# Patient Record
Sex: FEMALE | Race: WHITE | NOT HISPANIC OR LATINO | Employment: UNEMPLOYED | URBAN - NONMETROPOLITAN AREA
[De-identification: names, ages, dates, MRNs, and addresses within clinical notes are randomized per-mention and may not be internally consistent; named-entity substitution may affect disease eponyms.]

---

## 2017-01-06 ENCOUNTER — ANTICOAGULATION VISIT (OUTPATIENT)
Dept: CARDIOLOGY | Facility: CLINIC | Age: 37
End: 2017-01-06

## 2017-01-06 DIAGNOSIS — Z95.2 HX OF MITRAL VALVE REPLACEMENT WITH MECHANICAL VALVE: ICD-10-CM

## 2017-01-06 LAB — INR PPP: 3.4

## 2017-01-16 NOTE — PROGRESS NOTES
I have reviewed the notes, assessments, and/or procedures performed by nurse, I concur with her/his documentation of Frances Motta.

## 2017-01-20 ENCOUNTER — ANTICOAGULATION VISIT (OUTPATIENT)
Dept: CARDIOLOGY | Facility: CLINIC | Age: 37
End: 2017-01-20

## 2017-01-20 DIAGNOSIS — Z95.2 MECHANICAL HEART VALVE PRESENT: Primary | ICD-10-CM

## 2017-01-20 DIAGNOSIS — Z95.2 HX OF MITRAL VALVE REPLACEMENT WITH MECHANICAL VALVE: ICD-10-CM

## 2017-01-20 LAB — INR PPP: 2.6

## 2017-02-03 ENCOUNTER — ANTICOAGULATION VISIT (OUTPATIENT)
Dept: CARDIOLOGY | Facility: CLINIC | Age: 37
End: 2017-02-03

## 2017-02-03 ENCOUNTER — LAB (OUTPATIENT)
Dept: LAB | Facility: HOSPITAL | Age: 37
End: 2017-02-03

## 2017-02-03 DIAGNOSIS — Z95.2 HISTORY OF PROSTHETIC HEART VALVE: Primary | ICD-10-CM

## 2017-02-03 DIAGNOSIS — Z95.2 HX OF MITRAL VALVE REPLACEMENT WITH MECHANICAL VALVE: Primary | ICD-10-CM

## 2017-02-03 DIAGNOSIS — Z95.2 HX OF MITRAL VALVE REPLACEMENT WITH MECHANICAL VALVE: ICD-10-CM

## 2017-02-03 LAB
INR PPP: 2
INR PPP: 2 (ref 0.8–1.2)

## 2017-02-03 PROCEDURE — 85610 PROTHROMBIN TIME: CPT | Performed by: INTERNAL MEDICINE

## 2017-02-17 ENCOUNTER — RESULTS ENCOUNTER (OUTPATIENT)
Dept: CARDIOLOGY | Facility: CLINIC | Age: 37
End: 2017-02-17

## 2017-02-17 DIAGNOSIS — Z95.2 MECHANICAL HEART VALVE PRESENT: ICD-10-CM

## 2017-02-20 ENCOUNTER — APPOINTMENT (OUTPATIENT)
Dept: LAB | Facility: HOSPITAL | Age: 37
End: 2017-02-20

## 2017-02-20 ENCOUNTER — OFFICE VISIT (OUTPATIENT)
Dept: FAMILY MEDICINE CLINIC | Facility: CLINIC | Age: 37
End: 2017-02-20

## 2017-02-20 VITALS
WEIGHT: 217.7 LBS | DIASTOLIC BLOOD PRESSURE: 80 MMHG | SYSTOLIC BLOOD PRESSURE: 118 MMHG | OXYGEN SATURATION: 99 % | HEIGHT: 66 IN | BODY MASS INDEX: 34.99 KG/M2 | HEART RATE: 60 BPM | TEMPERATURE: 98.1 F

## 2017-02-20 DIAGNOSIS — E11.9 DIABETES MELLITUS WITHOUT COMPLICATION (HCC): Primary | ICD-10-CM

## 2017-02-20 DIAGNOSIS — E03.9 HYPOTHYROIDISM, UNSPECIFIED TYPE: ICD-10-CM

## 2017-02-20 LAB
ANION GAP SERPL CALCULATED.3IONS-SCNC: 10 MMOL/L (ref 5–15)
BUN BLD-MCNC: 20 MG/DL (ref 7–21)
BUN/CREAT SERPL: 24.4 (ref 7–25)
CALCIUM SPEC-SCNC: 9.3 MG/DL (ref 8.4–10.2)
CHLORIDE SERPL-SCNC: 101 MMOL/L (ref 95–110)
CO2 SERPL-SCNC: 28 MMOL/L (ref 22–31)
CREAT BLD-MCNC: 0.82 MG/DL (ref 0.5–1)
GFR SERPL CREATININE-BSD FRML MDRD: 78 ML/MIN/1.73 (ref 64–149)
GLUCOSE BLD-MCNC: 277 MG/DL (ref 60–100)
HBA1C MFR BLD: 8.25 % (ref 4–5.6)
INR PPP: 2.29 (ref 0.8–1.2)
POTASSIUM BLD-SCNC: 4.4 MMOL/L (ref 3.5–5.1)
PROTHROMBIN TIME: 24.6 SECONDS (ref 11.1–15.3)
SODIUM BLD-SCNC: 139 MMOL/L (ref 137–145)
TSH SERPL DL<=0.05 MIU/L-ACNC: 2.09 MIU/ML (ref 0.46–4.68)

## 2017-02-20 PROCEDURE — 36415 COLL VENOUS BLD VENIPUNCTURE: CPT

## 2017-02-20 PROCEDURE — 80048 BASIC METABOLIC PNL TOTAL CA: CPT | Performed by: FAMILY MEDICINE

## 2017-02-20 PROCEDURE — 84443 ASSAY THYROID STIM HORMONE: CPT | Performed by: FAMILY MEDICINE

## 2017-02-20 PROCEDURE — 99213 OFFICE O/P EST LOW 20 MIN: CPT | Performed by: FAMILY MEDICINE

## 2017-02-20 PROCEDURE — 82043 UR ALBUMIN QUANTITATIVE: CPT | Performed by: FAMILY MEDICINE

## 2017-02-20 PROCEDURE — 83036 HEMOGLOBIN GLYCOSYLATED A1C: CPT | Performed by: FAMILY MEDICINE

## 2017-02-20 PROCEDURE — 85610 PROTHROMBIN TIME: CPT | Performed by: INTERNAL MEDICINE

## 2017-02-20 RX ORDER — FUROSEMIDE 40 MG/1
40 TABLET ORAL DAILY
Qty: 30 TABLET | Refills: 5 | Status: SHIPPED | OUTPATIENT
Start: 2017-02-20 | End: 2017-05-19 | Stop reason: SDUPTHER

## 2017-02-20 NOTE — PROGRESS NOTES
Subjective   Chief Complaint   Patient presents with   • Follow-up   • Diabetes       Frances Motta is a 36 y.o. female who presents for Follow-up and Diabetes   Diabetes   She presents for her follow-up diabetic visit. She has type 2 diabetes mellitus. There are no hypoglycemic associated symptoms. Pertinent negatives for hypoglycemia include no headaches. Pertinent negatives for diabetes include no chest pain, no fatigue, no foot paresthesias, no foot ulcerations, no polydipsia, no polyphagia, no polyuria and no visual change. Current diabetic treatment includes oral agent (dual therapy). She is compliant with treatment all of the time. (FBS ) Eye exam is current.   no episodes of hypoglycemia. Did have an episode where she felt sweaty, but not sure what her sugar was. Does not happen often.    The following portions of the patient's history were reviewed and updated as appropriate:problem list, current medications, allergies, past family history, past medical history, past social history and past surgical history    Past Medical History   Diagnosis Date   • Congestive heart failure    • Diabetes mellitus    • Enlarged heart        Social History   Substance Use Topics   • Smoking status: Former Smoker   • Smokeless tobacco: Not on file   • Alcohol use No       Medications:    Current Outpatient Prescriptions:   •  atorvastatin (LIPITOR) 20 MG tablet, Take 20 mg by mouth Daily., Disp: , Rfl:   •  fenofibrate 160 MG tablet, Take 160 mg by mouth Daily., Disp: , Rfl:   •  furosemide (LASIX) 40 MG tablet, Take 1 tablet by mouth Daily., Disp: 30 tablet, Rfl: 6  •  glipiZIDE (GLUCOTROL) 5 MG tablet, Take 1 tablet by mouth 2 (Two) Times a Day Before Meals., Disp: 60 tablet, Rfl: 5  •  levothyroxine (SYNTHROID, LEVOTHROID) 50 MCG tablet, Take 1 tab po Tuesday, Thursday, Saturday, Sunday and 1/2 tab po Monday, Wednesday, Friday, Disp: 28 tablet, Rfl: 5  •  metFORMIN (GLUCOPHAGE) 1000 MG tablet, Take 1 tablet  "by mouth 2 (Two) Times a Day., Disp: 60 tablet, Rfl: 6  •  pantoprazole (PROTONIX) 40 MG EC tablet, Take 1 tablet by mouth Daily., Disp: 30 tablet, Rfl: 6  •  sotalol (BETAPACE) 80 MG tablet, TAKE ONE TABLET BY MOUTH TWICE A DAY, Disp: 60 tablet, Rfl: 5  •  warfarin (COUMADIN) 5 MG tablet, Take 1 tablet by mouth Dose Adjusted By Provider As Needed., Disp: 90 tablet, Rfl: 6    Allergies   Allergen Reactions   • Lortab [Hydrocodone-Acetaminophen]        Review of Systems   Constitutional: Negative for fatigue and fever.   Respiratory: Negative for cough and shortness of breath.    Cardiovascular: Negative for chest pain and leg swelling.   Endocrine: Negative for polydipsia, polyphagia and polyuria.   Neurological: Negative for headaches.       Objective   Visit Vitals   • /80   • Pulse 60   • Temp 98.1 °F (36.7 °C)   • Ht 66\" (167.6 cm)   • Wt 217 lb 11.2 oz (98.7 kg)   • SpO2 99%   • BMI 35.14 kg/m2       Physical Exam   Constitutional: She appears well-developed and well-nourished. No distress.   HENT:   Head: Normocephalic.   Eyes: Conjunctivae are normal.   Cardiovascular: Normal rate, regular rhythm and normal heart sounds.  Exam reveals no gallop and no friction rub.    No murmur heard.  Pulmonary/Chest: Effort normal and breath sounds normal.   Abdominal: She exhibits no distension.   Musculoskeletal: She exhibits no edema.    Frances had a diabetic foot exam performed today.   During the foot exam she had a monofilament test performed (normal sensation bilaterally).    Vascular Status -  Her exam exhibits right foot vasculature normal. Her exam exhibits left foot vasculature normal.   Skin Integrity  -  Her right foot skin is intact.     Frances 's left foot skin is intact. .  Neurological: She is alert.   Skin: Skin is warm and dry.   Psychiatric: She has a normal mood and affect. Her behavior is normal.   Nursing note and vitals reviewed.      Assessment/Plan   Frances Motta is a 36 y.o. female " seen today for the followin. Diabetes mellitus without complication  Add januvia    - Hemoglobin A1c  - Basic Metabolic Panel  - SITagliptin (JANUVIA) 100 MG tablet; Take 1 tablet by mouth Daily.  Dispense: 14 tablet; Refill: 0    2. Hypothyroidism, unspecified type    - TSH    Follow up: Return in about 3 months (around 2017) for Follow up Diabetes.          This document has been electronically signed by Casandra Pedersen DO on 2017 10:22 PM

## 2017-03-03 ENCOUNTER — APPOINTMENT (OUTPATIENT)
Dept: LAB | Facility: HOSPITAL | Age: 37
End: 2017-03-03

## 2017-03-03 ENCOUNTER — ANTICOAGULATION VISIT (OUTPATIENT)
Dept: CARDIOLOGY | Facility: CLINIC | Age: 37
End: 2017-03-03

## 2017-03-03 DIAGNOSIS — Z95.2 S/P MITRAL VALVE REPLACEMENT: Primary | ICD-10-CM

## 2017-03-03 DIAGNOSIS — Z95.2 HX OF MITRAL VALVE REPLACEMENT WITH MECHANICAL VALVE: ICD-10-CM

## 2017-03-03 LAB
INR PPP: 3
INR PPP: 3 (ref 0.8–1.2)

## 2017-03-03 PROCEDURE — 85610 PROTHROMBIN TIME: CPT | Performed by: INTERNAL MEDICINE

## 2017-03-07 ENCOUNTER — TELEPHONE (OUTPATIENT)
Dept: FAMILY MEDICINE CLINIC | Facility: CLINIC | Age: 37
End: 2017-03-07

## 2017-03-07 DIAGNOSIS — E11.9 DIABETES MELLITUS WITHOUT COMPLICATION (HCC): ICD-10-CM

## 2017-03-07 NOTE — TELEPHONE ENCOUNTER
----- Message from Corrie Olivas sent at 3/6/2017  1:47 PM CST -----  Regarding: Refill  Contact: [phone]  Pt needs a refill on januvia.  Dr. Pedersen gave her samples and now she wants a RX for it sent to [facility].

## 2017-03-07 NOTE — TELEPHONE ENCOUNTER
----- Message from Corrie Olivas sent at 3/6/2017  1:47 PM CST -----  Regarding: Refill  Contact: [phone]  Pt needs a refill on januvia.  Dr. Pedersen gave her samples and now she wants a RX for it sent to [facility].

## 2017-03-17 ENCOUNTER — RESULTS ENCOUNTER (OUTPATIENT)
Dept: CARDIOLOGY | Facility: CLINIC | Age: 37
End: 2017-03-17

## 2017-03-17 DIAGNOSIS — Z95.2 MECHANICAL HEART VALVE PRESENT: ICD-10-CM

## 2017-04-03 ENCOUNTER — ANTICOAGULATION VISIT (OUTPATIENT)
Dept: CARDIOLOGY | Facility: CLINIC | Age: 37
End: 2017-04-03

## 2017-04-03 DIAGNOSIS — Z95.2 HX OF MITRAL VALVE REPLACEMENT WITH MECHANICAL VALVE: ICD-10-CM

## 2017-04-03 LAB
INR PPP: 1.8
INR PPP: 1.8

## 2017-04-05 ENCOUNTER — CLINICAL SUPPORT (OUTPATIENT)
Dept: CARDIOLOGY | Facility: CLINIC | Age: 37
End: 2017-04-05

## 2017-04-05 DIAGNOSIS — I42.1 HYPERTROPHIC OBSTRUCTIVE CARDIOMYOPATHY (HOCM) (HCC): Primary | ICD-10-CM

## 2017-04-05 PROCEDURE — 93289 INTERROG DEVICE EVAL HEART: CPT | Performed by: INTERNAL MEDICINE

## 2017-04-06 NOTE — PROGRESS NOTES
ICD interrogation report on Frances Motta is a 36 yr lady with IHSS and ventricular arrythmias s/p ICD placement on 7/13/2015. Device is a CHOBOL[employer] Evera XT DR    Battery voltage was adequate and longevity 9.3 yrs.    Bradycardia parameters were programmed in AAI / DDD mode. Tachycardia parameters were programmed in two zones VT-1 at 167 bpm, and VF at 188 bpm.    Atrial sensing was 1.9 mV and threshold 0.5 V @ 0.4 ms and impedence 513 ohms.  Ventricular sensing was 5.6 mV and threshold was 0.625 V @ 0.4 ms and impedence 342 ohms.    No atrial episodes were noted and 21 monitored VT episodes were noted, the longest lasting 2 sec.    ICD functioning well and no changes in parameters made.

## 2017-04-14 ENCOUNTER — HOSPITAL ENCOUNTER (EMERGENCY)
Facility: HOSPITAL | Age: 37
Discharge: HOME | End: 2017-04-14
Attending: FAMILY MEDICINE | Admitting: FAMILY MEDICINE

## 2017-04-14 ENCOUNTER — ANTICOAGULATION VISIT (OUTPATIENT)
Dept: CARDIOLOGY | Facility: CLINIC | Age: 37
End: 2017-04-14

## 2017-04-14 ENCOUNTER — RESULTS ENCOUNTER (OUTPATIENT)
Dept: CARDIOLOGY | Facility: CLINIC | Age: 37
End: 2017-04-14

## 2017-04-14 ENCOUNTER — APPOINTMENT (OUTPATIENT)
Dept: GENERAL RADIOLOGY | Facility: HOSPITAL | Age: 37
End: 2017-04-14

## 2017-04-14 VITALS
BODY MASS INDEX: 34.87 KG/M2 | RESPIRATION RATE: 20 BRPM | TEMPERATURE: 97.9 F | DIASTOLIC BLOOD PRESSURE: 59 MMHG | SYSTOLIC BLOOD PRESSURE: 102 MMHG | HEART RATE: 59 BPM | WEIGHT: 217 LBS | OXYGEN SATURATION: 98 % | HEIGHT: 66 IN

## 2017-04-14 DIAGNOSIS — Z95.2 MECHANICAL HEART VALVE PRESENT: ICD-10-CM

## 2017-04-14 DIAGNOSIS — R07.89 CHEST WALL PAIN: Primary | ICD-10-CM

## 2017-04-14 DIAGNOSIS — Z95.2 HX OF MITRAL VALVE REPLACEMENT WITH MECHANICAL VALVE: ICD-10-CM

## 2017-04-14 LAB
ALBUMIN SERPL-MCNC: 4 G/DL (ref 3.4–4.8)
ALBUMIN/GLOB SERPL: 1.4 G/DL (ref 1.1–1.8)
ALP SERPL-CCNC: 49 U/L (ref 38–126)
ALT SERPL W P-5'-P-CCNC: 34 U/L (ref 9–52)
ANION GAP SERPL CALCULATED.3IONS-SCNC: 12 MMOL/L (ref 5–15)
AST SERPL-CCNC: 31 U/L (ref 14–36)
BASOPHILS # BLD AUTO: 0.02 10*3/MM3 (ref 0–0.2)
BASOPHILS NFR BLD AUTO: 0.3 % (ref 0–2)
BILIRUB SERPL-MCNC: 0.4 MG/DL (ref 0.2–1.3)
BUN BLD-MCNC: 15 MG/DL (ref 7–21)
BUN/CREAT SERPL: 18.1 (ref 7–25)
CALCIUM SPEC-SCNC: 8.9 MG/DL (ref 8.4–10.2)
CHLORIDE SERPL-SCNC: 103 MMOL/L (ref 95–110)
CK MB SERPL-CCNC: 2.84 NG/ML (ref 0–5)
CK SERPL-CCNC: 91 U/L (ref 30–135)
CO2 SERPL-SCNC: 26 MMOL/L (ref 22–31)
CREAT BLD-MCNC: 0.83 MG/DL (ref 0.5–1)
DEPRECATED RDW RBC AUTO: 40.9 FL (ref 36.4–46.3)
EOSINOPHIL # BLD AUTO: 0.02 10*3/MM3 (ref 0–0.7)
EOSINOPHIL NFR BLD AUTO: 0.3 % (ref 0–7)
ERYTHROCYTE [DISTWIDTH] IN BLOOD BY AUTOMATED COUNT: 13.5 % (ref 11.5–14.5)
GFR SERPL CREATININE-BSD FRML MDRD: 77 ML/MIN/1.73 (ref 60–149)
GLOBULIN UR ELPH-MCNC: 2.8 GM/DL (ref 2.3–3.5)
GLUCOSE BLD-MCNC: 173 MG/DL (ref 60–100)
HCT VFR BLD AUTO: 36 % (ref 35–45)
HGB BLD-MCNC: 11.9 G/DL (ref 12–15.5)
HOLD SPECIMEN: NORMAL
HOLD SPECIMEN: NORMAL
IMM GRANULOCYTES # BLD: 0.02 10*3/MM3 (ref 0–0.02)
IMM GRANULOCYTES NFR BLD: 0.3 % (ref 0–0.5)
INR PPP: 1.8
INR PPP: 1.88 (ref 0.8–1.2)
LIPASE SERPL-CCNC: 80 U/L (ref 23–300)
LYMPHOCYTES # BLD AUTO: 2.87 10*3/MM3 (ref 0.6–4.2)
LYMPHOCYTES NFR BLD AUTO: 42.1 % (ref 10–50)
MAGNESIUM SERPL-MCNC: 1.8 MG/DL (ref 1.6–2.3)
MCH RBC QN AUTO: 27 PG (ref 26.5–34)
MCHC RBC AUTO-ENTMCNC: 33.1 G/DL (ref 31.4–36)
MCV RBC AUTO: 81.8 FL (ref 80–98)
MONOCYTES # BLD AUTO: 0.55 10*3/MM3 (ref 0–0.9)
MONOCYTES NFR BLD AUTO: 8.1 % (ref 0–12)
NEUTROPHILS # BLD AUTO: 3.34 10*3/MM3 (ref 2–8.6)
NEUTROPHILS NFR BLD AUTO: 48.9 % (ref 37–80)
NT-PROBNP SERPL-MCNC: 756 PG/ML (ref 0–450)
PLATELET # BLD AUTO: 367 10*3/MM3 (ref 150–450)
PMV BLD AUTO: 8.7 FL (ref 8–12)
POTASSIUM BLD-SCNC: 3.8 MMOL/L (ref 3.5–5.1)
PROT SERPL-MCNC: 6.8 G/DL (ref 6.3–8.6)
PROTHROMBIN TIME: 21.7 SECONDS (ref 11.1–15.3)
RBC # BLD AUTO: 4.4 10*6/MM3 (ref 3.77–5.16)
SODIUM BLD-SCNC: 141 MMOL/L (ref 137–145)
TROPONIN I SERPL-MCNC: 0.02 NG/ML
TSH SERPL DL<=0.05 MIU/L-ACNC: 6.97 MIU/ML (ref 0.46–4.68)
WBC NRBC COR # BLD: 6.82 10*3/MM3 (ref 3.2–9.8)
WHOLE BLOOD HOLD SPECIMEN: NORMAL
WHOLE BLOOD HOLD SPECIMEN: NORMAL

## 2017-04-14 PROCEDURE — 96374 THER/PROPH/DIAG INJ IV PUSH: CPT

## 2017-04-14 PROCEDURE — 80053 COMPREHEN METABOLIC PANEL: CPT | Performed by: FAMILY MEDICINE

## 2017-04-14 PROCEDURE — 25010000002 MORPHINE PER 10 MG: Performed by: FAMILY MEDICINE

## 2017-04-14 PROCEDURE — 84484 ASSAY OF TROPONIN QUANT: CPT | Performed by: FAMILY MEDICINE

## 2017-04-14 PROCEDURE — 71010 HC CHEST PA OR AP: CPT

## 2017-04-14 PROCEDURE — 83735 ASSAY OF MAGNESIUM: CPT | Performed by: EMERGENCY MEDICINE

## 2017-04-14 PROCEDURE — 83880 ASSAY OF NATRIURETIC PEPTIDE: CPT | Performed by: FAMILY MEDICINE

## 2017-04-14 PROCEDURE — 85025 COMPLETE CBC W/AUTO DIFF WBC: CPT | Performed by: FAMILY MEDICINE

## 2017-04-14 PROCEDURE — 84443 ASSAY THYROID STIM HORMONE: CPT | Performed by: EMERGENCY MEDICINE

## 2017-04-14 PROCEDURE — 82553 CREATINE MB FRACTION: CPT | Performed by: EMERGENCY MEDICINE

## 2017-04-14 PROCEDURE — 82550 ASSAY OF CK (CPK): CPT | Performed by: EMERGENCY MEDICINE

## 2017-04-14 PROCEDURE — 99284 EMERGENCY DEPT VISIT MOD MDM: CPT

## 2017-04-14 PROCEDURE — 93005 ELECTROCARDIOGRAM TRACING: CPT | Performed by: EMERGENCY MEDICINE

## 2017-04-14 PROCEDURE — 85610 PROTHROMBIN TIME: CPT | Performed by: EMERGENCY MEDICINE

## 2017-04-14 PROCEDURE — 83690 ASSAY OF LIPASE: CPT | Performed by: EMERGENCY MEDICINE

## 2017-04-14 RX ORDER — OXYCODONE HYDROCHLORIDE AND ACETAMINOPHEN 5; 325 MG/1; MG/1
1 TABLET ORAL EVERY 6 HOURS PRN
Qty: 15 TABLET | Refills: 0 | Status: SHIPPED | OUTPATIENT
Start: 2017-04-14 | End: 2017-05-19

## 2017-04-14 RX ORDER — SODIUM CHLORIDE 0.9 % (FLUSH) 0.9 %
10 SYRINGE (ML) INJECTION AS NEEDED
Status: DISCONTINUED | OUTPATIENT
Start: 2017-04-14 | End: 2017-04-14 | Stop reason: HOSPADM

## 2017-04-14 RX ORDER — ASPIRIN 325 MG
325 TABLET ORAL ONCE
Status: COMPLETED | OUTPATIENT
Start: 2017-04-14 | End: 2017-04-14

## 2017-04-14 RX ORDER — NITROGLYCERIN 0.4 MG/1
0.4 TABLET SUBLINGUAL
Status: DISCONTINUED | OUTPATIENT
Start: 2017-04-14 | End: 2017-04-14 | Stop reason: HOSPADM

## 2017-04-14 RX ORDER — MORPHINE SULFATE 4 MG/ML
4 INJECTION, SOLUTION INTRAMUSCULAR; INTRAVENOUS ONCE
Status: COMPLETED | OUTPATIENT
Start: 2017-04-14 | End: 2017-04-14

## 2017-04-14 RX ADMIN — MORPHINE SULFATE 4 MG: 4 INJECTION, SOLUTION INTRAMUSCULAR; INTRAVENOUS at 09:30

## 2017-04-14 RX ADMIN — NITROGLYCERIN 0.4 MG: 0.4 TABLET SUBLINGUAL at 07:01

## 2017-04-14 RX ADMIN — ASPIRIN 325 MG: 325 TABLET, COATED ORAL at 06:50

## 2017-04-14 RX ADMIN — NITROGLYCERIN 1 INCH: 20 OINTMENT TOPICAL at 06:50

## 2017-04-14 RX ADMIN — NITROGLYCERIN 0.4 MG: 0.4 TABLET SUBLINGUAL at 07:09

## 2017-04-14 NOTE — ED PROVIDER NOTES
Subjective   HPI Comments: Mechanical valve surgery done in 2015. Pacer/defib placed in 2015.    Patient is a 36 y.o. female presenting with chest pain.   Chest Pain   Pain location:  L chest and substernal area  Pain quality: pressure    Pain radiates to:  L arm  Pain severity:  Moderate  Onset quality:  Sudden  Duration:  2 hours  Timing:  Constant  Progression:  Unchanged  Chronicity:  New  Relieved by:  Nothing  Worsened by:  Nothing  Ineffective treatments:  None tried  Associated symptoms: dizziness and shortness of breath    Associated symptoms: no abdominal pain, no anorexia, no anxiety, no cough, no diaphoresis, no dysphagia, no fatigue, no fever, no headache, no heartburn, no nausea, no near-syncope, no numbness, no orthopnea, no palpitations, no PND, no syncope, no vomiting and no weakness    Risk factors: diabetes mellitus, high cholesterol and obesity    Risk factors: no coronary artery disease, no hypertension and not male  Smoker: quit 4 years ago.        Review of Systems   Constitutional: Negative for appetite change, chills, diaphoresis, fatigue and fever.   HENT: Negative for congestion, ear discharge, ear pain, nosebleeds, rhinorrhea, sinus pressure, sore throat and trouble swallowing.    Eyes: Negative for discharge and redness.   Respiratory: Positive for shortness of breath. Negative for apnea, cough, chest tightness and wheezing.    Cardiovascular: Positive for chest pain. Negative for palpitations, orthopnea, syncope, PND and near-syncope.   Gastrointestinal: Negative for abdominal pain, anorexia, diarrhea, heartburn, nausea and vomiting.   Endocrine: Negative for polyuria.   Genitourinary: Negative for dysuria, frequency and urgency.   Musculoskeletal: Negative for myalgias and neck pain.   Skin: Negative for color change and rash.   Allergic/Immunologic: Negative for immunocompromised state.   Neurological: Positive for dizziness. Negative for seizures, syncope, weakness, light-headedness,  numbness and headaches.   Hematological: Negative for adenopathy. Does not bruise/bleed easily.   Psychiatric/Behavioral: Negative for behavioral problems and confusion.   All other systems reviewed and are negative.      Past Medical History:   Diagnosis Date   • Asthma    • Congestive heart failure    • Diabetes mellitus    • Disease of thyroid gland     hypothyroidism    • Enlarged heart    • Migraine        Allergies   Allergen Reactions   • Lortab [Hydrocodone-Acetaminophen]        Past Surgical History:   Procedure Laterality Date   • A-V CARDIAC PACEMAKER INSERTION     • APPENDECTOMY     • ATRIAL CARDIAC PACEMAKER INSERTION     • CARDIAC CATHETERIZATION     • CARDIAC SURGERY     • CARDIAC VALVE SURGERY     • CYSTOSCOPY  09/23/2015   • HYSTERECTOMY     • TONSILLECTOMY     • TRANSESOPHAGEAL ECHOCARDIOGRAM (CABRERA)         Family History   Problem Relation Age of Onset   • Diabetes Father    • Hypertension Father    • Diabetes Maternal Grandmother    • Kidney disease Maternal Grandmother    • Hypertension Maternal Grandmother    • Heart disease Maternal Grandmother    • Stroke Maternal Grandmother    • Thyroid disease Maternal Grandmother    • Cancer Maternal Grandfather    • Kidney disease Paternal Grandmother    • Diabetes Paternal Grandfather        Social History     Social History   • Marital status:      Spouse name: N/A   • Number of children: N/A   • Years of education: N/A     Social History Main Topics   • Smoking status: Former Smoker   • Smokeless tobacco: None   • Alcohol use No   • Drug use: No   • Sexual activity: Not Asked     Other Topics Concern   • None     Social History Narrative   • None           Objective   Physical Exam   Constitutional: She is oriented to person, place, and time. She appears well-developed and well-nourished.   HENT:   Head: Normocephalic and atraumatic.   Nose: Nose normal.   Mouth/Throat: Oropharynx is clear and moist.   Eyes: Conjunctivae and EOM are normal.  Pupils are equal, round, and reactive to light. Right eye exhibits no discharge. Left eye exhibits no discharge. No scleral icterus.   Neck: Normal range of motion. Neck supple. No tracheal deviation present.   Cardiovascular: Normal rate, regular rhythm and normal heart sounds.    No murmur heard.  Pulmonary/Chest: Effort normal and breath sounds normal. No stridor. No respiratory distress. She has no wheezes. She has no rales.   Abdominal: Soft. Bowel sounds are normal. She exhibits no distension and no mass. There is no tenderness. There is no rebound and no guarding.   Musculoskeletal: She exhibits no edema.   Neurological: She is alert and oriented to person, place, and time. Coordination normal.   Skin: Skin is warm and dry. No rash noted. No erythema.   Psychiatric: She has a normal mood and affect. Her behavior is normal. Thought content normal.   Nursing note and vitals reviewed.      ECG 12 Lead    Date/Time: 4/14/2017 9:39 AM  Performed by: ANDRY HOLT  Authorized by: SANTHOSH LAZAR   Interpreted by physician  Comparison: compared with previous ECG from 10/31/2016  Similar to previous ECG  Comparison to previous ECG:     Rhythm: sinus rhythm  Rate: normal  BPM: 61  QRS axis: normal  Conduction: 1st degree  ST Elevation: V1  ST Depression: I                 ED Course  ED Course   Comment By Time   Cardiology called to evaluate patient Santhosh Lazar MD 04/14 0301   Discussed with Dr. Handley he said he is in the hospital and he will come to see the patient Santhosh Lazar MD 04/14 0162   Findings discussed with Dr. Haro, who states patient does not need admission for chest pain.  Patient discharged home, as chest pain was her main complaint and reason for admission. Andry Holt MD 04/14 7591   INR values discussed with patient, and patient shown her most recent past values as well.  Advised follow-up with the Coumadin clinic today for further instruction and management of her  Coumadin. Polo Holt MD 04/14 0951   Request # : [id number] Polo Holt MD 04/14 0943            Labs Reviewed   COMPREHENSIVE METABOLIC PANEL - Abnormal; Notable for the following:        Result Value    Glucose 173 (*)     All other components within normal limits   BNP (IN-HOUSE) - Abnormal; Notable for the following:     proBNP 756.0 (*)     All other components within normal limits   CBC WITH AUTO DIFFERENTIAL - Abnormal; Notable for the following:     Hemoglobin 11.9 (*)     All other components within normal limits   PROTIME-INR - Abnormal; Notable for the following:     Protime 21.7 (*)     INR 1.88 (*)     All other components within normal limits    Narrative:     Therapeutic range for most indications is 2.0-3.0 INR,  or 2.5-3.5 for mechanical heart valves.   TSH - Abnormal; Notable for the following:     TSH 6.970 (*)     All other components within normal limits   TROPONIN (IN-HOUSE) - Normal   CK - Normal   CK MB - Normal   LIPASE - Normal   MAGNESIUM - Normal   RAINBOW DRAW    Narrative:     The following orders were created for panel order [place] Draw.  Procedure                               Abnormality         Status                     ---------                               -----------         ------                     Light Blue Top[[id number]]                                                               Green Top (Gel)[[id number]]                                   In process                 Lavender Top[[id number]]                                      In process                 Gold Top - SST[[id number]]                                    In process                   Please view results for these tests on the individual orders.   TROPONIN (IN-HOUSE)   PROTIME-INR   TROPONIN (IN-HOUSE)   TROPONIN (IN-HOUSE)   TROPONIN (IN-HOUSE)   CK   CK   CK MB   CK MB   BNP (IN-HOUSE)   RAINBOW DRAW    Narrative:     The following orders were created for panel order [place] Draw.  Procedure                                Abnormality         Status                     ---------                               -----------         ------                     Light Blue Top[[id number]]                                    In process                 Green Top (Gel)[[id number]]                                                              Lavender Top[[id number]]                                                                 Gold Top - SST[[id number]]                                                                 Please view results for these tests on the individual orders.   COMPREHENSIVE METABOLIC PANEL   CBC WITH AUTO DIFFERENTIAL   POCT PROTIME - INR   CBC AND DIFFERENTIAL    Narrative:     The following orders were created for panel order CBC & Differential.  Procedure                               Abnormality         Status                     ---------                               -----------         ------                     CBC Auto Differential[[id number]]         Abnormal            Final result                 Please view results for these tests on the individual orders.   LIGHT BLUE TOP   GREEN TOP   LAVENDER TOP   GOLD TOP - SST   CBC AND DIFFERENTIAL    Narrative:     The following orders were created for panel order CBC & Differential.  Procedure                               Abnormality         Status                     ---------                               -----------         ------                     CBC Auto Differential[[id number]]                                                          Please view results for these tests on the individual orders.   LIGHT BLUE TOP   GREEN TOP   LAVENDER TOP   GOLD TOP - SST       XR Chest 1 View   Final Result   CONCLUSION: No active disease.      Electronically signed by:  Con Cervantes  4/14/2017 7:00 AM   CDT Workstation: [id number]                  [facility]    Final diagnoses:   Chest wall pain            Polo Holt MD  04/14/17 0938       Polo CONN  MD Jonathon  04/14/17 0941

## 2017-04-18 ENCOUNTER — HOSPITAL ENCOUNTER (EMERGENCY)
Facility: HOSPITAL | Age: 37
Discharge: HOME | End: 2017-04-18
Attending: FAMILY MEDICINE | Admitting: FAMILY MEDICINE

## 2017-04-18 ENCOUNTER — APPOINTMENT (OUTPATIENT)
Dept: GENERAL RADIOLOGY | Facility: HOSPITAL | Age: 37
End: 2017-04-18

## 2017-04-18 VITALS
TEMPERATURE: 98.3 F | HEART RATE: 58 BPM | BODY MASS INDEX: 34.87 KG/M2 | OXYGEN SATURATION: 97 % | HEIGHT: 66 IN | SYSTOLIC BLOOD PRESSURE: 113 MMHG | WEIGHT: 217 LBS | DIASTOLIC BLOOD PRESSURE: 61 MMHG | RESPIRATION RATE: 20 BRPM

## 2017-04-18 DIAGNOSIS — R07.9 CHEST PAIN, UNSPECIFIED TYPE: Primary | ICD-10-CM

## 2017-04-18 LAB
ALBUMIN SERPL-MCNC: 4.1 G/DL (ref 3.4–4.8)
ALBUMIN/GLOB SERPL: 1.4 G/DL (ref 1.1–1.8)
ALP SERPL-CCNC: 50 U/L (ref 38–126)
ALT SERPL W P-5'-P-CCNC: 39 U/L (ref 9–52)
ANION GAP SERPL CALCULATED.3IONS-SCNC: 13 MMOL/L (ref 5–15)
APTT PPP: 41.7 SECONDS (ref 20–40.3)
AST SERPL-CCNC: 52 U/L (ref 14–36)
BASOPHILS # BLD AUTO: 0.02 10*3/MM3 (ref 0–0.2)
BASOPHILS NFR BLD AUTO: 0.2 % (ref 0–2)
BILIRUB SERPL-MCNC: 0.4 MG/DL (ref 0.2–1.3)
BUN BLD-MCNC: 15 MG/DL (ref 7–21)
BUN/CREAT SERPL: 19.7 (ref 7–25)
CALCIUM SPEC-SCNC: 9.4 MG/DL (ref 8.4–10.2)
CHLORIDE SERPL-SCNC: 101 MMOL/L (ref 95–110)
CO2 SERPL-SCNC: 25 MMOL/L (ref 22–31)
CREAT BLD-MCNC: 0.76 MG/DL (ref 0.5–1)
DEPRECATED RDW RBC AUTO: 39.5 FL (ref 36.4–46.3)
EOSINOPHIL # BLD AUTO: 0.02 10*3/MM3 (ref 0–0.7)
EOSINOPHIL NFR BLD AUTO: 0.2 % (ref 0–7)
ERYTHROCYTE [DISTWIDTH] IN BLOOD BY AUTOMATED COUNT: 13.4 % (ref 11.5–14.5)
GFR SERPL CREATININE-BSD FRML MDRD: 85 ML/MIN/1.73 (ref 60–149)
GLOBULIN UR ELPH-MCNC: 2.9 GM/DL (ref 2.3–3.5)
GLUCOSE BLD-MCNC: 124 MG/DL (ref 60–100)
HCT VFR BLD AUTO: 37.7 % (ref 35–45)
HGB BLD-MCNC: 12.7 G/DL (ref 12–15.5)
HOLD SPECIMEN: NORMAL
HOLD SPECIMEN: NORMAL
IMM GRANULOCYTES # BLD: 0.02 10*3/MM3 (ref 0–0.02)
IMM GRANULOCYTES NFR BLD: 0.2 % (ref 0–0.5)
INR PPP: 3.33 (ref 0.8–1.2)
LYMPHOCYTES # BLD AUTO: 1.96 10*3/MM3 (ref 0.6–4.2)
LYMPHOCYTES NFR BLD AUTO: 18.6 % (ref 10–50)
MCH RBC QN AUTO: 27.2 PG (ref 26.5–34)
MCHC RBC AUTO-ENTMCNC: 33.7 G/DL (ref 31.4–36)
MCV RBC AUTO: 80.7 FL (ref 80–98)
MONOCYTES # BLD AUTO: 0.64 10*3/MM3 (ref 0–0.9)
MONOCYTES NFR BLD AUTO: 6.1 % (ref 0–12)
NEUTROPHILS # BLD AUTO: 7.86 10*3/MM3 (ref 2–8.6)
NEUTROPHILS NFR BLD AUTO: 74.7 % (ref 37–80)
NT-PROBNP SERPL-MCNC: 889 PG/ML (ref 0–450)
PLATELET # BLD AUTO: 367 10*3/MM3 (ref 150–450)
PMV BLD AUTO: 8.7 FL (ref 8–12)
POTASSIUM BLD-SCNC: 4.1 MMOL/L (ref 3.5–5.1)
PROT SERPL-MCNC: 7 G/DL (ref 6.3–8.6)
PROTHROMBIN TIME: 34.3 SECONDS (ref 11.1–15.3)
RBC # BLD AUTO: 4.67 10*6/MM3 (ref 3.77–5.16)
SODIUM BLD-SCNC: 139 MMOL/L (ref 137–145)
TROPONIN I SERPL-MCNC: <0.012 NG/ML
WBC NRBC COR # BLD: 10.52 10*3/MM3 (ref 3.2–9.8)
WHOLE BLOOD HOLD SPECIMEN: NORMAL
WHOLE BLOOD HOLD SPECIMEN: NORMAL

## 2017-04-18 PROCEDURE — 36415 COLL VENOUS BLD VENIPUNCTURE: CPT

## 2017-04-18 PROCEDURE — 99284 EMERGENCY DEPT VISIT MOD MDM: CPT

## 2017-04-18 PROCEDURE — 83880 ASSAY OF NATRIURETIC PEPTIDE: CPT | Performed by: FAMILY MEDICINE

## 2017-04-18 PROCEDURE — 93010 ELECTROCARDIOGRAM REPORT: CPT | Performed by: INTERNAL MEDICINE

## 2017-04-18 PROCEDURE — 93005 ELECTROCARDIOGRAM TRACING: CPT | Performed by: FAMILY MEDICINE

## 2017-04-18 PROCEDURE — 85610 PROTHROMBIN TIME: CPT | Performed by: FAMILY MEDICINE

## 2017-04-18 PROCEDURE — 80053 COMPREHEN METABOLIC PANEL: CPT | Performed by: FAMILY MEDICINE

## 2017-04-18 PROCEDURE — 71020 HC CHEST PA AND LATERAL: CPT

## 2017-04-18 PROCEDURE — 85730 THROMBOPLASTIN TIME PARTIAL: CPT | Performed by: FAMILY MEDICINE

## 2017-04-18 PROCEDURE — 84484 ASSAY OF TROPONIN QUANT: CPT | Performed by: FAMILY MEDICINE

## 2017-04-18 PROCEDURE — 85025 COMPLETE CBC W/AUTO DIFF WBC: CPT | Performed by: FAMILY MEDICINE

## 2017-04-18 PROCEDURE — 93005 ELECTROCARDIOGRAM TRACING: CPT

## 2017-04-18 RX ORDER — NITROGLYCERIN 0.4 MG/1
0.4 TABLET SUBLINGUAL
Status: DISCONTINUED | OUTPATIENT
Start: 2017-04-18 | End: 2017-04-18 | Stop reason: HOSPADM

## 2017-04-18 RX ORDER — ASPIRIN 325 MG
325 TABLET ORAL ONCE
Status: COMPLETED | OUTPATIENT
Start: 2017-04-18 | End: 2017-04-18

## 2017-04-18 RX ORDER — SODIUM CHLORIDE 0.9 % (FLUSH) 0.9 %
10 SYRINGE (ML) INJECTION AS NEEDED
Status: DISCONTINUED | OUTPATIENT
Start: 2017-04-18 | End: 2017-04-18 | Stop reason: HOSPADM

## 2017-04-18 RX ADMIN — ASPIRIN 325 MG: 325 TABLET, COATED ORAL at 16:00

## 2017-04-18 RX ADMIN — NITROGLYCERIN 0.4 MG: 0.4 TABLET SUBLINGUAL at 17:11

## 2017-04-18 NOTE — ED PROVIDER NOTES
Subjective   Patient is a 36 y.o. female presenting with chest pain.   History provided by:  Patient  Chest Pain   Pain location:  Substernal area  Pain quality: crushing    Pain radiates to:  Mid back  Pain severity:  Severe  Onset quality:  Sudden  Duration:  3 hours  Timing:  Constant  Progression:  Unchanged  Chronicity:  Recurrent  Context: not at rest, not stress and not trauma    Relieved by:  Certain positions  Worsened by:  Deep breathing  Ineffective treatments:  None tried  Associated symptoms: abdominal pain (over umbilical hernia), back pain, claudication, diaphoresis (for about 1 hour), dizziness (earlier but resolving), fatigue, nausea (during episode, but resolved currently), near-syncope, orthopnea, palpitations, shortness of breath (earlier with episode) and weakness    Associated symptoms: no altered mental status, no anorexia, no anxiety, no cough, no dysphagia, no fever, no headache, no heartburn (but does have history), no lower extremity edema, no numbness, no syncope and no vomiting    Abdominal pain:     Pain location: chronic pain due to hernia per patient.  Fatigue:     Severity:  Severe    Timing:  Constant    Progression:  Unchanged  Nausea:     Severity:  Moderate    Onset quality:  Sudden    Duration:  1 hour    Timing:  Constant    Progression:  Resolved  Shortness of breath:     Severity:  Moderate    Onset quality:  Sudden    Duration:  1 hour    Timing:  Constant    Progression:  Resolved  Risk factors: diabetes mellitus, high cholesterol, immobilization and obesity    Risk factors: no aortic disease, no birth control, no coronary artery disease, no hypertension, not male, no Marfan's syndrome, not pregnant, no prior DVT/PE, no smoking (former smoker, quit 4 years ago) and no surgery    Patient was sitting at a meeting at 1pm in work and all the sudden she had a cold sweat, patient turned white and had a sudden crushing pain 8/10.  Patient did not check blood sugar at that  time.    Review of Systems   Constitutional: Positive for diaphoresis (for about 1 hour) and fatigue. Negative for fever.   HENT: Positive for sneezing. Negative for congestion, ear pain, hearing loss, nosebleeds, postnasal drip, rhinorrhea, sinus pressure, sore throat, tinnitus and trouble swallowing.    Eyes: Negative for pain, redness and itching.   Respiratory: Positive for chest tightness and shortness of breath (earlier with episode). Negative for cough and wheezing.    Cardiovascular: Positive for chest pain, palpitations, orthopnea, claudication and near-syncope. Negative for syncope.   Gastrointestinal: Positive for abdominal pain (over umbilical hernia) and nausea (during episode, but resolved currently). Negative for anorexia, blood in stool, constipation, diarrhea, heartburn (but does have history) and vomiting.   Endocrine: Negative.    Genitourinary: Negative for dysuria, hematuria and vaginal bleeding.   Musculoskeletal: Positive for back pain. Negative for gait problem and neck pain.   Skin: Negative for rash and wound.   Neurological: Positive for dizziness (earlier but resolving), seizures (remote history) and weakness. Negative for numbness and headaches.   Psychiatric/Behavioral: Negative for agitation, hallucinations and suicidal ideas. The patient is not nervous/anxious.        Past Medical History:   Diagnosis Date   • Asthma    • Congestive heart failure    • Diabetes mellitus    • Disease of thyroid gland     hypothyroidism    • Enlarged heart    • Migraine        Allergies   Allergen Reactions   • Lortab [Hydrocodone-Acetaminophen]        Past Surgical History:   Procedure Laterality Date   • A-V CARDIAC PACEMAKER INSERTION     • APPENDECTOMY     • ATRIAL CARDIAC PACEMAKER INSERTION     • CARDIAC CATHETERIZATION     • CARDIAC SURGERY     • CARDIAC VALVE SURGERY     • CYSTOSCOPY  09/23/2015   • HYSTERECTOMY     • TONSILLECTOMY     • TRANSESOPHAGEAL ECHOCARDIOGRAM (CABRERA)         Family History    Problem Relation Age of Onset   • Diabetes Father    • Hypertension Father    • Diabetes Maternal Grandmother    • Kidney disease Maternal Grandmother    • Hypertension Maternal Grandmother    • Heart disease Maternal Grandmother    • Stroke Maternal Grandmother    • Thyroid disease Maternal Grandmother    • Cancer Maternal Grandfather    • Kidney disease Paternal Grandmother    • Diabetes Paternal Grandfather        Social History     Social History   • Marital status:      Spouse name: N/A   • Number of children: N/A   • Years of education: N/A     Social History Main Topics   • Smoking status: Former Smoker   • Smokeless tobacco: None   • Alcohol use No   • Drug use: No   • Sexual activity: Not Asked     Other Topics Concern   • None     Social History Narrative           Objective   Physical Exam    Procedures         ED Course  ED Course   Comment By Time   Patient seen and evaluated then discussed with Dr. SILVIANO Lr.  Dr. Sellers contacted as he is the cardiologist on call.  He says as her work up is negative she can be safely discharged and encouraged to keep appointment with Dr. Gonsalez tomorrow. Phyllis Orozco MD 04/18 7453        Results for orders placed or performed during the hospital encounter of 04/18/17   Troponin   Result Value Ref Range    Troponin I <0.012 <=0.034 ng/mL   Comprehensive Metabolic Panel   Result Value Ref Range    Glucose 124 (H) 60 - 100 mg/dL    BUN 15 7 - 21 mg/dL    Creatinine 0.76 0.50 - 1.00 mg/dL    Sodium 139 137 - 145 mmol/L    Potassium 4.1 3.5 - 5.1 mmol/L    Chloride 101 95 - 110 mmol/L    CO2 25.0 22.0 - 31.0 mmol/L    Calcium 9.4 8.4 - 10.2 mg/dL    Total Protein 7.0 6.3 - 8.6 g/dL    Albumin 4.10 3.40 - 4.80 g/dL    ALT (SGPT) 39 9 - 52 U/L    AST (SGOT) 52 (H) 14 - 36 U/L    Alkaline Phosphatase 50 38 - 126 U/L    Total Bilirubin 0.4 0.2 - 1.3 mg/dL    eGFR Non African Amer 85 >60 mL/min/1.73    Globulin 2.9 2.3 - 3.5 gm/dL    A/G Ratio 1.4 1.1 - 1.8 g/dL     BUN/Creatinine Ratio 19.7 7.0 - 25.0    Anion Gap 13.0 5.0 - 15.0 mmol/L   BNP   Result Value Ref Range    proBNP 889.0 (H) 0.0 - 450.0 pg/mL   Protime-INR   Result Value Ref Range    Protime 34.3 (H) 11.1 - 15.3 Seconds    INR 3.33 (H) 0.80 - 1.20   aPTT   Result Value Ref Range    PTT 41.7 (H) 20.0 - 40.3 seconds   CBC Auto Differential   Result Value Ref Range    WBC 10.52 (H) 3.20 - 9.80 10*3/mm3    RBC 4.67 3.77 - 5.16 10*6/mm3    Hemoglobin 12.7 12.0 - 15.5 g/dL    Hematocrit 37.7 35.0 - 45.0 %    MCV 80.7 80.0 - 98.0 fL    MCH 27.2 26.5 - 34.0 pg    MCHC 33.7 31.4 - 36.0 g/dL    RDW 13.4 11.5 - 14.5 %    RDW-SD 39.5 36.4 - 46.3 fl    MPV 8.7 8.0 - 12.0 fL    Platelets 367 150 - 450 10*3/mm3    Neutrophil % 74.7 37.0 - 80.0 %    Lymphocyte % 18.6 10.0 - 50.0 %    Monocyte % 6.1 0.0 - 12.0 %    Eosinophil % 0.2 0.0 - 7.0 %    Basophil % 0.2 0.0 - 2.0 %    Immature Grans % 0.2 0.0 - 0.5 %    Neutrophils, Absolute 7.86 2.00 - 8.60 10*3/mm3    Lymphocytes, Absolute 1.96 0.60 - 4.20 10*3/mm3    Monocytes, Absolute 0.64 0.00 - 0.90 10*3/mm3    Eosinophils, Absolute 0.02 0.00 - 0.70 10*3/mm3    Basophils, Absolute 0.02 0.00 - 0.20 10*3/mm3    Immature Grans, Absolute 0.02 0.00 - 0.02 10*3/mm3   Light Blue Top   Result Value Ref Range    Extra Tube hold for add-on    Green Top (Gel)   Result Value Ref Range    Extra Tube Hold for add-ons.    Lavender Top   Result Value Ref Range    Extra Tube hold for add-on    Gold Top - SST   Result Value Ref Range    Extra Tube Hold for add-ons.                MDM    Final diagnoses:   Chest pain, unspecified type               This document has been electronically signed by Phyllis Orozco MD on April 18, 2017 5:41 PM       Phyllis Orozco MD  Resident  04/18/17 0434       Phyllis Orozco MD  Resident  04/18/17 5909

## 2017-04-18 NOTE — ED NOTES
Pt presents to ED with c/o chest pain onset this weekend. Pt was seen et treated in ED for similar complaint on Friday. Pt was dc'ed home et did not have any pain until this am.  Pt had returned to work et      Zandra Mcclain RN  04/18/17 [id number]

## 2017-04-19 ENCOUNTER — OFFICE VISIT (OUTPATIENT)
Dept: CARDIOLOGY | Facility: CLINIC | Age: 37
End: 2017-04-19

## 2017-04-19 VITALS
DIASTOLIC BLOOD PRESSURE: 80 MMHG | HEART RATE: 78 BPM | WEIGHT: 217 LBS | SYSTOLIC BLOOD PRESSURE: 120 MMHG | BODY MASS INDEX: 34.87 KG/M2 | HEIGHT: 66 IN

## 2017-04-19 DIAGNOSIS — Z95.2 HX OF MITRAL VALVE REPLACEMENT WITH MECHANICAL VALVE: ICD-10-CM

## 2017-04-19 DIAGNOSIS — I42.1 HYPERTROPHIC OBSTRUCTIVE CARDIOMYOPATHY (HOCM) (HCC): Primary | ICD-10-CM

## 2017-04-19 DIAGNOSIS — E11.9 TYPE 2 DIABETES MELLITUS WITHOUT COMPLICATION, WITHOUT LONG-TERM CURRENT USE OF INSULIN (HCC): ICD-10-CM

## 2017-04-19 PROCEDURE — 99213 OFFICE O/P EST LOW 20 MIN: CPT | Performed by: INTERNAL MEDICINE

## 2017-04-19 NOTE — PROGRESS NOTES
Frances Gutierrez [facility]  36 y.o. female    12/07/2016  1. Hypertrophic obstructive cardiomyopathy (HOCM)    2. Type 2 diabetes mellitus without complication, without long-term current use of insulin    3. Hx of mitral valve replacement with mechanical valve [Z95.2]        Chief complaint - follow-up hypertrophic cardiomyopathy      History of present Illness- 36-year-old lady who has hypertrophic obstructive cardiomyopathy, who had myomectomy and ICD placement and she is doing well and she has history of mitral valve replacement with a mechanical prosthesis, on chronic Coumadin therapy INR is to be kept between 2.5-3.5.  She denies any problems.  She had her hysterectomy due to dysfunctional uterine bleeding and she is doing well and has healed up well.  She denies any GI symptoms of neurological problems.  She denies any weight gain or weight loss.  She has been to the ER multiple times with chest pain.  She does not have any coronary disease by cardiac catheterization prior to her mitral valve replacement.  I'm going to enroll her in the adult congenital heart disease clinic at [facility].      Allergies   Allergen Reactions   • Lortab [Hydrocodone-Acetaminophen]          Past Medical History:   Diagnosis Date   • Asthma    • Congestive heart failure    • Diabetes mellitus    • Disease of thyroid gland     hypothyroidism    • Enlarged heart    • Migraine          Past Surgical History:   Procedure Laterality Date   • A-V CARDIAC PACEMAKER INSERTION     • APPENDECTOMY     • ATRIAL CARDIAC PACEMAKER INSERTION     • CARDIAC CATHETERIZATION     • CARDIAC SURGERY     • CARDIAC VALVE SURGERY     • CYSTOSCOPY  09/23/2015   • HYSTERECTOMY     • TONSILLECTOMY     • TRANSESOPHAGEAL ECHOCARDIOGRAM (CABRERA)           Family History   Problem Relation Age of Onset   • Diabetes Father    • Hypertension Father    • Diabetes Maternal Grandmother    • Kidney disease Maternal Grandmother    • Hypertension Maternal Grandmother    •  Heart disease Maternal Grandmother    • Stroke Maternal Grandmother    • Thyroid disease Maternal Grandmother    • Cancer Maternal Grandfather    • Kidney disease Paternal Grandmother    • Diabetes Paternal Grandfather          Social History     Social History   • Marital status:      Spouse name: N/A   • Number of children: N/A   • Years of education: N/A     Occupational History   • Not on file.     Social History Main Topics   • Smoking status: Former Smoker   • Smokeless tobacco: Never Used   • Alcohol use No   • Drug use: No   • Sexual activity: Defer     Other Topics Concern   • Not on file     Social History Narrative         Current Outpatient Prescriptions   Medication Sig Dispense Refill   • atorvastatin (LIPITOR) 20 MG tablet Take 20 mg by mouth Daily.     • fenofibrate 160 MG tablet Take 160 mg by mouth Daily.     • furosemide (LASIX) 40 MG tablet Take 1 tablet by mouth Daily. 30 tablet 5   • glipiZIDE (GLUCOTROL) 5 MG tablet Take 1 tablet by mouth 2 (Two) Times a Day Before Meals. 60 tablet 5   • levothyroxine (SYNTHROID, LEVOTHROID) 50 MCG tablet Take 1 tab po Tuesday, Thursday, Saturday, Sunday and 1/2 tab po Monday, Wednesday, Friday 28 tablet 5   • metFORMIN (GLUCOPHAGE) 1000 MG tablet Take 1 tablet by mouth 2 (Two) Times a Day. 60 tablet 6   • oxyCODONE-acetaminophen (PERCOCET) 5-325 MG per tablet Take 1 tablet by mouth Every 6 (Six) Hours As Needed for Moderate Pain (4-6). 15 tablet 0   • pantoprazole (PROTONIX) 40 MG EC tablet Take 1 tablet by mouth Daily. 30 tablet 6   • SITagliptin (JANUVIA) 100 MG tablet Take 1 tablet by mouth Daily. 30 tablet 5   • sotalol (BETAPACE) 80 MG tablet TAKE ONE TABLET BY MOUTH TWICE A DAY 60 tablet 5   • warfarin (COUMADIN) 5 MG tablet Take 1 tablet by mouth Dose Adjusted By Provider As Needed. 90 tablet 6     No current facility-administered medications for this visit.          Review of Systems     Constitution: Denies any fatigue, fever or  "chills    HENT: Denies any headache, hearing impairment, nasal discharge or sore throat    Eyes: Denies any blurring of vision, or photophobia     Cardivascular - As per history of present illness     Respiratory system-denies any COPD, shortness of breath, sleep apnea.     Endocrine:   history of hyperlipidemia, diabetes       Musculoskeletal:  No history of arthritis, musculoskeletal problems    Gastrointestinal: No nausea, vomiting, or melena    Genitourinary: No dysuria or hematuria    Neurological:   No history of seizure disorder, stroke, memory problems    Psychiatric/Behavioral:         history of depression    Hematological- no history of easy bruising or any bleeding diathesis            OBJECTIVE    /80  Pulse 78  Ht 66\" (167.6 cm)  Wt 217 lb (98.4 kg)  BMI 35.02 kg/m2      Physical Exam     Constitutional: is oriented to person, place, and time.     Skin-warm and dry    Well developed and nourished in no acute distress      Head: Normocephalic and atraumatic.     Eyes: Pupils are equal, round, and reactive to light.     Neck: Neck supple. No bruit in the carotids, no elevation of JVD    Cardiovascular: [place] in the fifth intercostal space, regular rate, and  Rhythm,  S1 greater than S2, no S3 or S4, prosthetic valve sound heard well    Pulmonary/Chest:   Air  Entry is equal on both sides  No wheezing or crackles,      Abdominal: Soft.  No hepatosplenomegaly, bowel sounds are present    Musculoskeletal: No kyphoscoliosis, no significant thickening of the joints    Neurological: is alert and oriented to person, place, and time.    cranial nerve are intact .   No motor or sensory deficit    Extremities-no edema,  pulses are felt equally on both sides, no radial femoral delay      Psychiatric: He has a normal mood and affect.                  His behavior is normal.           Procedures      Lab Results   Component Value Date    WBC 10.52 (H) 04/18/2017    HGB 12.7 04/18/2017    HCT 37.7 04/18/2017 "    MCV 80.7 04/18/2017     04/18/2017     Lab Results   Component Value Date    GLUCOSE 124 (H) 04/18/2017    BUN 15 04/18/2017    CREATININE 0.76 04/18/2017    EGFRIFNONA 85 04/18/2017    BCR 19.7 04/18/2017    CO2 25.0 04/18/2017    CALCIUM 9.4 04/18/2017    ALBUMIN 4.10 04/18/2017    LABIL2 1.4 04/18/2017    AST 52 (H) 04/18/2017    ALT 39 04/18/2017     No results found for: CHOL  Lab Results   Component Value Date    TRIG 292 (H) 11/17/2016    TRIG 264 (H) 08/12/2016    TRIG 1053 (H) 05/03/2016     Lab Results   Component Value Date    HDL 40 (L) 11/17/2016    HDL 37 (L) 12/01/2015    HDL 34 (L) 07/03/2015     Lab Results   Component Value Date    LDLCALC 108 11/17/2016    LDLCALC 102 08/12/2016    LDLCALC 111 12/01/2015     Lab Results   Component Value Date    LDL 81 07/03/2015    LDL Unable to calculate due to elevated Triglycerides. 02/09/2015     No results found for: HDLLDLRATIO  No components found for: CHOLHDL  Lab Results   Component Value Date    HGBA1C 8.25 (H) 02/20/2017     Lab Results   Component Value Date    TSH 6.970 (H) 04/14/2017                  A/P    Recurrent chest pain-no CAD by cardiac catheterization has HOCM.  Reassured her, will enroll her in the adult congenital heart disease clinic at [facility].    Hypertrophic obstructive cardio myopathy-status post myomectomy and ICD placement doing well on sotalol for ventricular arrhythmias doing well.    Diabetes on glipizide and metformin and she had multiple follicle or abscesses and is being drained.    Mixed hyperlipidemia on fenofibrate and atorvastatin and her lipids are good and on Synthroid supplements for hypothyroidism.    GERD on Protonix and doing okay.    Status post mitral valve replacement with [place] mechanical prosthesis-on Coumadin and INR is kept in 2.5-3.    Follow-up in 6 months      Wallace Haro MD  4/19/2017  4:43 PM

## 2017-04-20 ENCOUNTER — DOCUMENTATION (OUTPATIENT)
Dept: CARDIOLOGY | Facility: CLINIC | Age: 37
End: 2017-04-20

## 2017-04-20 NOTE — PROGRESS NOTES
Apt made for pt at [facility] per Dr Haro

## 2017-04-21 ENCOUNTER — ANTICOAGULATION VISIT (OUTPATIENT)
Dept: CARDIOLOGY | Facility: CLINIC | Age: 37
End: 2017-04-21

## 2017-04-21 ENCOUNTER — APPOINTMENT (OUTPATIENT)
Dept: LAB | Facility: HOSPITAL | Age: 37
End: 2017-04-21

## 2017-04-21 DIAGNOSIS — Z95.2 HEART VALVE REPLACED: Primary | ICD-10-CM

## 2017-04-21 DIAGNOSIS — Z95.2 HX OF MITRAL VALVE REPLACEMENT WITH MECHANICAL VALVE: ICD-10-CM

## 2017-04-21 LAB
INR PPP: 3.3
INR PPP: 3.3 (ref 0.8–1.2)

## 2017-04-21 PROCEDURE — 85610 PROTHROMBIN TIME: CPT | Performed by: INTERNAL MEDICINE

## 2017-04-21 RX ORDER — WARFARIN SODIUM 5 MG/1
5 TABLET ORAL
Qty: 180 TABLET | Refills: 6 | Status: SHIPPED | OUTPATIENT
Start: 2017-04-21 | End: 2017-05-19 | Stop reason: SDUPTHER

## 2017-05-12 ENCOUNTER — RESULTS ENCOUNTER (OUTPATIENT)
Dept: CARDIOLOGY | Facility: CLINIC | Age: 37
End: 2017-05-12

## 2017-05-12 DIAGNOSIS — Z95.2 MECHANICAL HEART VALVE PRESENT: ICD-10-CM

## 2017-05-19 ENCOUNTER — OFFICE VISIT (OUTPATIENT)
Dept: FAMILY MEDICINE CLINIC | Facility: CLINIC | Age: 37
End: 2017-05-19

## 2017-05-19 VITALS
DIASTOLIC BLOOD PRESSURE: 80 MMHG | WEIGHT: 220.1 LBS | SYSTOLIC BLOOD PRESSURE: 122 MMHG | HEIGHT: 66 IN | OXYGEN SATURATION: 99 % | BODY MASS INDEX: 35.37 KG/M2 | HEART RATE: 61 BPM

## 2017-05-19 DIAGNOSIS — E78.5 HYPERLIPIDEMIA, UNSPECIFIED HYPERLIPIDEMIA TYPE: ICD-10-CM

## 2017-05-19 DIAGNOSIS — E11.9 TYPE 2 DIABETES MELLITUS WITHOUT COMPLICATION, WITHOUT LONG-TERM CURRENT USE OF INSULIN (HCC): ICD-10-CM

## 2017-05-19 DIAGNOSIS — E03.9 HYPOTHYROIDISM, UNSPECIFIED TYPE: Primary | ICD-10-CM

## 2017-05-19 PROCEDURE — 99214 OFFICE O/P EST MOD 30 MIN: CPT | Performed by: FAMILY MEDICINE

## 2017-05-19 RX ORDER — ATORVASTATIN CALCIUM 20 MG/1
20 TABLET, FILM COATED ORAL DAILY
Qty: 90 TABLET | Refills: 3 | Status: SHIPPED | OUTPATIENT
Start: 2017-05-19 | End: 2018-03-19 | Stop reason: DRUGHIGH

## 2017-05-19 RX ORDER — PANTOPRAZOLE SODIUM 40 MG/1
40 TABLET, DELAYED RELEASE ORAL DAILY
Qty: 90 TABLET | Refills: 3 | Status: SHIPPED | OUTPATIENT
Start: 2017-05-19

## 2017-05-19 RX ORDER — FUROSEMIDE 40 MG/1
40 TABLET ORAL DAILY
Qty: 90 TABLET | Refills: 3 | Status: SHIPPED | OUTPATIENT
Start: 2017-05-19 | End: 2018-09-20

## 2017-05-19 RX ORDER — WARFARIN SODIUM 5 MG/1
5 TABLET ORAL
Qty: 180 TABLET | Refills: 6 | Status: SHIPPED | OUTPATIENT
Start: 2017-05-19 | End: 2019-06-19

## 2017-05-19 RX ORDER — LEVOTHYROXINE SODIUM 0.05 MG/1
TABLET ORAL
Qty: 90 TABLET | Refills: 3 | Status: SHIPPED | OUTPATIENT
Start: 2017-05-19 | End: 2017-06-01 | Stop reason: SDUPTHER

## 2017-05-19 RX ORDER — SOTALOL HYDROCHLORIDE 80 MG/1
80 TABLET ORAL 2 TIMES DAILY
Qty: 180 TABLET | Refills: 3 | Status: SHIPPED | OUTPATIENT
Start: 2017-05-19 | End: 2017-08-11

## 2017-05-19 RX ORDER — GLIPIZIDE 5 MG/1
5 TABLET ORAL
Qty: 60 TABLET | Refills: 5 | Status: CANCELLED | OUTPATIENT
Start: 2017-05-19

## 2017-05-19 RX ORDER — FENOFIBRATE 160 MG/1
160 TABLET ORAL DAILY
Qty: 90 TABLET | Refills: 3 | Status: SHIPPED | OUTPATIENT
Start: 2017-05-19 | End: 2019-06-19

## 2017-05-23 ENCOUNTER — APPOINTMENT (OUTPATIENT)
Dept: LAB | Facility: HOSPITAL | Age: 37
End: 2017-05-23

## 2017-05-23 ENCOUNTER — ANTICOAGULATION VISIT (OUTPATIENT)
Dept: CARDIOLOGY | Facility: CLINIC | Age: 37
End: 2017-05-23

## 2017-05-23 DIAGNOSIS — Z95.2 HX OF MITRAL VALVE REPLACEMENT WITH MECHANICAL VALVE: ICD-10-CM

## 2017-05-23 DIAGNOSIS — Z95.2 HEART VALVE REPLACED: Primary | ICD-10-CM

## 2017-05-23 LAB — INR PPP: 3.5

## 2017-05-23 PROCEDURE — 36415 COLL VENOUS BLD VENIPUNCTURE: CPT

## 2017-05-24 ENCOUNTER — LAB (OUTPATIENT)
Dept: LAB | Facility: HOSPITAL | Age: 37
End: 2017-05-24

## 2017-05-24 DIAGNOSIS — E03.9 HYPOTHYROIDISM, UNSPECIFIED TYPE: ICD-10-CM

## 2017-05-24 DIAGNOSIS — E11.9 TYPE 2 DIABETES MELLITUS WITHOUT COMPLICATION, WITHOUT LONG-TERM CURRENT USE OF INSULIN (HCC): ICD-10-CM

## 2017-05-24 DIAGNOSIS — Z95.2 MECHANICAL HEART VALVE PRESENT: ICD-10-CM

## 2017-05-24 LAB
ARTICHOKE IGE QN: 119 MG/DL (ref 1–129)
CHOLEST SERPL-MCNC: 174 MG/DL (ref 0–199)
HBA1C MFR BLD: 7.65 % (ref 4–5.6)
HDLC SERPL-MCNC: 31 MG/DL (ref 60–200)
LDLC/HDLC SERPL: 3.52 {RATIO} (ref 0–3.22)
T4 FREE SERPL-MCNC: 1.18 NG/DL (ref 0.78–2.19)
TRIGL SERPL-MCNC: 170 MG/DL (ref 20–199)
TSH SERPL DL<=0.05 MIU/L-ACNC: 7.86 MIU/ML (ref 0.46–4.68)

## 2017-05-24 PROCEDURE — 84439 ASSAY OF FREE THYROXINE: CPT | Performed by: FAMILY MEDICINE

## 2017-05-24 PROCEDURE — 84443 ASSAY THYROID STIM HORMONE: CPT | Performed by: FAMILY MEDICINE

## 2017-05-24 PROCEDURE — 83036 HEMOGLOBIN GLYCOSYLATED A1C: CPT | Performed by: FAMILY MEDICINE

## 2017-05-24 PROCEDURE — 80061 LIPID PANEL: CPT | Performed by: FAMILY MEDICINE

## 2017-05-31 DIAGNOSIS — E03.9 HYPOTHYROIDISM, UNSPECIFIED TYPE: ICD-10-CM

## 2017-06-01 DIAGNOSIS — E03.9 HYPOTHYROIDISM, UNSPECIFIED TYPE: ICD-10-CM

## 2017-06-01 RX ORDER — LEVOTHYROXINE SODIUM 0.05 MG/1
50 TABLET ORAL DAILY
Qty: 90 TABLET | Refills: 3 | Status: SHIPPED | OUTPATIENT
Start: 2017-06-01

## 2017-06-02 ENCOUNTER — TELEPHONE (OUTPATIENT)
Dept: FAMILY MEDICINE CLINIC | Facility: CLINIC | Age: 37
End: 2017-06-02

## 2017-06-02 NOTE — TELEPHONE ENCOUNTER
Pr , Ms. Motta has been called with her recent lab results & recommendations.  Continue her current medications and follow-up as planned or sooner if any problems.

## 2017-06-06 ENCOUNTER — APPOINTMENT (OUTPATIENT)
Dept: LAB | Facility: HOSPITAL | Age: 37
End: 2017-06-06

## 2017-06-06 ENCOUNTER — ANTICOAGULATION VISIT (OUTPATIENT)
Dept: CARDIOLOGY | Facility: CLINIC | Age: 37
End: 2017-06-06

## 2017-06-06 DIAGNOSIS — Z95.2 HX OF MITRAL VALVE REPLACEMENT WITH MECHANICAL VALVE: ICD-10-CM

## 2017-06-06 DIAGNOSIS — Z95.2 HEART VALVE REPLACED: Primary | ICD-10-CM

## 2017-06-06 LAB
INR PPP: 2.3
INR PPP: 2.3 (ref 0.8–1.2)

## 2017-06-06 PROCEDURE — 85610 PROTHROMBIN TIME: CPT | Performed by: INTERNAL MEDICINE

## 2017-06-30 ENCOUNTER — OFFICE VISIT (OUTPATIENT)
Dept: FAMILY MEDICINE CLINIC | Facility: CLINIC | Age: 37
End: 2017-06-30

## 2017-06-30 VITALS
OXYGEN SATURATION: 100 % | DIASTOLIC BLOOD PRESSURE: 78 MMHG | HEIGHT: 66 IN | BODY MASS INDEX: 34.11 KG/M2 | WEIGHT: 212.25 LBS | HEART RATE: 67 BPM | SYSTOLIC BLOOD PRESSURE: 138 MMHG

## 2017-06-30 DIAGNOSIS — E11.9 TYPE 2 DIABETES MELLITUS WITHOUT COMPLICATION, WITHOUT LONG-TERM CURRENT USE OF INSULIN (HCC): Primary | ICD-10-CM

## 2017-06-30 DIAGNOSIS — E03.9 HYPOTHYROIDISM, UNSPECIFIED TYPE: ICD-10-CM

## 2017-06-30 PROCEDURE — 99213 OFFICE O/P EST LOW 20 MIN: CPT | Performed by: FAMILY MEDICINE

## 2017-07-07 ENCOUNTER — ANTICOAGULATION VISIT (OUTPATIENT)
Dept: CARDIOLOGY | Facility: CLINIC | Age: 37
End: 2017-07-07

## 2017-07-07 ENCOUNTER — APPOINTMENT (OUTPATIENT)
Dept: LAB | Facility: HOSPITAL | Age: 37
End: 2017-07-07

## 2017-07-07 ENCOUNTER — RESULTS ENCOUNTER (OUTPATIENT)
Dept: CARDIOLOGY | Facility: CLINIC | Age: 37
End: 2017-07-07

## 2017-07-07 DIAGNOSIS — Z95.2 HX OF MITRAL VALVE REPLACEMENT WITH MECHANICAL VALVE: ICD-10-CM

## 2017-07-07 DIAGNOSIS — Z95.2 MECHANICAL HEART VALVE PRESENT: ICD-10-CM

## 2017-07-07 DIAGNOSIS — Z95.2 HEART VALVE REPLACED: Primary | ICD-10-CM

## 2017-07-07 LAB
INR PPP: 3.5
INR PPP: 3.5

## 2017-08-02 ENCOUNTER — ANTICOAGULATION VISIT (OUTPATIENT)
Dept: CARDIOLOGY | Facility: CLINIC | Age: 37
End: 2017-08-02

## 2017-08-02 DIAGNOSIS — Z95.2 HX OF MITRAL VALVE REPLACEMENT WITH MECHANICAL VALVE: ICD-10-CM

## 2017-08-02 LAB — INR PPP: 3.3

## 2017-08-02 NOTE — PATIENT INSTRUCTIONS
Pt instructed to continue medication regimen as ordered, dietary counseling given due to recent TIA, verbalized understanding

## 2017-08-04 ENCOUNTER — RESULTS ENCOUNTER (OUTPATIENT)
Dept: CARDIOLOGY | Facility: CLINIC | Age: 37
End: 2017-08-04

## 2017-08-04 DIAGNOSIS — Z95.2 MECHANICAL HEART VALVE PRESENT: ICD-10-CM

## 2017-08-09 ENCOUNTER — APPOINTMENT (OUTPATIENT)
Dept: LAB | Facility: HOSPITAL | Age: 37
End: 2017-08-09

## 2017-08-09 LAB
HBA1C MFR BLD: 7.1 % (ref 4–5.6)
T4 FREE SERPL-MCNC: 1.26 NG/DL (ref 0.78–2.19)
TSH SERPL DL<=0.05 MIU/L-ACNC: 1.54 MIU/ML (ref 0.46–4.68)

## 2017-08-09 PROCEDURE — 84439 ASSAY OF FREE THYROXINE: CPT | Performed by: FAMILY MEDICINE

## 2017-08-09 PROCEDURE — 84443 ASSAY THYROID STIM HORMONE: CPT | Performed by: FAMILY MEDICINE

## 2017-08-09 PROCEDURE — 83036 HEMOGLOBIN GLYCOSYLATED A1C: CPT | Performed by: FAMILY MEDICINE

## 2017-08-09 PROCEDURE — 36415 COLL VENOUS BLD VENIPUNCTURE: CPT | Performed by: FAMILY MEDICINE

## 2017-08-11 ENCOUNTER — OFFICE VISIT (OUTPATIENT)
Dept: FAMILY MEDICINE CLINIC | Facility: CLINIC | Age: 37
End: 2017-08-11

## 2017-08-11 VITALS
HEIGHT: 66 IN | HEART RATE: 65 BPM | OXYGEN SATURATION: 99 % | SYSTOLIC BLOOD PRESSURE: 100 MMHG | BODY MASS INDEX: 33.14 KG/M2 | DIASTOLIC BLOOD PRESSURE: 74 MMHG | WEIGHT: 206.2 LBS

## 2017-08-11 DIAGNOSIS — E03.9 HYPOTHYROIDISM, UNSPECIFIED TYPE: ICD-10-CM

## 2017-08-11 DIAGNOSIS — Z95.2 HX OF MITRAL VALVE REPLACEMENT WITH MECHANICAL VALVE: ICD-10-CM

## 2017-08-11 DIAGNOSIS — E11.9 TYPE 2 DIABETES MELLITUS WITHOUT COMPLICATION, WITHOUT LONG-TERM CURRENT USE OF INSULIN (HCC): Primary | ICD-10-CM

## 2017-08-11 PROCEDURE — 99214 OFFICE O/P EST MOD 30 MIN: CPT | Performed by: FAMILY MEDICINE

## 2017-08-11 NOTE — PROGRESS NOTES
Subjective   Chief Complaint   Patient presents with   • Diabetes     6 week follow up has been in the [facility]       Frances Motta is a 37 y.o. female who presents for Diabetes (6 week follow up has been in the [facility])   Diabetes   She presents for her follow-up diabetic visit. She has type 2 diabetes mellitus. There are no hypoglycemic associated symptoms. Pertinent negatives for hypoglycemia include no dizziness or speech difficulty. Associated symptoms include weight loss (intentional). Pertinent negatives for diabetes include no chest pain, no fatigue, no foot paresthesias, no polydipsia, no polyphagia, no polyuria and no weakness. Symptoms are stable. Current diabetic treatment includes insulin injections and oral agent (monotherapy). She is compliant with treatment all of the time. Her weight is decreasing steadily. She is following a diabetic (75 g carbs/meal) diet. Meal planning includes carbohydrate counting. Home blood sugar record trend: -160, Post-prandial 109-233. An ACE inhibitor/angiotensin II receptor blocker is being taken.   she has been following dm diet very closely and doing well with it. Working on losing weight. Post prandial blood sugars have been better since she started this since d/c from hospital. They have ranged . Fasting blood sugar 108-140.   Lab Results   Component Value Date    HGBA1C 7.1 (H) 08/09/2017       She was at [facility] 7/19-7/31 for TIA. Was found to have a thrombus on her mechanical mitral valve. TIA resolved.. No residual deficits. She has follow up Appointment with her cardiologist Dr. Gonsalez next week.  She is still followed by the Coumadin clinic very closely for her INR with goal of 2.5-3.5 and they're repeating it next week.  She also had an incidental finding in the hospital of an MCA aneurysm.  She was scheduled to follow-up with a neurosurgeon Dr. Palma for this. She is doing well. Current  asymptomatic.   She was told at the hospital that she could no longer work and needed to go on disability. She is filing for disability.     The following portions of the patient's history were reviewed and updated as appropriate:problem list, current medications, allergies, past family history, past medical history, past social history and past surgical history    Past Medical History:   Diagnosis Date   • AICD (automatic cardioverter/defibrillator) present    • Anticoagulation goal of INR 2.5 to 3.5    • Asthma    • Cardiomyopathy    • Congestive heart failure    • Diabetes mellitus    • Disease of thyroid gland     hypothyroidism    • Enlarged heart    • Hx of mitral valve replacement with mechanical valve     with thrombus 7/2017   • Migraine    • TIA (transient ischemic attack)        Social History   Substance Use Topics   • Smoking status: Former Smoker   • Smokeless tobacco: Never Used   • Alcohol use No       Medications:  Outpatient Medications Prior to Visit   Medication Sig Dispense Refill   • atorvastatin (LIPITOR) 20 MG tablet Take 1 tablet by mouth Daily. 90 tablet 3   • fenofibrate 160 MG tablet Take 1 tablet by mouth Daily. 90 tablet 3   • furosemide (LASIX) 40 MG tablet Take 1 tablet by mouth Daily. 90 tablet 3   • Insulin Glargine (LANTUS SOLOSTAR) 100 UNIT/ML injection pen Inject 15 Units under the skin Every Night. 5 pen 3   • levothyroxine (SYNTHROID, LEVOTHROID) 50 MCG tablet Take 1 tablet by mouth Daily. 90 tablet 3   • metFORMIN (GLUCOPHAGE) 1000 MG tablet Take 1 tablet by mouth 2 (Two) Times a Day. 60 tablet 6   • pantoprazole (PROTONIX) 40 MG EC tablet Take 1 tablet by mouth Daily. 90 tablet 3   • warfarin (COUMADIN) 5 MG tablet Take 1 tablet by mouth Dose Adjusted By Provider As Needed. 2 po Monday through Thursday Friday sat sun 3 tabs 180 tablet 6   • sotalol (BETAPACE) 80 MG tablet Take 1 tablet by mouth 2 (Two) Times a Day. 180 tablet 3     No facility-administered medications prior  "to visit.        Allergies   Allergen Reactions   • Lortab [Hydrocodone-Acetaminophen]        Review of Systems   Constitutional: Positive for weight loss (intentional). Negative for fatigue, fever and unexpected weight change.   HENT: Negative.    Eyes: Negative.    Respiratory: Negative for shortness of breath.    Cardiovascular: Negative for chest pain, palpitations and leg swelling.   Gastrointestinal: Negative for abdominal pain, constipation, diarrhea, nausea and vomiting.   Endocrine: Negative.  Negative for polydipsia, polyphagia and polyuria.        No hypoglycemia   Genitourinary: Negative.  Negative for frequency.   Musculoskeletal: Negative.    Skin: Negative.    Neurological: Negative for dizziness, speech difficulty and weakness.   Psychiatric/Behavioral: Negative for dysphoric mood and sleep disturbance.       Objective   Visit Vitals   • /74   • Pulse 65   • Ht 66\" (167.6 cm)   • Wt 206 lb 3.2 oz (93.5 kg)   • LMP 2015 (Within Months)   • SpO2 99%   • BMI 33.28 kg/m2       Physical Exam   Constitutional: She is oriented to person, place, and time. She appears well-developed and well-nourished. No distress.   HENT:   Head: Normocephalic.   Nose: Nose normal.   Eyes: Conjunctivae are normal.   Neck: Normal range of motion.   Pulmonary/Chest: Effort normal. No respiratory distress.   Abdominal: She exhibits no distension.   Musculoskeletal: Normal range of motion. She exhibits no edema.   Neurological: She is alert and oriented to person, place, and time. She has normal strength. No cranial nerve deficit. Coordination and gait normal.   No focal deficits   Skin: She is not diaphoretic.   Psychiatric: She has a normal mood and affect. Her behavior is normal.   Nursing note and vitals reviewed.      Assessment/Plan   Frances Motta is a 37 y.o. female seen today for the followin. Type 2 diabetes mellitus without complication, without long-term current use of insulin  Continue " current regimen. Continue low carb diet. Follow up 3 months with lab before    - Hemoglobin A1c; Future  - Basic Metabolic Panel; Future    2. Hypothyroidism, unspecified type  Continue current. Labs 3 months    - T4, Free; Future  - TSH; Future    3. Hx of mitral valve replacement with mechanical valve [Z95.2]  Followed closely by coumadin clinic and cardiology.     Follow up: Return in about 3 months (around 11/11/2017) for Follow up Diabetes with labs few days before.          This document has been electronically signed by Casandra Pedersen DO on August 11, 2017 4:30 PM

## 2017-08-16 ENCOUNTER — ANTICOAGULATION VISIT (OUTPATIENT)
Dept: CARDIOLOGY | Facility: CLINIC | Age: 37
End: 2017-08-16

## 2017-08-16 ENCOUNTER — OFFICE VISIT (OUTPATIENT)
Dept: CARDIOLOGY | Facility: CLINIC | Age: 37
End: 2017-08-16

## 2017-08-16 VITALS
BODY MASS INDEX: 32.62 KG/M2 | DIASTOLIC BLOOD PRESSURE: 73 MMHG | HEART RATE: 65 BPM | HEIGHT: 66 IN | SYSTOLIC BLOOD PRESSURE: 118 MMHG | WEIGHT: 203 LBS

## 2017-08-16 DIAGNOSIS — Z79.4 TYPE 2 DIABETES MELLITUS WITHOUT COMPLICATION, WITH LONG-TERM CURRENT USE OF INSULIN (HCC): ICD-10-CM

## 2017-08-16 DIAGNOSIS — D68.9 COUMADIN RESISTANCE (HCC): Primary | ICD-10-CM

## 2017-08-16 DIAGNOSIS — Z95.2 HX OF MITRAL VALVE REPLACEMENT WITH MECHANICAL VALVE: ICD-10-CM

## 2017-08-16 DIAGNOSIS — E11.9 TYPE 2 DIABETES MELLITUS WITHOUT COMPLICATION, WITH LONG-TERM CURRENT USE OF INSULIN (HCC): ICD-10-CM

## 2017-08-16 DIAGNOSIS — Z79.01 COUMADIN RESISTANCE (HCC): Primary | ICD-10-CM

## 2017-08-16 DIAGNOSIS — I42.1 HYPERTROPHIC OBSTRUCTIVE CARDIOMYOPATHY (HOCM) (HCC): Primary | ICD-10-CM

## 2017-08-16 DIAGNOSIS — E78.2 MIXED HYPERLIPIDEMIA: ICD-10-CM

## 2017-08-16 LAB — INR PPP: 2.1

## 2017-08-16 PROCEDURE — 99213 OFFICE O/P EST LOW 20 MIN: CPT | Performed by: INTERNAL MEDICINE

## 2017-08-16 NOTE — PROGRESS NOTES
[facility] Cardiology  OFFICE NOTE    Frances Motta  37 y.o. female    08/16/2017  1. Hypertrophic obstructive cardiomyopathy (HOCM)    2. Mixed hyperlipidemia    3. Hx of mitral valve replacement with mechanical valve [Z95.2]    4. Type 2 diabetes mellitus without complication, with long-term current use of insulin        Chief complaint -Follow-up after she recently had right hemiparesis from possibly thromboembolism from the mechanical mitral valve from subtherapeutic INR      History of present Illness- 37-year-old lady who had hypertrophic cardiomyopathy and had myomectomy and mechanical mitral valve replacement.  She has been having difficulty to get her INR about 2.5.  And she recently had a possible embolic stroke with right hemiparesis.  She was seen at [facility] [place] and had a CABRERA and there wasThrombus on the mitral valve and was given anticoagulation and she'll need a repeat CABRERA.  I asked her to get it done at [facility] in [place] as they can compare the old CABRERA when she had the thrombus in the prosthetic mitral valve.  She also needs to see a neurosurgeon as she was diagnosed with a possible aneurysm in the brain.  I asked her to see the neurologist who saw her to refer her to see neurosurgeon with all the records necessary as it was done at [facility] in [place].  I again reemphasized the need to keep her INR therapeutic.              Allergies   Allergen Reactions   • Lortab [Hydrocodone-Acetaminophen]          Past Medical History:   Diagnosis Date   • AICD (automatic cardioverter/defibrillator) present    • Anticoagulation goal of INR 2.5 to 3.5    • Asthma    • Cardiomyopathy    • Congestive heart failure    • Diabetes mellitus    • Disease of thyroid gland     hypothyroidism    • Enlarged heart    • Hx of mitral valve replacement with mechanical valve     with thrombus 7/2017   • Migraine    • TIA (transient ischemic  attack)          Past Surgical History:   Procedure Laterality Date   • A-V CARDIAC PACEMAKER INSERTION     • APPENDECTOMY     • ATRIAL CARDIAC PACEMAKER INSERTION     • CARDIAC CATHETERIZATION     • CARDIAC SURGERY     • CARDIAC VALVE SURGERY     • CYSTOSCOPY  09/23/2015   • HYSTERECTOMY     • TONSILLECTOMY     • TRANSESOPHAGEAL ECHOCARDIOGRAM (CABRERA)           Family History   Problem Relation Age of Onset   • Diabetes Father    • Hypertension Father    • Diabetes Maternal Grandmother    • Kidney disease Maternal Grandmother    • Hypertension Maternal Grandmother    • Heart disease Maternal Grandmother    • Stroke Maternal Grandmother    • Thyroid disease Maternal Grandmother    • Cancer Maternal Grandfather    • Kidney disease Paternal Grandmother    • Diabetes Paternal Grandfather          Social History     Social History   • Marital status:      Spouse name: N/A   • Number of children: N/A   • Years of education: N/A     Occupational History   • Not on file.     Social History Main Topics   • Smoking status: Former Smoker   • Smokeless tobacco: Never Used   • Alcohol use No   • Drug use: No   • Sexual activity: Defer     Other Topics Concern   • Not on file     Social History Narrative         Current Outpatient Prescriptions   Medication Sig Dispense Refill   • atorvastatin (LIPITOR) 20 MG tablet Take 1 tablet by mouth Daily. 90 tablet 3   • dronedarone (MULTAQ) 400 MG tablet Take 400 mg by mouth 2 (Two) Times a Day With Meals.     • fenofibrate 160 MG tablet Take 1 tablet by mouth Daily. 90 tablet 3   • furosemide (LASIX) 40 MG tablet Take 1 tablet by mouth Daily. 90 tablet 3   • Insulin Glargine (LANTUS SOLOSTAR) 100 UNIT/ML injection pen Inject 15 Units under the skin Every Night. 5 pen 3   • levothyroxine (SYNTHROID, LEVOTHROID) 50 MCG tablet Take 1 tablet by mouth Daily. 90 tablet 3   • metFORMIN (GLUCOPHAGE) 1000 MG tablet Take 1 tablet by mouth 2 (Two) Times a Day. 60 tablet 6   • metoprolol  "tartrate (LOPRESSOR) 25 MG tablet Take 12.5 mg by mouth 2 (Two) Times a Day.     • pantoprazole (PROTONIX) 40 MG EC tablet Take 1 tablet by mouth Daily. 90 tablet 3   • warfarin (COUMADIN) 5 MG tablet Take 1 tablet by mouth Dose Adjusted By Provider As Needed. 2 po Monday through Thursday Friday sat sun 3 tabs 180 tablet 6     No current facility-administered medications for this visit.          Review of Systems     Constitution: Denies any fatigue, fever or chills    HENT: Denies any headache, hearing impairment,     Eyes: Denies any blurring of vision, or photophobia     Cardivascular - Obstructive hypertrophic cardiomyopathy status post myomectomy and ICD placement    Respiratory system-denies any COPD, shortness of breath,   sleep apnea.     Endocrine:  Insulin-dependent diabetes and hyperlipidemia       Musculoskeletal:  No history of arthritis with musculoskeletal problems    Gastrointestinal: No nausea, vomiting, or melena    Genitourinary: No dysuria or hematuria    Neurological:   Recent stroke with right hemiparesis due to embolic stroke from thrombus from the prosthetic mitral valve    Psychiatric/Behavioral:        No history of depression,  No history of bipolar disorder or schizophrenia     Hematological- no history of easy bruising or any bleeding diathesis            OBJECTIVE    /73  Pulse 65  Ht 66\" (167.6 cm)  Wt 203 lb (92.1 kg)  LMP 04/30/2015 (Within Months)  BMI 32.77 kg/m2      Physical Exam     Constitutional: is oriented to person, place, and time.     Skin-warm and dry    Well developed and nourished in no acute distress      Head: Normocephalic and atraumatic.     Eyes: Pupils are equal, round, and reactive to light.     Neck: Neck supple. No bruit in the carotids, no elevation of JVD    Cardiovascular: [place] in the fifth intercostal space   Regular rate, and  Rhythm,    S1 greater than S2, prosthetic valve sound heard well    Pulmonary/Chest:   Air  Entry is equal on both " sides  No wheezing or crackles,      Abdominal: Soft.  No hepatosplenomegaly, bowel sounds are present    Musculoskeletal: No kyphoscoliosis,     Neurological: is alert and oriented to person, place, and time.    cranial nerve are intact .   Normal motor power with right sensory neuropathy    Extremities-no edema, no radial femoral delay      Psychiatric: He has a normal mood and affect.                  His behavior is normal.           Procedures      Lab Results   Component Value Date    WBC 10.52 (H) 04/18/2017    HGB 12.7 04/18/2017    HCT 37.7 04/18/2017    MCV 80.7 04/18/2017     04/18/2017     Lab Results   Component Value Date    GLUCOSE 124 (H) 04/18/2017    BUN 15 04/18/2017    CREATININE 0.76 04/18/2017    EGFRIFNONA 85 04/18/2017    BCR 19.7 04/18/2017    CO2 25.0 04/18/2017    CALCIUM 9.4 04/18/2017    ALBUMIN 4.10 04/18/2017    LABIL2 1.4 04/18/2017    AST 52 (H) 04/18/2017    ALT 39 04/18/2017     Lab Results   Component Value Date    CHOL 174 05/24/2017     Lab Results   Component Value Date    TRIG 170 05/24/2017    TRIG 292 (H) 11/17/2016    TRIG 264 (H) 08/12/2016     Lab Results   Component Value Date    HDL 31 (L) 05/24/2017    HDL 40 (L) 11/17/2016    HDL 37 (L) 12/01/2015     Lab Results   Component Value Date    LDLCALC 108 11/17/2016    LDLCALC 102 08/12/2016    LDLCALC 111 12/01/2015     Lab Results   Component Value Date    LDL 81 07/03/2015    LDL Unable to calculate due to elevated Triglycerides. 02/09/2015     No results found for: HDLLDLRATIO  No components found for: CHOLHDL  Lab Results   Component Value Date    HGBA1C 7.1 (H) 08/09/2017     Lab Results   Component Value Date    TSH 1.540 08/09/2017                  A/P    Hypertrophic obstructive cardiomyopathy- status post myomectomy, ICD placement and mechanical mitral valve replacement.  Her INR is difficult to control and many times with subtherapeutic.  Recently at [facility] with the embolic stroke from  prosthetic valve.   about compliance with diet and keeping the INR about 2.5.    Questionable aneurysm in the brain by MRI from [place] asked her to talk to the neurologist Dr. Enriquez's office to refer her to neurosurgery.    Hypothyroidism on Synthroid supplements.    Mixed hyperlipidemia on atorvastatin and fenofibrate.    Follow-up in 3 months or earlier if she needs to CABRERA, unless it is done at [facility].            This document has been electronically signed by Wallace Haro MD on August 16, 2017 2:13 PM       EMR Dragon/Transcription disclaimer:   Some of this note may be an electronic transcription/translation of spoken language to printed text. The electronic translation of spoken language may permit erroneous, or at times, nonsensical words or phrases to be inadvertently transcribed; Although I have reviewed the note for such errors, some may still exist.

## 2017-08-17 DIAGNOSIS — I74.9 EMBOLIC INFARCTION (HCC): Primary | ICD-10-CM

## 2017-08-30 ENCOUNTER — ANTICOAGULATION VISIT (OUTPATIENT)
Dept: CARDIOLOGY | Facility: CLINIC | Age: 37
End: 2017-08-30

## 2017-08-30 DIAGNOSIS — Z95.2 HX OF MITRAL VALVE REPLACEMENT WITH MECHANICAL VALVE: ICD-10-CM

## 2017-08-30 LAB — INR PPP: 2.8

## 2017-09-01 ENCOUNTER — RESULTS ENCOUNTER (OUTPATIENT)
Dept: CARDIOLOGY | Facility: CLINIC | Age: 37
End: 2017-09-01

## 2017-09-01 DIAGNOSIS — Z95.2 MECHANICAL HEART VALVE PRESENT: ICD-10-CM

## 2017-09-12 ENCOUNTER — CONSULT (OUTPATIENT)
Dept: ONCOLOGY | Facility: CLINIC | Age: 37
End: 2017-09-12

## 2017-09-12 ENCOUNTER — APPOINTMENT (OUTPATIENT)
Dept: ONCOLOGY | Facility: HOSPITAL | Age: 37
End: 2017-09-12

## 2017-09-12 VITALS
WEIGHT: 205.1 LBS | BODY MASS INDEX: 32.96 KG/M2 | TEMPERATURE: 97.4 F | HEIGHT: 66 IN | RESPIRATION RATE: 16 BRPM | DIASTOLIC BLOOD PRESSURE: 68 MMHG | SYSTOLIC BLOOD PRESSURE: 117 MMHG | HEART RATE: 59 BPM

## 2017-09-12 DIAGNOSIS — Z95.2 HX OF MITRAL VALVE REPLACEMENT WITH MECHANICAL VALVE: Primary | ICD-10-CM

## 2017-09-12 DIAGNOSIS — Z79.01 ANTICOAGULATED ON COUMADIN: ICD-10-CM

## 2017-09-12 PROCEDURE — 99204 OFFICE O/P NEW MOD 45 MIN: CPT | Performed by: INTERNAL MEDICINE

## 2017-09-12 PROCEDURE — G0463 HOSPITAL OUTPT CLINIC VISIT: HCPCS | Performed by: INTERNAL MEDICINE

## 2017-09-12 NOTE — PROGRESS NOTES
DATE OF CONSULT: 9/12/2017    REQUESTING SOURCE:  Dr Gonsalez       REASON FOR CONSULTATION:  Problem getting therapeutic range on Coumadin      HISTORY OF PRESENT ILLNESS:    37-year-old female with a past medical history significant for open heart surgery with mitral valve replacement with mechanical valve in July 2015, history of AICD placement of week later after open-heart surgery secondary to tachycardia, congestive heart failure has been on Coumadin since July 2015 secondary to mechanical mitral valve.  In last few months patient is having problems keeping her INR in her therapeutic range.  Patient was admitted to [facility] in [place] in July 2017 with right-sided hemiparesis and possible thromboembolic event with subtherapeutic INR.  CABRERA done on that visit showed possible thrombus and mitral valve.  Patient has been referred to hematology clinic for recommendations regarding her Coumadin.  Patient denies any bleeding at present.  Last INR checked 2 weeks ago was therapeutic at 2.8.  Denies any major change in medications recently.  Denies any abnormal swollen and anywhere in the body.    PAST MEDICAL HISTORY:    Past Medical History:   Diagnosis Date   • AICD (automatic cardioverter/defibrillator) present    • Anticoagulation goal of INR 2.5 to 3.5    • Asthma    • Cardiomyopathy    • Congestive heart failure    • Diabetes mellitus    • Disease of thyroid gland     hypothyroidism    • Enlarged heart    • Hx of mitral valve replacement with mechanical valve     with thrombus 7/2017   • Migraine    • TIA (transient ischemic attack)        PAST SURGICAL HISTORY:  Past Surgical History:   Procedure Laterality Date   • A-V CARDIAC PACEMAKER INSERTION     • APPENDECTOMY     • ATRIAL CARDIAC PACEMAKER INSERTION     • CARDIAC CATHETERIZATION     • CARDIAC SURGERY     • CARDIAC VALVE SURGERY     • CYSTOSCOPY  09/23/2015   • HYSTERECTOMY     • TONSILLECTOMY     • TRANSESOPHAGEAL ECHOCARDIOGRAM (CABRERA)          ALLERGIES:    Allergies   Allergen Reactions   • Lortab [Hydrocodone-Acetaminophen]        SOCIAL HISTORY:   Social History   Substance Use Topics   • Smoking status: Former Smoker   • Smokeless tobacco: Never Used   • Alcohol use No       CURRENT MEDICATIONS:    Current Outpatient Prescriptions   Medication Sig Dispense Refill   • atorvastatin (LIPITOR) 20 MG tablet Take 1 tablet by mouth Daily. 90 tablet 3   • dronedarone (MULTAQ) 400 MG tablet Take 1 tablet by mouth 2 (Two) Times a Day With Meals. 180 tablet 3   • fenofibrate 160 MG tablet Take 1 tablet by mouth Daily. 90 tablet 3   • furosemide (LASIX) 40 MG tablet Take 1 tablet by mouth Daily. 90 tablet 3   • Insulin Glargine (LANTUS SOLOSTAR) 100 UNIT/ML injection pen Inject 15 Units under the skin Every Night. 5 pen 3   • levothyroxine (SYNTHROID, LEVOTHROID) 50 MCG tablet Take 1 tablet by mouth Daily. 90 tablet 3   • metFORMIN (GLUCOPHAGE) 1000 MG tablet Take 1 tablet by mouth 2 (Two) Times a Day. 60 tablet 6   • metoprolol tartrate (LOPRESSOR) 25 MG tablet Take 0.5 tablets by mouth 2 (Two) Times a Day. 90 tablet 3   • pantoprazole (PROTONIX) 40 MG EC tablet Take 1 tablet by mouth Daily. 90 tablet 3   • warfarin (COUMADIN) 5 MG tablet Take 1 tablet by mouth Dose Adjusted By Provider As Needed. 2 po Monday through Thursday Friday sat sun 3 tabs 180 tablet 6     No current facility-administered medications for this visit.         HOME MEDICATIONS:   Current Outpatient Prescriptions on File Prior to Visit   Medication Sig Dispense Refill   • atorvastatin (LIPITOR) 20 MG tablet Take 1 tablet by mouth Daily. 90 tablet 3   • dronedarone (MULTAQ) 400 MG tablet Take 1 tablet by mouth 2 (Two) Times a Day With Meals. 180 tablet 3   • fenofibrate 160 MG tablet Take 1 tablet by mouth Daily. 90 tablet 3   • furosemide (LASIX) 40 MG tablet Take 1 tablet by mouth Daily. 90 tablet 3   • Insulin Glargine (LANTUS SOLOSTAR) 100 UNIT/ML injection pen Inject 15 Units under  the skin Every Night. 5 pen 3   • levothyroxine (SYNTHROID, LEVOTHROID) 50 MCG tablet Take 1 tablet by mouth Daily. 90 tablet 3   • metFORMIN (GLUCOPHAGE) 1000 MG tablet Take 1 tablet by mouth 2 (Two) Times a Day. 60 tablet 6   • metoprolol tartrate (LOPRESSOR) 25 MG tablet Take 0.5 tablets by mouth 2 (Two) Times a Day. 90 tablet 3   • pantoprazole (PROTONIX) 40 MG EC tablet Take 1 tablet by mouth Daily. 90 tablet 3   • warfarin (COUMADIN) 5 MG tablet Take 1 tablet by mouth Dose Adjusted By Provider As Needed. 2 po Monday through Thursday Friday sat sun 3 tabs 180 tablet 6     No current facility-administered medications on file prior to visit.        FAMILY HISTORY:    Family History   Problem Relation Age of Onset   • Diabetes Father    • Hypertension Father    • Diabetes Maternal Grandmother    • Kidney disease Maternal Grandmother    • Hypertension Maternal Grandmother    • Heart disease Maternal Grandmother    • Stroke Maternal Grandmother    • Thyroid disease Maternal Grandmother    • Cancer Maternal Grandfather    • Kidney disease Paternal Grandmother    • Diabetes Paternal Grandfather        REVIEW OF SYSTEMS:      CONSTITUTIONAL:  Complains of fatigue. Denies any fever, chills or weight loss.     HEENT:  No epistaxis, mouth sores or difficulty swallowing.    RESPIRATORY:  No new shortness of breath. No new cough or hemoptysis.    CARDIOVASCULAR:  Complains of chest pain with exertion.  No palpitation.    GASTROINTESTINAL:  No abdominal pain nausea, vomiting or blood in the stool.    GENITOURINARY: Complains of intermittent perineal infection requiring drainage.  No Dysuria or Hematuria.    MUSCULOSKELETAL:  No new back pain or arthralgia..    LYMPHATICS:  Denies any abnormal swollen glands anywhere in the body.    NEUROLOGICAL : Complains of tingling and numbness affecting the right thigh since episode of hemiparesis in July 2017.  Complains of intermittent headache.  No dizziness.   No seizures or balance  problems.    SKIN: Complains of intermittent skin infection in perineal area requiring drainage.        PHYSICAL EXAMINATION:      VITAL SIGNS:  Temp:  [97.4 °F (36.3 °C)] 97.4 °F (36.3 °C)  Heart Rate:  [59] 59  Resp:  [16] 16  BP: (117)/(68) 117/68    GENERAL:  Not in any distress.    HEENT:  Normocephalic, Atraumatic.Eyes  Shows mild pallor. No icterus. Extraocular Movements Intact. No Facial Asymmetry noted.    NECK:  No adenopathy. NO JVD.    RESPIRATORY:  Fair air entry bilateral. No rhonchi or wheezing.    CARDIOVASCULAR:  S1, S2. Regular rate and rhythm.  Prosthetic valve sound heard.    ABDOMEN:  Soft, obese, nontender. Bowel sounds present in all four quadrants.  No organomegaly appreciated.    EXTREMITIES:  No edema.No Calf Tenderness.    NEUROLOGIC:  Alert, awake and oriented ×3.  No  Motor  deficit appreciated. Cranial Nerves 2-12 grossly intact.        DIAGNOSTIC DATA:    WBC   Date Value Ref Range Status   04/18/2017 10.52 (H) 3.20 - 9.80 10*3/mm3 Final     RBC   Date Value Ref Range Status   04/18/2017 4.67 3.77 - 5.16 10*6/mm3 Final     Hemoglobin   Date Value Ref Range Status   04/18/2017 12.7 12.0 - 15.5 g/dL Final     Hematocrit   Date Value Ref Range Status   04/18/2017 37.7 35.0 - 45.0 % Final     MCV   Date Value Ref Range Status   04/18/2017 80.7 80.0 - 98.0 fL Final     MCH   Date Value Ref Range Status   04/18/2017 27.2 26.5 - 34.0 pg Final     MCHC   Date Value Ref Range Status   04/18/2017 33.7 31.4 - 36.0 g/dL Final     RDW   Date Value Ref Range Status   04/18/2017 13.4 11.5 - 14.5 % Final     RDW-SD   Date Value Ref Range Status   04/18/2017 39.5 36.4 - 46.3 fl Final     MPV   Date Value Ref Range Status   04/18/2017 8.7 8.0 - 12.0 fL Final     Platelets   Date Value Ref Range Status   04/18/2017 367 150 - 450 10*3/mm3 Final     Neutrophil %   Date Value Ref Range Status   04/18/2017 74.7 37.0 - 80.0 % Final     Lymphocyte %   Date Value Ref Range Status   04/18/2017 18.6 10.0 - 50.0 %  Final     Monocyte %   Date Value Ref Range Status   04/18/2017 6.1 0.0 - 12.0 % Final     Eosinophil %   Date Value Ref Range Status   04/18/2017 0.2 0.0 - 7.0 % Final     Basophil %   Date Value Ref Range Status   04/18/2017 0.2 0.0 - 2.0 % Final     Immature Grans %   Date Value Ref Range Status   04/18/2017 0.2 0.0 - 0.5 % Final     Neutrophils, Absolute   Date Value Ref Range Status   04/18/2017 7.86 2.00 - 8.60 10*3/mm3 Final     Lymphocytes, Absolute   Date Value Ref Range Status   04/18/2017 1.96 0.60 - 4.20 10*3/mm3 Final     Monocytes, Absolute   Date Value Ref Range Status   04/18/2017 0.64 0.00 - 0.90 10*3/mm3 Final     Eosinophils, Absolute   Date Value Ref Range Status   04/18/2017 0.02 0.00 - 0.70 10*3/mm3 Final     Basophils, Absolute   Date Value Ref Range Status   04/18/2017 0.02 0.00 - 0.20 10*3/mm3 Final     Immature Grans, Absolute   Date Value Ref Range Status   04/18/2017 0.02 0.00 - 0.02 10*3/mm3 Final     nRBC   Date Value Ref Range Status   10/31/2016 0.0 0.0 - 0.2 % Final   10/31/2016 0.000 x1000/uL Final     Glucose   Date Value Ref Range Status   04/18/2017 124 (H) 60 - 100 mg/dL Final     Sodium   Date Value Ref Range Status   04/18/2017 139 137 - 145 mmol/L Final     Potassium   Date Value Ref Range Status   04/18/2017 4.1 3.5 - 5.1 mmol/L Final     CO2   Date Value Ref Range Status   04/18/2017 25.0 22.0 - 31.0 mmol/L Final     Chloride   Date Value Ref Range Status   04/18/2017 101 95 - 110 mmol/L Final     Anion Gap   Date Value Ref Range Status   04/18/2017 13.0 5.0 - 15.0 mmol/L Final     Creatinine   Date Value Ref Range Status   04/18/2017 0.76 0.50 - 1.00 mg/dL Final     BUN   Date Value Ref Range Status   04/18/2017 15 7 - 21 mg/dL Final     BUN/Creatinine Ratio   Date Value Ref Range Status   04/18/2017 19.7 7.0 - 25.0 Final     Calcium   Date Value Ref Range Status   04/18/2017 9.4 8.4 - 10.2 mg/dL Final     eGFR Non  Amer   Date Value Ref Range Status   04/18/2017 85  >60 mL/min/1.73 Final     Alkaline Phosphatase   Date Value Ref Range Status   04/18/2017 50 38 - 126 U/L Final     Total Protein   Date Value Ref Range Status   04/18/2017 7.0 6.3 - 8.6 g/dL Final     ALT (SGPT)   Date Value Ref Range Status   04/18/2017 39 9 - 52 U/L Final     AST (SGOT)   Date Value Ref Range Status   04/18/2017 52 (H) 14 - 36 U/L Final     Total Bilirubin   Date Value Ref Range Status   04/18/2017 0.4 0.2 - 1.3 mg/dL Final     Albumin   Date Value Ref Range Status   04/18/2017 4.10 3.40 - 4.80 g/dL Final     Globulin   Date Value Ref Range Status   04/18/2017 2.9 2.3 - 3.5 gm/dL Final     A/G Ratio   Date Value Ref Range Status   04/18/2017 1.4 1.1 - 1.8 g/dL Final     Lab Results   Component Value Date    IRON 27 (L) 12/01/2015    TIBC 416 12/01/2015    LABIRON 6.5 (L) 12/01/2015         ASSESSMENT AND PLAN:      1.  Problems with getting therapeutic INR range with Coumadin: Patient states she has been Coumadin since July 2015 secondary to her mechanical mitral valve.  In between her INR was subtherapeutic requiring frequent change in the dosing of Coumadin.  Currently she is taking Coumadin 5 mg on Monday.  Rest of the week she takes Coumadin 10 mg by mouth daily being followed by Dr. Gonsalez.  Unfortunately in July 2017 she had apparent hemiparesis on the right side and at that point her Coumadin level was subtherapeutic with INR of 1.7, view of mechanical mitral valve new anticoagulants like Xarelto is not an option for her.  Importance of having persistent diet was discussed with patient.  It was discussed with patient most likely cause for fluctuation in the INR would be changing diet frequently or new medications.  In rare cases vitamin K deficiency can also contribute to fluctuating levels of INR.  At this point recommendation is to have close monitoring of INR at least twice a week to begin with until we get therapeutic range on few occasions after that we can start spacing appointment  out for INR check.  Patient was instructed to have consistent patent off diet and avoid any kind of fluctuation and that.  We will see her back in about 3 months with a repeat CBC and CMP on that day.  Patient was instructed to call us sooner if she persistent to have problems with getting INR therapeutic.  Case was discussed with Dr. Gonsalez over the phone, he recommends patient follow-up with Coumadin clinic for close monitoring.  Referral has been placed for Coumadin clinic.    2.  Recent hemiparesis on the right side: Clinically patient does not have any deficit at this point.  Recommend following with the neurology at this point.    3.  Intracranial aneurysm: Patient was found to have aneurysm which was incidental finding.  He she has an appointment with neurosurgeon next week.  She was encouraged to keep that appointment.    4.  Health maintenance: Patient does not smoke currently.  She is only 37 years of age and not due for colonoscopy at this point.  Remains full code.        Thank you for this consultation.        Remington Welch MD  9/12/2017  1:24 PM          EMR Dragon/Transcription disclaimer:   Much of this encounter note is an electronic transcription/translation of spoken language to printed text. The electronic translation of spoken language may permit erroneous, or at times, nonsensical words or phrases to be inadvertently transcribed; Although I have reviewed the note for such errors, some may still exist.

## 2017-09-13 ENCOUNTER — ANTICOAGULATION VISIT (OUTPATIENT)
Dept: CARDIOLOGY | Facility: CLINIC | Age: 37
End: 2017-09-13

## 2017-09-13 DIAGNOSIS — Z95.2 HX OF MITRAL VALVE REPLACEMENT WITH MECHANICAL VALVE: ICD-10-CM

## 2017-09-13 LAB — INR PPP: 3.5

## 2017-09-14 ENCOUNTER — ANTICOAGULATION VISIT (OUTPATIENT)
Dept: CARDIAC SURGERY | Facility: CLINIC | Age: 37
End: 2017-09-14

## 2017-09-14 VITALS — HEART RATE: 98 BPM | SYSTOLIC BLOOD PRESSURE: 139 MMHG | DIASTOLIC BLOOD PRESSURE: 85 MMHG

## 2017-09-14 DIAGNOSIS — Z79.01 ANTICOAGULATED ON COUMADIN: ICD-10-CM

## 2017-09-14 DIAGNOSIS — Z95.2 HX OF MITRAL VALVE REPLACEMENT WITH MECHANICAL VALVE: ICD-10-CM

## 2017-09-14 LAB — INR PPP: 3.9 (ref 0.9–1.1)

## 2017-09-14 PROCEDURE — 85610 PROTHROMBIN TIME: CPT | Performed by: NURSE PRACTITIONER

## 2017-09-14 PROCEDURE — 99211 OFF/OP EST MAY X REQ PHY/QHP: CPT | Performed by: NURSE PRACTITIONER

## 2017-09-14 NOTE — PROGRESS NOTES
PT here today for enrollment in Coumadin Clinic. PT has been recently managed per Dr Gonsalez but at Dr Welch's request, pt is now enrolled in Coumadin Clinic. PT states her INR has fluctuated since starting Coumadin. PT was in hospital in July for possible TIA episode. During that time pt was told she had clot on mechanical valve. PT was given Coumadin Clinic booklet and key points reviewed. Discussed with pt reason for Coumadin therapy, to inform all providers about Coumadin therapy, safety including seek CT scan for head/blunt trauma, safety with sharp objects and power tools, s/s of bleeding, s/s of blood clot, reasons to call CC/provider on call, to only obtain Coumadin refills from Coumadin Clinic, and to wear medical alert bracelet. Dietary restrictions were discussed in length with pt. Explained to pt about vitamin k's effects on INR and importance of consistency with diet. PT is going to food journal between now and appt on Tuesday. PT can only be seen on Tuesdays and Fridays due to not being able to drive at this time. PT has been taking 5mg on Monday and 10mg all other days for about 1 month. Adjusted pt's dose and instructed to increase vit k today with 1 cup of green vegs. Spent 35 mins of direct education with pt. PT was given oral and written instructions. Pt very receptive to teaching. PT will be seen on Tuesday when she can return. Patient instructed regarding medication; results given and questions answered. Nutritional counseling given.  Dietary factors affecting therapy addressed.  Patient instructed to monitor for excessive bruising or bleeding.          This document has been electronically signed by JR Knox on September 14, 2017 3:51 PM

## 2017-09-19 ENCOUNTER — ANTICOAGULATION VISIT (OUTPATIENT)
Dept: CARDIAC SURGERY | Facility: CLINIC | Age: 37
End: 2017-09-19

## 2017-09-19 VITALS — HEART RATE: 62 BPM | DIASTOLIC BLOOD PRESSURE: 77 MMHG | SYSTOLIC BLOOD PRESSURE: 131 MMHG

## 2017-09-19 DIAGNOSIS — Z79.01 ANTICOAGULATED ON COUMADIN: ICD-10-CM

## 2017-09-19 LAB — INR PPP: 2.7 (ref 0.9–1.1)

## 2017-09-19 PROCEDURE — 85610 PROTHROMBIN TIME: CPT | Performed by: NURSE PRACTITIONER

## 2017-09-19 PROCEDURE — 99211 OFF/OP EST MAY X REQ PHY/QHP: CPT | Performed by: NURSE PRACTITIONER

## 2017-09-19 NOTE — PROGRESS NOTES
Pt denies med changes or bleeding problems. Pt brought in food diary and was encouraged to continue tracking food; pt verbalized. Pt had minimal amounts of vitamin K noted to diary other than possibly from marinara sauce on spaghetti and a couple of days she had supreme pizza. Pt was encouraged to use caution with marinara sauce; pt verbalized. Due to drop in INR dose was adjusted and appt made for 3 days. Patient instructed regarding medication; results given and questions answered. Nutritional counseling given.  Dietary factors affecting therapy addressed.  Patient instructed to monitor for excessive bruising or bleeding.   Electronically signed by JR Shearer

## 2017-09-22 ENCOUNTER — ANTICOAGULATION VISIT (OUTPATIENT)
Dept: CARDIAC SURGERY | Facility: CLINIC | Age: 37
End: 2017-09-22

## 2017-09-22 VITALS — HEART RATE: 72 BPM | SYSTOLIC BLOOD PRESSURE: 122 MMHG | DIASTOLIC BLOOD PRESSURE: 84 MMHG

## 2017-09-22 DIAGNOSIS — Z79.01 ANTICOAGULATED ON COUMADIN: ICD-10-CM

## 2017-09-22 LAB — INR PPP: 3.3 (ref 0.9–1.1)

## 2017-09-22 PROCEDURE — 85610 PROTHROMBIN TIME: CPT | Performed by: NURSE PRACTITIONER

## 2017-09-22 NOTE — PROGRESS NOTES
Pt denies med changes or bleeding problems. Pt was instructed to have a serving of green veggies today or tomorrow; pt verbalized. Patient instructed regarding medication; results given and questions answered. Nutritional counseling given.  Dietary factors affecting therapy addressed.  Patient instructed to monitor for excessive bruising or bleeding. Will recheck in 4 days.  Electronically signed by JR Shearer

## 2017-09-26 ENCOUNTER — ANTICOAGULATION VISIT (OUTPATIENT)
Dept: CARDIAC SURGERY | Facility: CLINIC | Age: 37
End: 2017-09-26

## 2017-09-26 VITALS — SYSTOLIC BLOOD PRESSURE: 110 MMHG | DIASTOLIC BLOOD PRESSURE: 54 MMHG | HEART RATE: 60 BPM

## 2017-09-26 DIAGNOSIS — Z79.01 ANTICOAGULATED ON COUMADIN: ICD-10-CM

## 2017-09-26 LAB — INR PPP: 2.9 (ref 0.9–1.1)

## 2017-09-26 PROCEDURE — 85610 PROTHROMBIN TIME: CPT | Performed by: NURSE PRACTITIONER

## 2017-09-29 ENCOUNTER — RESULTS ENCOUNTER (OUTPATIENT)
Dept: CARDIOLOGY | Facility: CLINIC | Age: 37
End: 2017-09-29

## 2017-09-29 ENCOUNTER — ANTICOAGULATION VISIT (OUTPATIENT)
Dept: CARDIAC SURGERY | Facility: CLINIC | Age: 37
End: 2017-09-29

## 2017-09-29 VITALS — SYSTOLIC BLOOD PRESSURE: 114 MMHG | HEART RATE: 61 BPM | DIASTOLIC BLOOD PRESSURE: 66 MMHG

## 2017-09-29 DIAGNOSIS — Z79.01 ANTICOAGULATED ON COUMADIN: ICD-10-CM

## 2017-09-29 DIAGNOSIS — Z95.2 MECHANICAL HEART VALVE PRESENT: ICD-10-CM

## 2017-09-29 LAB — INR PPP: 3.9 (ref 0.9–1.1)

## 2017-09-29 PROCEDURE — 85610 PROTHROMBIN TIME: CPT | Performed by: NURSE PRACTITIONER

## 2017-09-29 PROCEDURE — 99211 OFF/OP EST MAY X REQ PHY/QHP: CPT | Performed by: NURSE PRACTITIONER

## 2017-09-29 NOTE — PROGRESS NOTES
Pt denies med changes or bleeding problems. Pt brought food diary and avoided green veggies as instructed. Dose rearranged due to rapid rise in INR and pt instructed to have a salad or a 1/2 cup of broccoli today; pt verbalized. Patient instructed regarding medication; results given and questions answered. Nutritional counseling given.  Dietary factors affecting therapy addressed.  Patient instructed to monitor for excessive bruising or bleeding. Will recheck in 3 days.          This document has been electronically signed by JR Knox on September 29, 2017 3:31 PM

## 2017-10-02 ENCOUNTER — ANTICOAGULATION VISIT (OUTPATIENT)
Dept: CARDIAC SURGERY | Facility: CLINIC | Age: 37
End: 2017-10-02

## 2017-10-02 VITALS — HEART RATE: 61 BPM | DIASTOLIC BLOOD PRESSURE: 85 MMHG | SYSTOLIC BLOOD PRESSURE: 127 MMHG

## 2017-10-02 DIAGNOSIS — Z79.01 ANTICOAGULATED ON COUMADIN: ICD-10-CM

## 2017-10-02 LAB — INR PPP: 3.3 (ref 0.9–1.1)

## 2017-10-02 PROCEDURE — 85610 PROTHROMBIN TIME: CPT | Performed by: NURSE PRACTITIONER

## 2017-10-02 NOTE — PROGRESS NOTES
PT states she increase vit k as directed. Denies any new medications or bleeding issues. Denies any s/s of blood clot. PT instructed on dosing and will consume green on Wed. PT will be seen on Thursday. Patient instructed regarding medication; results given and questions answered. Nutritional counseling given.  Dietary factors affecting therapy addressed.  Patient instructed to monitor for excessive bruising or bleeding. PT verbalizes understanding.   Electronically signed by JR Shearer

## 2017-10-05 ENCOUNTER — ANTICOAGULATION VISIT (OUTPATIENT)
Dept: CARDIAC SURGERY | Facility: CLINIC | Age: 37
End: 2017-10-05

## 2017-10-05 DIAGNOSIS — Z79.01 ANTICOAGULATED ON COUMADIN: ICD-10-CM

## 2017-10-05 LAB — INR PPP: 4.1 (ref 0.9–1.1)

## 2017-10-05 PROCEDURE — 85610 PROTHROMBIN TIME: CPT | Performed by: NURSE PRACTITIONER

## 2017-10-05 PROCEDURE — 99211 OFF/OP EST MAY X REQ PHY/QHP: CPT | Performed by: NURSE PRACTITIONER

## 2017-10-05 NOTE — PROGRESS NOTES
Pt denies med changes or bleeding problems. Pt brought in food diary and had 1/2 cup serving of broccoli yesterday. Dose adjusted and pt instructed to have a serving of green veggies today; pt verbalized. Patient instructed regarding medication; results given and questions answered. Nutritional counseling given.  Dietary factors affecting therapy addressed.  Patient instructed to monitor for excessive bruising or bleeding. Will recheck in 5 days.  Electronically signed by JR Shearer

## 2017-10-10 ENCOUNTER — ANTICOAGULATION VISIT (OUTPATIENT)
Dept: CARDIAC SURGERY | Facility: CLINIC | Age: 37
End: 2017-10-10

## 2017-10-10 DIAGNOSIS — Z79.01 ANTICOAGULATED ON COUMADIN: ICD-10-CM

## 2017-10-10 LAB — INR PPP: 3.7 (ref 0.9–1.1)

## 2017-10-10 PROCEDURE — 85610 PROTHROMBIN TIME: CPT | Performed by: NURSE PRACTITIONER

## 2017-10-10 PROCEDURE — 99211 OFF/OP EST MAY X REQ PHY/QHP: CPT | Performed by: NURSE PRACTITIONER

## 2017-10-10 NOTE — PROGRESS NOTES
Pt states she has been back on Aspirin for about 2 weeks. Pt denies bleeding problems. Pt was instructed to always notify CC of med changes; pt verbalized. Dose slightly adjusted and pt instructed to have a serving of green veggies today; pt verbalized. Patient instructed regarding medication; results given and questions answered. Nutritional counseling given.  Dietary factors affecting therapy addressed.  Patient instructed to monitor for excessive bruising or bleeding. Will recheck in 3 days.  Electronically signed by JR Shearer

## 2017-10-13 ENCOUNTER — ANTICOAGULATION VISIT (OUTPATIENT)
Dept: CARDIAC SURGERY | Facility: CLINIC | Age: 37
End: 2017-10-13

## 2017-10-13 VITALS — SYSTOLIC BLOOD PRESSURE: 113 MMHG | HEART RATE: 60 BPM | DIASTOLIC BLOOD PRESSURE: 62 MMHG

## 2017-10-13 DIAGNOSIS — Z79.01 ANTICOAGULATED ON COUMADIN: ICD-10-CM

## 2017-10-13 LAB — INR PPP: 2.7 (ref 0.9–1.1)

## 2017-10-13 PROCEDURE — 85610 PROTHROMBIN TIME: CPT | Performed by: NURSE PRACTITIONER

## 2017-10-13 PROCEDURE — 99211 OFF/OP EST MAY X REQ PHY/QHP: CPT | Performed by: NURSE PRACTITIONER

## 2017-10-13 NOTE — PROGRESS NOTES
Pt denies med changes or bleeding problems. Denies bleeding problems. Dose slightly adjusted due to drop in INR and pt was instructed to have a serving of green veggies Sunday; pt verbalized. Patient instructed regarding medication; results given and questions answered. Nutritional counseling given.  Dietary factors affecting therapy addressed.  Patient instructed to monitor for excessive bruising or bleeding. Will recheck on Tuesday.  Electronically signed by JR Shearer

## 2017-10-17 ENCOUNTER — ANTICOAGULATION VISIT (OUTPATIENT)
Dept: CARDIAC SURGERY | Facility: CLINIC | Age: 37
End: 2017-10-17

## 2017-10-17 VITALS — DIASTOLIC BLOOD PRESSURE: 75 MMHG | SYSTOLIC BLOOD PRESSURE: 119 MMHG | HEART RATE: 60 BPM

## 2017-10-17 DIAGNOSIS — Z79.01 ANTICOAGULATED ON COUMADIN: ICD-10-CM

## 2017-10-17 LAB — INR PPP: 3.6 (ref 0.9–1.1)

## 2017-10-17 PROCEDURE — 99211 OFF/OP EST MAY X REQ PHY/QHP: CPT | Performed by: NURSE PRACTITIONER

## 2017-10-17 PROCEDURE — 85610 PROTHROMBIN TIME: CPT | Performed by: NURSE PRACTITIONER

## 2017-10-17 NOTE — PROGRESS NOTES
Pt states no changes to meds or diet.  No bleeding issues.  Adjusted coumadin dose slightly and instructed pt to increase Vit K intake today with a small salad.  Recheck INR this Friday.  Pt verbalizes.  Patient instructed regarding medication; results given and questions answered. Nutritional counseling given.  Dietary factors affecting therapy addressed.  Patient instructed to monitor for excessive bruising or bleeding.  Electronically signed by JR Shearer

## 2017-10-20 ENCOUNTER — ANTICOAGULATION VISIT (OUTPATIENT)
Dept: CARDIAC SURGERY | Facility: CLINIC | Age: 37
End: 2017-10-20

## 2017-10-20 VITALS — SYSTOLIC BLOOD PRESSURE: 106 MMHG | HEART RATE: 62 BPM | DIASTOLIC BLOOD PRESSURE: 71 MMHG

## 2017-10-20 DIAGNOSIS — Z79.01 ANTICOAGULATED ON COUMADIN: ICD-10-CM

## 2017-10-20 LAB — INR PPP: 3.2 (ref 0.9–1.1)

## 2017-10-20 PROCEDURE — 85610 PROTHROMBIN TIME: CPT | Performed by: NURSE PRACTITIONER

## 2017-10-20 NOTE — PROGRESS NOTES
Pt denies med changes or bleeding problems. Will recheck on Monday on current dose. Pt was instructed to have a serving of green veggies today; pt verbalized. Patient instructed regarding medication; results given and questions answered. Nutritional counseling given.  Dietary factors affecting therapy addressed.  Patient instructed to monitor for excessive bruising or bleeding.    **Pt states she ate 1/2 cup serving of sauerkraut on Tuesday for her green veggie

## 2017-10-23 ENCOUNTER — ANTICOAGULATION VISIT (OUTPATIENT)
Dept: CARDIAC SURGERY | Facility: CLINIC | Age: 37
End: 2017-10-23

## 2017-10-23 VITALS — DIASTOLIC BLOOD PRESSURE: 66 MMHG | SYSTOLIC BLOOD PRESSURE: 120 MMHG | HEART RATE: 60 BPM

## 2017-10-23 DIAGNOSIS — Z79.01 ANTICOAGULATED ON COUMADIN: ICD-10-CM

## 2017-10-23 LAB — INR PPP: 3.6 (ref 0.9–1.1)

## 2017-10-23 PROCEDURE — 99211 OFF/OP EST MAY X REQ PHY/QHP: CPT | Performed by: NURSE PRACTITIONER

## 2017-10-23 PROCEDURE — 85610 PROTHROMBIN TIME: CPT | Performed by: NURSE PRACTITIONER

## 2017-10-23 NOTE — PROGRESS NOTES
Pt states no changes to meds or diet.  No bleeding issues.  Adjusted coumadin dose slightly and will recheck INR this Thursday.  Instructed pt to have a small salad today.  Pt verbalizes.  Patient instructed regarding medication; results given and questions answered. Nutritional counseling given.  Dietary factors affecting therapy addressed.  Patient instructed to monitor for excessive bruising or bleeding.          This document has been electronically signed by JR Knox on October 23, 2017 4:07 PM

## 2017-10-26 ENCOUNTER — ANTICOAGULATION VISIT (OUTPATIENT)
Dept: CARDIAC SURGERY | Facility: CLINIC | Age: 37
End: 2017-10-26

## 2017-10-26 VITALS — SYSTOLIC BLOOD PRESSURE: 123 MMHG | HEART RATE: 64 BPM | DIASTOLIC BLOOD PRESSURE: 86 MMHG

## 2017-10-26 DIAGNOSIS — Z79.01 ANTICOAGULATED ON COUMADIN: ICD-10-CM

## 2017-10-26 LAB — INR PPP: 2.6 (ref 0.9–1.1)

## 2017-10-26 PROCEDURE — 85610 PROTHROMBIN TIME: CPT | Performed by: NURSE PRACTITIONER

## 2017-10-26 NOTE — PROGRESS NOTES
PT brought in food journal and was eating small amounts of k. Denies any new medications or bleeding issues. PT denies any s/s of blood clot. Unwilling to increase dose since pt elevated at slightly higher dose. PT instructed on dosing and to hold green vegs for 2 days. Patient instructed regarding medication; results given and questions answered. Nutritional counseling given.  Dietary factors affecting therapy addressed.  Patient instructed to monitor for excessive bruising or bleeding. PT will be seen on Monday.           This document has been electronically signed by JR Knox on October 26, 2017 4:16 PM

## 2017-10-27 ENCOUNTER — RESULTS ENCOUNTER (OUTPATIENT)
Dept: CARDIOLOGY | Facility: CLINIC | Age: 37
End: 2017-10-27

## 2017-10-27 DIAGNOSIS — Z95.2 MECHANICAL HEART VALVE PRESENT: ICD-10-CM

## 2017-10-30 ENCOUNTER — ANTICOAGULATION VISIT (OUTPATIENT)
Dept: CARDIAC SURGERY | Facility: CLINIC | Age: 37
End: 2017-10-30

## 2017-10-30 VITALS — DIASTOLIC BLOOD PRESSURE: 64 MMHG | HEART RATE: 59 BPM | SYSTOLIC BLOOD PRESSURE: 92 MMHG

## 2017-10-30 DIAGNOSIS — Z79.01 ANTICOAGULATED ON COUMADIN: ICD-10-CM

## 2017-10-30 LAB — INR PPP: 2.2 (ref 0.9–1.1)

## 2017-10-30 PROCEDURE — 99211 OFF/OP EST MAY X REQ PHY/QHP: CPT | Performed by: NURSE PRACTITIONER

## 2017-10-30 PROCEDURE — 85610 PROTHROMBIN TIME: CPT | Performed by: NURSE PRACTITIONER

## 2017-10-30 NOTE — PROGRESS NOTES
Reviewed pt's food journal which showed dietary compliance. Denies any new medications or bleeding issues. PT denies any missed doses or s/s of blood clot. Adjusted pt's dose and instructed to hold green vegs until Wednesday. Patient instructed regarding medication; results given and questions answered. Nutritional counseling given.  Dietary factors affecting therapy addressed.  Patient instructed to monitor for excessive bruising or bleeding. PT verbalizes understanding.           This document has been electronically signed by JR Knox on October 31, 2017 9:50 AM

## 2017-11-02 ENCOUNTER — ANTICOAGULATION VISIT (OUTPATIENT)
Dept: CARDIAC SURGERY | Facility: CLINIC | Age: 37
End: 2017-11-02

## 2017-11-02 VITALS — HEART RATE: 68 BPM | SYSTOLIC BLOOD PRESSURE: 100 MMHG | DIASTOLIC BLOOD PRESSURE: 70 MMHG

## 2017-11-02 DIAGNOSIS — Z79.01 ANTICOAGULATED ON COUMADIN: ICD-10-CM

## 2017-11-02 LAB — INR PPP: 2.1 (ref 0.9–1.1)

## 2017-11-02 NOTE — PROGRESS NOTES
Reviewed pt's food journal without cause. PT denies any missed doses. PT denies any med changes or bleeding issues. PT denies any s/s of blood clot. PT states she is putting Coumadin in pill planner and is taking daily at 4pm. Adjusted pt's dose and instructed to hold green vegs until Sunday. Patient instructed regarding medication; results given and questions answered. Nutritional counseling given.  Dietary factors affecting therapy addressed.  Patient instructed to monitor for excessive bruising or bleeding. PT verbalizes understanding.

## 2017-11-06 ENCOUNTER — ANTICOAGULATION VISIT (OUTPATIENT)
Dept: CARDIAC SURGERY | Facility: CLINIC | Age: 37
End: 2017-11-06

## 2017-11-06 VITALS — SYSTOLIC BLOOD PRESSURE: 107 MMHG | DIASTOLIC BLOOD PRESSURE: 69 MMHG | HEART RATE: 60 BPM

## 2017-11-06 DIAGNOSIS — Z79.01 ANTICOAGULATED ON COUMADIN: ICD-10-CM

## 2017-11-06 LAB — INR PPP: 2 (ref 0.9–1.1)

## 2017-11-06 PROCEDURE — 99211 OFF/OP EST MAY X REQ PHY/QHP: CPT | Performed by: NURSE PRACTITIONER

## 2017-11-06 PROCEDURE — 85610 PROTHROMBIN TIME: CPT | Performed by: NURSE PRACTITIONER

## 2017-11-06 NOTE — PROGRESS NOTES
PT states compliant c tx. PT had okra and small salad 2 days apart over the weekend. Denies any missed doses or excessive k. PT denies any med changes or bleeding issues. PT denies any s/s of blood clot. Adjusted pt's dose and instructed to hold green vegs until being seen on Thursday. Patient instructed regarding medication; results given and questions answered. Nutritional counseling given.  Dietary factors affecting therapy addressed.  Patient instructed to monitor for excessive bruising or bleeding. Pt verbalizes understanding.   Electronically signed by JR Shearer

## 2017-11-09 ENCOUNTER — ANTICOAGULATION VISIT (OUTPATIENT)
Dept: CARDIAC SURGERY | Facility: CLINIC | Age: 37
End: 2017-11-09

## 2017-11-09 VITALS — SYSTOLIC BLOOD PRESSURE: 130 MMHG | HEART RATE: 60 BPM | DIASTOLIC BLOOD PRESSURE: 58 MMHG

## 2017-11-09 DIAGNOSIS — Z79.01 ANTICOAGULATED ON COUMADIN: ICD-10-CM

## 2017-11-09 LAB — INR PPP: 2.6 (ref 0.9–1.1)

## 2017-11-09 PROCEDURE — 85610 PROTHROMBIN TIME: CPT | Performed by: NURSE PRACTITIONER

## 2017-11-09 NOTE — PROGRESS NOTES
PT states compliant c tx. Denies any new medications or bleeding issues. PT denies any s/s of blood clot. Reviewed pt's food journal without issue. PT instructed on dose and to consume small serving of green on Friday or Saturday. Pt will be seen in [facility] on Monday. Patient instructed regarding medication; results given and questions answered. Nutritional counseling given.  Dietary factors affecting therapy addressed.  Patient instructed to monitor for excessive bruising or bleeding. PT verbalizes understanding.   Electronically signed by JR Shearer

## 2017-11-10 ENCOUNTER — LAB (OUTPATIENT)
Dept: LAB | Facility: HOSPITAL | Age: 37
End: 2017-11-10

## 2017-11-10 DIAGNOSIS — E11.9 TYPE 2 DIABETES MELLITUS WITHOUT COMPLICATION, WITHOUT LONG-TERM CURRENT USE OF INSULIN (HCC): ICD-10-CM

## 2017-11-10 DIAGNOSIS — E03.9 HYPOTHYROIDISM, UNSPECIFIED TYPE: ICD-10-CM

## 2017-11-10 LAB
ANION GAP SERPL CALCULATED.3IONS-SCNC: 12 MMOL/L (ref 5–15)
BUN BLD-MCNC: 12 MG/DL (ref 7–21)
BUN/CREAT SERPL: 11.2 (ref 7–25)
CALCIUM SPEC-SCNC: 9.6 MG/DL (ref 8.4–10.2)
CHLORIDE SERPL-SCNC: 101 MMOL/L (ref 95–110)
CO2 SERPL-SCNC: 29 MMOL/L (ref 22–31)
CREAT BLD-MCNC: 1.07 MG/DL (ref 0.5–1)
GFR SERPL CREATININE-BSD FRML MDRD: 57 ML/MIN/1.73 (ref 64–149)
GLUCOSE BLD-MCNC: 112 MG/DL (ref 60–100)
HBA1C MFR BLD: 6.6 % (ref 4–5.6)
POTASSIUM BLD-SCNC: 3.9 MMOL/L (ref 3.5–5.1)
SODIUM BLD-SCNC: 142 MMOL/L (ref 137–145)
T4 FREE SERPL-MCNC: 1.22 NG/DL (ref 0.78–2.19)
TSH SERPL DL<=0.05 MIU/L-ACNC: 3.24 MIU/ML (ref 0.46–4.68)

## 2017-11-10 PROCEDURE — 80048 BASIC METABOLIC PNL TOTAL CA: CPT | Performed by: FAMILY MEDICINE

## 2017-11-10 PROCEDURE — 36415 COLL VENOUS BLD VENIPUNCTURE: CPT

## 2017-11-10 PROCEDURE — 84439 ASSAY OF FREE THYROXINE: CPT | Performed by: FAMILY MEDICINE

## 2017-11-10 PROCEDURE — 83036 HEMOGLOBIN GLYCOSYLATED A1C: CPT | Performed by: FAMILY MEDICINE

## 2017-11-10 PROCEDURE — 84443 ASSAY THYROID STIM HORMONE: CPT | Performed by: FAMILY MEDICINE

## 2017-11-13 ENCOUNTER — OFFICE VISIT (OUTPATIENT)
Dept: FAMILY MEDICINE CLINIC | Facility: CLINIC | Age: 37
End: 2017-11-13

## 2017-11-13 ENCOUNTER — ANTICOAGULATION VISIT (OUTPATIENT)
Dept: CARDIAC SURGERY | Facility: CLINIC | Age: 37
End: 2017-11-13

## 2017-11-13 VITALS — HEART RATE: 56 BPM | DIASTOLIC BLOOD PRESSURE: 74 MMHG | SYSTOLIC BLOOD PRESSURE: 120 MMHG

## 2017-11-13 VITALS
DIASTOLIC BLOOD PRESSURE: 78 MMHG | HEART RATE: 59 BPM | WEIGHT: 201.5 LBS | SYSTOLIC BLOOD PRESSURE: 124 MMHG | OXYGEN SATURATION: 98 % | BODY MASS INDEX: 32.52 KG/M2

## 2017-11-13 DIAGNOSIS — E11.9 TYPE 2 DIABETES MELLITUS WITHOUT COMPLICATION, WITHOUT LONG-TERM CURRENT USE OF INSULIN (HCC): Primary | ICD-10-CM

## 2017-11-13 DIAGNOSIS — Z23 NEED FOR STREPTOCOCCUS PNEUMONIAE VACCINATION: ICD-10-CM

## 2017-11-13 DIAGNOSIS — Z79.01 ANTICOAGULATED ON COUMADIN: ICD-10-CM

## 2017-11-13 DIAGNOSIS — Z23 NEED FOR INFLUENZA VACCINATION: ICD-10-CM

## 2017-11-13 LAB — INR PPP: 2.1 (ref 0.9–1.1)

## 2017-11-13 PROCEDURE — 99213 OFFICE O/P EST LOW 20 MIN: CPT | Performed by: FAMILY MEDICINE

## 2017-11-13 PROCEDURE — 85610 PROTHROMBIN TIME: CPT | Performed by: NURSE PRACTITIONER

## 2017-11-13 PROCEDURE — 99211 OFF/OP EST MAY X REQ PHY/QHP: CPT | Performed by: NURSE PRACTITIONER

## 2017-11-13 PROCEDURE — 90686 IIV4 VACC NO PRSV 0.5 ML IM: CPT | Performed by: FAMILY MEDICINE

## 2017-11-13 PROCEDURE — 90732 PPSV23 VACC 2 YRS+ SUBQ/IM: CPT | Performed by: FAMILY MEDICINE

## 2017-11-13 PROCEDURE — 90471 IMMUNIZATION ADMIN: CPT | Performed by: FAMILY MEDICINE

## 2017-11-13 PROCEDURE — 90472 IMMUNIZATION ADMIN EACH ADD: CPT | Performed by: FAMILY MEDICINE

## 2017-11-13 RX ORDER — METFORMIN HYDROCHLORIDE 500 MG/1
500 TABLET, EXTENDED RELEASE ORAL
Qty: 90 TABLET | Refills: 3 | Status: SHIPPED | OUTPATIENT
Start: 2017-11-13 | End: 2017-11-14

## 2017-11-13 NOTE — PROGRESS NOTES
Subjective   Chief Complaint   Patient presents with   • Follow-up     3 mo follow up       Frances Motta is a 37 y.o. female who presents for Follow-up (3 mo follow up)   Diabetes   She presents for her follow-up diabetic visit. She has type 2 diabetes mellitus. There are no hypoglycemic associated symptoms. Pertinent negatives for diabetes include no chest pain, no fatigue, no foot paresthesias, no polydipsia, no polyphagia and no polyuria. Current diabetic treatment includes insulin injections and oral agent (monotherapy). She is compliant with treatment all of the time. (-162 (average is 120's); post prandial is )     Diuretics have not recently changed. She denies any difficulty urinating or decreased urination.     The following portions of the patient's history were reviewed and updated as appropriate:problem list, current medications, allergies, past family history, past medical history, past social history and past surgical history    Past Medical History:   Diagnosis Date   • AICD (automatic cardioverter/defibrillator) present    • Anticoagulation goal of INR 2.5 to 3.5    • Asthma    • Cardiomyopathy    • Congestive heart failure    • Diabetes mellitus    • Disease of thyroid gland     hypothyroidism    • Enlarged heart    • Hx of mitral valve replacement with mechanical valve     with thrombus 7/2017   • Migraine    • TIA (transient ischemic attack)        Social History   Substance Use Topics   • Smoking status: Former Smoker   • Smokeless tobacco: Never Used   • Alcohol use No     History   Sexual Activity   • Sexual activity: Defer       Medications:  Outpatient Medications Prior to Visit   Medication Sig Dispense Refill   • atorvastatin (LIPITOR) 20 MG tablet Take 1 tablet by mouth Daily. 90 tablet 3   • dronedarone (MULTAQ) 400 MG tablet Take 1 tablet by mouth 2 (Two) Times a Day With Meals. 180 tablet 3   • fenofibrate 160 MG tablet Take 1 tablet by mouth Daily. 90 tablet 3    • furosemide (LASIX) 40 MG tablet Take 1 tablet by mouth Daily. 90 tablet 3   • levothyroxine (SYNTHROID, LEVOTHROID) 50 MCG tablet Take 1 tablet by mouth Daily. 90 tablet 3   • metoprolol tartrate (LOPRESSOR) 25 MG tablet Take 0.5 tablets by mouth 2 (Two) Times a Day. 90 tablet 3   • pantoprazole (PROTONIX) 40 MG EC tablet Take 1 tablet by mouth Daily. 90 tablet 3   • warfarin (COUMADIN) 5 MG tablet Take 1 tablet by mouth Dose Adjusted By Provider As Needed. 2 po Monday through Thursday Friday sat sun 3 tabs 180 tablet 6   • Insulin Glargine (LANTUS SOLOSTAR) 100 UNIT/ML injection pen Inject 15 Units under the skin Every Night. 5 pen 3   • metFORMIN (GLUCOPHAGE) 1000 MG tablet Take 1 tablet by mouth 2 (Two) Times a Day. 60 tablet 6     No facility-administered medications prior to visit.        Allergies   Allergen Reactions   • Lortab [Hydrocodone-Acetaminophen]        Review of Systems   Constitutional: Negative for fatigue, fever and unexpected weight change.   Respiratory: Negative for shortness of breath.    Cardiovascular: Negative for chest pain and leg swelling.   Gastrointestinal: Negative for diarrhea, nausea and vomiting.   Endocrine: Negative for polydipsia, polyphagia and polyuria.        No hypoglycemia   Genitourinary: Negative for frequency.       Objective   Visit Vitals   • /78 (BP Location: Left arm, Patient Position: Sitting, Cuff Size: Large Adult)   • Pulse 59   • Wt 201 lb 8 oz (91.4 kg)   • LMP 04/30/2015 (Within Months)  Comment: Partial Hysterectomy   • SpO2 98%   • BMI 32.52 kg/m2       Physical Exam   Constitutional: She is oriented to person, place, and time. She appears well-developed and well-nourished. No distress.   HENT:   Head: Normocephalic.   Nose: Nose normal.   Eyes: Conjunctivae are normal.   Neck: Normal range of motion.   Pulmonary/Chest: Effort normal. No respiratory distress.   Abdominal: She exhibits no distension.   Musculoskeletal: Normal range of motion. She  exhibits no edema.   Neurological: She is alert and oriented to person, place, and time. No cranial nerve deficit.   Skin: She is not diaphoretic.   Psychiatric: She has a normal mood and affect. Her behavior is normal.   Nursing note and vitals reviewed.      Assessment/Plan   Frances Motta is a 37 y.o. female seen today for the followin. Type 2 diabetes mellitus without complication, without long-term current use of insulin  Stop metformin due to decreased renal function. Will recheck in 3 months  Increase lantus to 20 units. adivsed if post prandial blood sugar >180 or fasting >130 to increase lantus by 2 units every 2 days until post prandial <180    - Ambulatory Referral to Ophthalmology  - Insulin Glargine (LANTUS SOLOSTAR) 100 UNIT/ML injection pen; Inject 20 Units under the skin Every Night.  Dispense: 5 pen; Refill: 3    2. Need for Streptococcus pneumoniae vaccination    - Pneumococcal Polysaccharide Vaccine 23-Valent Greater Than or Equal To 1yo Subcutaneous / IM    3. Need for influenza vaccination    - Flu Vaccine Intradermal Quad 18-64YR    Follow up: Return in about 3 months (around 2018) for Follow up Diabetes with labs few days before.          This document has been electronically signed by Casandra Pedersen DO on 2017 4:25 PM

## 2017-11-13 NOTE — PATIENT INSTRUCTIONS
If your morning blood sugar is < 90 on the higher dose of lantus, then decrease  Your lantus to 18 units daily

## 2017-11-13 NOTE — PROGRESS NOTES
Pt denies missed coumadin doses or consuming too much green.  Pt states medications have not changed.  No bleeding issues or new s/s/ blood clots at this time.  Adjusted coumadin dose for today only and instructed pt to limit green intake until INR rechecked this Thursday.  Pt verbalizes.  Patient instructed regarding medication; results given and questions answered. Nutritional counseling given.  Dietary factors affecting therapy addressed.  Patient instructed to monitor for excessive bruising or bleeding.          This document has been electronically signed by JR Knox on November 13, 2017 2:57 PM

## 2017-11-14 ENCOUNTER — DOCUMENTATION (OUTPATIENT)
Dept: CARDIAC SURGERY | Facility: CLINIC | Age: 37
End: 2017-11-14

## 2017-11-16 ENCOUNTER — ANTICOAGULATION VISIT (OUTPATIENT)
Dept: CARDIAC SURGERY | Facility: CLINIC | Age: 37
End: 2017-11-16

## 2017-11-16 VITALS — HEART RATE: 62 BPM | SYSTOLIC BLOOD PRESSURE: 148 MMHG | DIASTOLIC BLOOD PRESSURE: 81 MMHG

## 2017-11-16 DIAGNOSIS — Z79.01 ANTICOAGULATED ON COUMADIN: ICD-10-CM

## 2017-11-16 LAB — INR PPP: 2.7 (ref 0.9–1.1)

## 2017-11-16 PROCEDURE — 85610 PROTHROMBIN TIME: CPT | Performed by: NURSE PRACTITIONER

## 2017-11-16 PROCEDURE — 99211 OFF/OP EST MAY X REQ PHY/QHP: CPT | Performed by: NURSE PRACTITIONER

## 2017-11-16 NOTE — PROGRESS NOTES
PT states she took Coumadin and ate green as directed. Denies any new medications or bleeding issues. Denies any s/s of blood clot. PT is on lower end of range but she has elevated multiple times on lesser Coumadin dose. PT instructed on dosing and to increase vit k on Sunday. Patient instructed regarding medication; results given and questions answered. Nutritional counseling given.  Dietary factors affecting therapy addressed.  Patient instructed to monitor for excessive bruising or bleeding. PT verbalizes understanding.         This document has been electronically signed by JR Knox on November 16, 2017 1:16 PM

## 2017-11-20 ENCOUNTER — ANTICOAGULATION VISIT (OUTPATIENT)
Dept: CARDIAC SURGERY | Facility: CLINIC | Age: 37
End: 2017-11-20

## 2017-11-20 VITALS — SYSTOLIC BLOOD PRESSURE: 112 MMHG | DIASTOLIC BLOOD PRESSURE: 70 MMHG | HEART RATE: 76 BPM

## 2017-11-20 DIAGNOSIS — Z79.01 ANTICOAGULATED ON COUMADIN: ICD-10-CM

## 2017-11-20 LAB — INR PPP: 2 (ref 0.9–1.1)

## 2017-11-20 PROCEDURE — 99211 OFF/OP EST MAY X REQ PHY/QHP: CPT | Performed by: NURSE PRACTITIONER

## 2017-11-20 PROCEDURE — 85610 PROTHROMBIN TIME: CPT | Performed by: NURSE PRACTITIONER

## 2017-11-20 NOTE — PROGRESS NOTES
Pt denies missed coumadin doses or consuming too much green.  Pt denies bleeding issues or new s/s blood clots at this time. Adjusted coumadin dose and will recheck INR in two days.  Pt verbalizes.  Patient instructed regarding medication; results given and questions answered. Nutritional counseling given.  Dietary factors affecting therapy addressed.  Patient instructed to monitor for excessive bruising or bleeding.  Electronically signed by JR Shearer

## 2017-11-22 ENCOUNTER — ANTICOAGULATION VISIT (OUTPATIENT)
Dept: CARDIAC SURGERY | Facility: CLINIC | Age: 37
End: 2017-11-22

## 2017-11-22 VITALS — DIASTOLIC BLOOD PRESSURE: 86 MMHG | HEART RATE: 64 BPM | SYSTOLIC BLOOD PRESSURE: 122 MMHG

## 2017-11-22 DIAGNOSIS — Z79.01 ANTICOAGULATED ON COUMADIN: ICD-10-CM

## 2017-11-22 LAB — INR PPP: 2.5 (ref 0.9–1.1)

## 2017-11-22 PROCEDURE — 85610 PROTHROMBIN TIME: CPT | Performed by: NURSE PRACTITIONER

## 2017-11-22 NOTE — PROGRESS NOTES
PT states she took Coumadin and held green as directed. Denies any new medications or bleeding issues. PT denies any chest pain. PT instructed on dosing and to consume 1/2 cup of green tomorrow and 1/2 cup of light green on Sunday. PT will be seen on Monday. Patient instructed regarding medication; results given and questions answered. Nutritional counseling given.  Dietary factors affecting therapy addressed.  Patient instructed to monitor for excessive bruising or bleeding. PT verbalizes understanding.

## 2017-11-27 ENCOUNTER — ANTICOAGULATION VISIT (OUTPATIENT)
Dept: CARDIAC SURGERY | Facility: CLINIC | Age: 37
End: 2017-11-27

## 2017-11-27 VITALS — DIASTOLIC BLOOD PRESSURE: 64 MMHG | SYSTOLIC BLOOD PRESSURE: 112 MMHG | HEART RATE: 61 BPM

## 2017-11-27 DIAGNOSIS — Z79.01 ANTICOAGULATED ON COUMADIN: ICD-10-CM

## 2017-11-27 LAB — INR PPP: 2.8 (ref 0.9–1.1)

## 2017-11-27 PROCEDURE — 85610 PROTHROMBIN TIME: CPT | Performed by: NURSE PRACTITIONER

## 2017-11-27 NOTE — PROGRESS NOTES
Pt states medications have not changed.  No bleeding issues.  Recheck INR this Thursday.  (simplified weekly coumadin dose without changing weekly mg).  Pt verbalizes.  Patient instructed regarding medication; results given and questions answered. Nutritional counseling given.  Dietary factors affecting therapy addressed.  Patient instructed to monitor for excessive bruising or bleeding.          This document has been electronically signed by JR Knox on November 27, 2017 4:04 PM

## 2017-11-30 ENCOUNTER — ANTICOAGULATION VISIT (OUTPATIENT)
Dept: CARDIAC SURGERY | Facility: CLINIC | Age: 37
End: 2017-11-30

## 2017-11-30 VITALS — SYSTOLIC BLOOD PRESSURE: 132 MMHG | HEART RATE: 62 BPM | DIASTOLIC BLOOD PRESSURE: 84 MMHG

## 2017-11-30 DIAGNOSIS — Z79.01 ANTICOAGULATED ON COUMADIN: ICD-10-CM

## 2017-11-30 LAB — INR PPP: 2 (ref 0.9–1.1)

## 2017-11-30 PROCEDURE — 85610 PROTHROMBIN TIME: CPT | Performed by: NURSE PRACTITIONER

## 2017-11-30 PROCEDURE — 99211 OFF/OP EST MAY X REQ PHY/QHP: CPT | Performed by: NURSE PRACTITIONER

## 2017-11-30 NOTE — PROGRESS NOTES
PT states she took dose as directed but did not eat any green. Denies any new medications or bleeding issues. Denies any s/s of blood clot. PT denies any missed doses. Adjusted pt's dose and instructed to hold green vegs until Sunday. PT will be seen on Monday. Patient instructed regarding medication; results given and questions answered. Nutritional counseling given.  Dietary factors affecting therapy addressed.  Patient instructed to monitor for excessive bruising or bleeding. PT verbalizes understanding.           This document has been electronically signed by JR Knox on November 30, 2017 3:50 PM

## 2017-12-04 ENCOUNTER — ANTICOAGULATION VISIT (OUTPATIENT)
Dept: CARDIAC SURGERY | Facility: CLINIC | Age: 37
End: 2017-12-04

## 2017-12-04 VITALS — DIASTOLIC BLOOD PRESSURE: 70 MMHG | HEART RATE: 64 BPM | SYSTOLIC BLOOD PRESSURE: 110 MMHG

## 2017-12-04 DIAGNOSIS — Z79.01 ANTICOAGULATED ON COUMADIN: ICD-10-CM

## 2017-12-04 LAB — INR PPP: 2.1 (ref 0.9–1.1)

## 2017-12-04 PROCEDURE — 99211 OFF/OP EST MAY X REQ PHY/QHP: CPT | Performed by: NURSE PRACTITIONER

## 2017-12-04 PROCEDURE — 85610 PROTHROMBIN TIME: CPT | Performed by: NURSE PRACTITIONER

## 2017-12-04 NOTE — PROGRESS NOTES
Pt denies missed coumadin doses or consuming too much green.  Pt states medications have not changed.  Pt denies too much green consumption.  Adjusted coumadin dose and instructed pt to limit green intake for two days.  Recheck INR this Thursday.  Pt verbalizes.  Patient instructed regarding medication; results given and questions answered. Nutritional counseling given.  Dietary factors affecting therapy addressed.  Patient instructed to monitor for excessive bruising or bleeding.    Electronically signed by JR Shearer

## 2017-12-07 ENCOUNTER — ANTICOAGULATION VISIT (OUTPATIENT)
Dept: CARDIAC SURGERY | Facility: CLINIC | Age: 37
End: 2017-12-07

## 2017-12-07 VITALS — SYSTOLIC BLOOD PRESSURE: 106 MMHG | HEART RATE: 60 BPM | OXYGEN SATURATION: 99 % | DIASTOLIC BLOOD PRESSURE: 78 MMHG

## 2017-12-07 DIAGNOSIS — Z79.01 ANTICOAGULATED ON COUMADIN: ICD-10-CM

## 2017-12-07 LAB — INR PPP: 2.6 (ref 0.9–1.1)

## 2017-12-07 PROCEDURE — 85610 PROTHROMBIN TIME: CPT | Performed by: NURSE PRACTITIONER

## 2017-12-07 NOTE — PROGRESS NOTES
PT states compliant c tx. Pt denies any new medications or bleeding issues. PT denies any s/s of chest pain. PT instructed to continue same dose and consume green on Sunday. PT will be seen in [facility] on Monday. Patient instructed regarding medication; results given and questions answered. Nutritional counseling given.  Dietary factors affecting therapy addressed.  Patient instructed to monitor for excessive bruising or bleeding. PT verbalizes understanding.   Electronically signed by JR Shearer

## 2017-12-11 ENCOUNTER — ANTICOAGULATION VISIT (OUTPATIENT)
Dept: CARDIAC SURGERY | Facility: CLINIC | Age: 37
End: 2017-12-11

## 2017-12-11 VITALS — SYSTOLIC BLOOD PRESSURE: 110 MMHG | DIASTOLIC BLOOD PRESSURE: 72 MMHG | HEART RATE: 54 BPM

## 2017-12-11 DIAGNOSIS — Z79.01 ANTICOAGULATED ON COUMADIN: ICD-10-CM

## 2017-12-11 LAB — INR PPP: 2.2 (ref 0.9–1.1)

## 2017-12-11 PROCEDURE — 85610 PROTHROMBIN TIME: CPT | Performed by: NURSE PRACTITIONER

## 2017-12-11 NOTE — PROGRESS NOTES
Pt states no changes to meds or diet.  No bleeding issues.  Pt denies missed coumadin doses.  Pt will take coumadin 15mg today, 10mg Tuesday and Wednesday.  Will consider adding a 15mg to pt on Thursday even if she comes in at therapeutic due to pt dropping over the weekends.  Instructed pt to limit all green intake until INR rechecked this Thursday.  Pt verbalizes.  Patient instructed regarding medication; results given and questions answered. Nutritional counseling given.  Dietary factors affecting therapy addressed.  Patient instructed to monitor for excessive bruising or bleeding.        This document has been electronically signed by JR Knox on December 12, 2017 9:29 AM

## 2017-12-12 ENCOUNTER — LAB (OUTPATIENT)
Dept: ONCOLOGY | Facility: HOSPITAL | Age: 37
End: 2017-12-12

## 2017-12-12 ENCOUNTER — OFFICE VISIT (OUTPATIENT)
Dept: ONCOLOGY | Facility: CLINIC | Age: 37
End: 2017-12-12

## 2017-12-12 VITALS
HEART RATE: 59 BPM | RESPIRATION RATE: 20 BRPM | SYSTOLIC BLOOD PRESSURE: 119 MMHG | BODY MASS INDEX: 31.6 KG/M2 | DIASTOLIC BLOOD PRESSURE: 78 MMHG | TEMPERATURE: 97.7 F | WEIGHT: 196.65 LBS | HEIGHT: 66 IN

## 2017-12-12 DIAGNOSIS — Z79.01 ANTICOAGULATED ON COUMADIN: ICD-10-CM

## 2017-12-12 DIAGNOSIS — Z95.2 HX OF MITRAL VALVE REPLACEMENT WITH MECHANICAL VALVE: ICD-10-CM

## 2017-12-12 DIAGNOSIS — Z79.01 ANTICOAGULATED ON COUMADIN: Primary | ICD-10-CM

## 2017-12-12 LAB
ALBUMIN SERPL-MCNC: 4.1 G/DL (ref 3.4–4.8)
ALBUMIN/GLOB SERPL: 1.3 G/DL (ref 1.1–1.8)
ALP SERPL-CCNC: 60 U/L (ref 38–126)
ALT SERPL W P-5'-P-CCNC: 45 U/L (ref 9–52)
ANION GAP SERPL CALCULATED.3IONS-SCNC: 12 MMOL/L (ref 5–15)
AST SERPL-CCNC: 34 U/L (ref 14–36)
BASOPHILS # BLD AUTO: 0.02 10*3/MM3 (ref 0–0.2)
BASOPHILS NFR BLD AUTO: 0.3 % (ref 0–2)
BILIRUB SERPL-MCNC: 0.4 MG/DL (ref 0.2–1.3)
BUN BLD-MCNC: 14 MG/DL (ref 7–21)
BUN/CREAT SERPL: 13.6 (ref 7–25)
CALCIUM SPEC-SCNC: 9.2 MG/DL (ref 8.4–10.2)
CHLORIDE SERPL-SCNC: 102 MMOL/L (ref 95–110)
CO2 SERPL-SCNC: 24 MMOL/L (ref 22–31)
CREAT BLD-MCNC: 1.03 MG/DL (ref 0.5–1)
DEPRECATED RDW RBC AUTO: 43 FL (ref 36.4–46.3)
EOSINOPHIL # BLD AUTO: 0.19 10*3/MM3 (ref 0–0.7)
EOSINOPHIL NFR BLD AUTO: 2.5 % (ref 0–7)
ERYTHROCYTE [DISTWIDTH] IN BLOOD BY AUTOMATED COUNT: 14.6 % (ref 11.5–14.5)
GFR SERPL CREATININE-BSD FRML MDRD: 60 ML/MIN/1.73 (ref 64–149)
GLOBULIN UR ELPH-MCNC: 3.2 GM/DL (ref 2.3–3.5)
GLUCOSE BLD-MCNC: 189 MG/DL (ref 60–100)
HCT VFR BLD AUTO: 37.2 % (ref 35–45)
HGB BLD-MCNC: 12.2 G/DL (ref 12–15.5)
IMM GRANULOCYTES # BLD: 0.02 10*3/MM3 (ref 0–0.02)
IMM GRANULOCYTES NFR BLD: 0.3 % (ref 0–0.5)
LYMPHOCYTES # BLD AUTO: 2.48 10*3/MM3 (ref 0.6–4.2)
LYMPHOCYTES NFR BLD AUTO: 33.2 % (ref 10–50)
MCH RBC QN AUTO: 26.5 PG (ref 26.5–34)
MCHC RBC AUTO-ENTMCNC: 32.8 G/DL (ref 31.4–36)
MCV RBC AUTO: 80.7 FL (ref 80–98)
MONOCYTES # BLD AUTO: 0.4 10*3/MM3 (ref 0–0.9)
MONOCYTES NFR BLD AUTO: 5.4 % (ref 0–12)
NEUTROPHILS # BLD AUTO: 4.35 10*3/MM3 (ref 2–8.6)
NEUTROPHILS NFR BLD AUTO: 58.3 % (ref 37–80)
PLATELET # BLD AUTO: 359 10*3/MM3 (ref 150–450)
PMV BLD AUTO: 9.1 FL (ref 8–12)
POTASSIUM BLD-SCNC: 4.1 MMOL/L (ref 3.5–5.1)
PROT SERPL-MCNC: 7.3 G/DL (ref 6.3–8.6)
RBC # BLD AUTO: 4.61 10*6/MM3 (ref 3.77–5.16)
SODIUM BLD-SCNC: 138 MMOL/L (ref 137–145)
WBC NRBC COR # BLD: 7.46 10*3/MM3 (ref 3.2–9.8)

## 2017-12-12 PROCEDURE — 99214 OFFICE O/P EST MOD 30 MIN: CPT | Performed by: INTERNAL MEDICINE

## 2017-12-12 PROCEDURE — 80053 COMPREHEN METABOLIC PANEL: CPT

## 2017-12-12 PROCEDURE — 85025 COMPLETE CBC W/AUTO DIFF WBC: CPT

## 2017-12-12 PROCEDURE — G0463 HOSPITAL OUTPT CLINIC VISIT: HCPCS | Performed by: INTERNAL MEDICINE

## 2017-12-12 NOTE — PROGRESS NOTES
DATE OF VISIT: 12/12/2017    REASON FOR VISIT:  Mechanical mitral valve on Coumadin, problem having Coumadin level in the therapeutic range.    HISTORY OF PRESENT ILLNESS:    37-year-old female with a past medical history significant for open heart surgery with mitral valve replacement with mechanical valve in July 2015, history of AICD placement of week later after open-heart surgery secondary to tachycardia, congestive heart failure has been on Coumadin since July 2015 secondary to mechanical mitral valve.  Patient was initially seen in consultation on September 12, 2017 for evaluation of problem with Coumadin not being in therapeutic range on a persistent basis.  Subsequently patient was referred to Coumadin clinic for monitoring of INR.  Initially patient did well for a month of October with INR being therapeutic all throughout month.  Since November 2017 patient is having again fluctuation in the level of INR.  Denies any new weakness.  Denies any new headache or dizziness.  Denies any bleeding.    PAST MEDICAL HISTORY:    Past Medical History:   Diagnosis Date   • AICD (automatic cardioverter/defibrillator) present    • Anticoagulation goal of INR 2.5 to 3.5    • Asthma    • Cardiomyopathy    • Congestive heart failure    • Diabetes mellitus    • Disease of thyroid gland     hypothyroidism    • Enlarged heart    • Hx of mitral valve replacement with mechanical valve     with thrombus 7/2017   • Migraine    • TIA (transient ischemic attack)        SOCIAL HISTORY:    Social History   Substance Use Topics   • Smoking status: Former Smoker   • Smokeless tobacco: Never Used   • Alcohol use No       Surgical History :  Past Surgical History:   Procedure Laterality Date   • A-V CARDIAC PACEMAKER INSERTION     • APPENDECTOMY     • ATRIAL CARDIAC PACEMAKER INSERTION     • CARDIAC CATHETERIZATION     • CARDIAC SURGERY     • CARDIAC VALVE SURGERY     • CYSTOSCOPY  09/23/2015   • HYSTERECTOMY     • TONSILLECTOMY     •  "TRANSESOPHAGEAL ECHOCARDIOGRAM (CABRERA)         ALLERGIES:    Allergies   Allergen Reactions   • Lortab [Hydrocodone-Acetaminophen]          FAMILY HISTORY:  Family History   Problem Relation Age of Onset   • Diabetes Father    • Hypertension Father    • Diabetes Maternal Grandmother    • Kidney disease Maternal Grandmother    • Hypertension Maternal Grandmother    • Heart disease Maternal Grandmother    • Stroke Maternal Grandmother    • Thyroid disease Maternal Grandmother    • Cancer Maternal Grandfather    • Kidney disease Paternal Grandmother    • Diabetes Paternal Grandfather            REVIEW OF SYSTEMS:      CONSTITUTIONAL:  Complains of fatigue. Denies any fever, chills or weight loss.      HEENT:  No epistaxis, mouth sores or difficulty swallowing.     RESPIRATORY:  No new shortness of breath. No new cough or hemoptysis.     CARDIOVASCULAR:  Complains of chest pain with exertion.  No palpitation.     GASTROINTESTINAL:  No abdominal pain nausea, vomiting or blood in the stool.     GENITOURINARY:  No Dysuria or Hematuria.     MUSCULOSKELETAL:  No new back pain or arthralgia..     LYMPHATICS:  Denies any abnormal swollen glands anywhere in the body.     NEUROLOGICAL : Complains of tingling and numbness affecting the right thigh since episode of hemiparesis in July 2017.  No new headaches or dizziness.   No seizures or balance problems.     SKIN:  Denies any new skin rash.        PHYSICAL EXAMINATION:      VITAL SIGNS:  /78  Pulse 59  Temp 97.7 °F (36.5 °C) (Temporal Artery )   Resp 20  Ht 167.6 cm (65.98\")  Wt 89.2 kg (196 lb 10.4 oz)  LMP 04/30/2015 (Within Months) Comment: Partial Hysterectomy  BMI 31.76 kg/m2  Last 3 weights    12/12/17  1034   Weight: 89.2 kg (196 lb 10.4 oz)       GENERAL:  Not in any distress.    HEENT:  Normocephalic, Atraumatic.Mild Conjunctival pallor. No icterus. Extraocular Movements Intact. No Facial Asymmetry noted.    NECK:  No adenopathy. No JVD.    RESPIRATORY:  Fair " air entry bilateral. No rhonchi or wheezing.    CARDIOVASCULAR:  S1, S2. Regular rate and rhythm. No murmur or gallop appreciated.  Prostatic valve sound heard.    ABDOMEN:  Soft, obese, nontender. Bowel sounds present in all four quadrants.  No organomegaly appreciated.    EXTREMITIES:  No edema.No Calf Tenderness.    NEUROLOGIC:  Alert, awake and oriented ×3.  No  Motor  deficit appreciated. Cranial Nerves 2-12 grossly intact.            DIAGNOSTIC DATA:    Glucose   Date Value Ref Range Status   12/12/2017 189 (H) 60 - 100 mg/dL Final     Sodium   Date Value Ref Range Status   12/12/2017 138 137 - 145 mmol/L Final     Potassium   Date Value Ref Range Status   12/12/2017 4.1 3.5 - 5.1 mmol/L Final     CO2   Date Value Ref Range Status   12/12/2017 24.0 22.0 - 31.0 mmol/L Final     Chloride   Date Value Ref Range Status   12/12/2017 102 95 - 110 mmol/L Final     Anion Gap   Date Value Ref Range Status   12/12/2017 12.0 5.0 - 15.0 mmol/L Final     Creatinine   Date Value Ref Range Status   12/12/2017 1.03 (H) 0.50 - 1.00 mg/dL Final     BUN   Date Value Ref Range Status   12/12/2017 14 7 - 21 mg/dL Final     BUN/Creatinine Ratio   Date Value Ref Range Status   12/12/2017 13.6 7.0 - 25.0 Final     Calcium   Date Value Ref Range Status   12/12/2017 9.2 8.4 - 10.2 mg/dL Final     eGFR Non  Amer   Date Value Ref Range Status   12/12/2017 60 (L) 64 - 149 mL/min/1.73 Final     Alkaline Phosphatase   Date Value Ref Range Status   12/12/2017 60 38 - 126 U/L Final     Total Protein   Date Value Ref Range Status   12/12/2017 7.3 6.3 - 8.6 g/dL Final     ALT (SGPT)   Date Value Ref Range Status   12/12/2017 45 9 - 52 U/L Final     AST (SGOT)   Date Value Ref Range Status   12/12/2017 34 14 - 36 U/L Final     Total Bilirubin   Date Value Ref Range Status   12/12/2017 0.4 0.2 - 1.3 mg/dL Final     Albumin   Date Value Ref Range Status   12/12/2017 4.10 3.40 - 4.80 g/dL Final     Globulin   Date Value Ref Range Status    12/12/2017 3.2 2.3 - 3.5 gm/dL Final     A/G Ratio   Date Value Ref Range Status   12/12/2017 1.3 1.1 - 1.8 g/dL Final     Lab Results   Component Value Date    WBC 7.46 12/12/2017    HGB 12.2 12/12/2017    HCT 37.2 12/12/2017    MCV 80.7 12/12/2017     12/12/2017     Lab Results   Component Value Date    NEUTROABS 4.35 12/12/2017    IRON 27 (L) 12/01/2015    TIBC 416 12/01/2015    LABIRON 6.5 (L) 12/01/2015     Component      Latest Ref Rng & Units 10/2/2017 10/5/2017 10/10/2017 10/13/2017                INR      0.9 - 1.1 3.30 (A) 4.10 (A) 3.70 (A) 2.70 (A)     Component      Latest Ref Rng & Units 10/17/2017 10/20/2017 10/23/2017 10/26/2017                INR      0.9 - 1.1 3.60 (A) 3.20 (A) 3.60 (A) 2.60 (A)     Component      Latest Ref Rng & Units 10/30/2017 11/2/2017 11/6/2017 11/9/2017                INR      0.9 - 1.1 2.20 (A) 2.10 (A) 2.00 (A) 2.60 (A)     Component      Latest Ref Rng & Units 11/13/2017 11/16/2017 11/20/2017 11/22/2017                INR      0.9 - 1.1 2.10 (A) 2.70 (A) 2.00 (A) 2.50 (A)     Component      Latest Ref Rng & Units 11/27/2017 11/30/2017 12/4/2017 12/7/2017                INR      0.9 - 1.1 2.80 (A) 2.00 (A) 2.10 (A) 2.60 (A)     Component      Latest Ref Rng & Units 12/11/2017             INR      0.9 - 1.1 2.20 (A)           Assessment and plan:    1.  Problems with getting therapeutic INR range with Coumadin: Patient states she has been Coumadin since July 2015 secondary to her mechanical mitral valve.  In between her INR was subtherapeutic requiring frequent change in the dosing of Coumadin.   currently patient is taking Coumadin 10 mg and 15 mg dose depending on her INR.  Since last clinic visit when patient was referred to Coumadin clinic, in October 2017 as I mentioned her INR was therapeutic all the time.  Since November 2017 in between her INR is becoming subtherapeutic again.  Recommend having continuous pattern of dietary habit.  Avoid any fluctuation in  eating greens.  Recommend following up with Coumadin clinic with close adjustment of her Coumadin dosing to get INR therapeutic.  We'll see her back in about 3 months with a repeat CBC and CMP on that day.    2.  Recent hemiparesis on the right side: Clinically patient does not have any deficit at this point.  Recommend following with the neurology at this point.     3.  Intracranial aneurysm: Patient was found to have aneurysm which was incidental finding.   Currently being followed by neurosurgeon, no other intervention is planned at this point.     4.  Health maintenance: Patient does not smoke currently.  She is only 37 years of age and not due for colonoscopy at this point.  Remains full code.          Remington Welch MD  12/12/2017  11:03 AM        EMR Dragon/Transcription disclaimer:   Much of this encounter note is an electronic transcription/translation of spoken language to printed text. The electronic translation of spoken language may permit erroneous, or at times, nonsensical words or phrases to be inadvertently transcribed; Although I have reviewed the note for such errors, some may still exist.

## 2017-12-14 ENCOUNTER — ANTICOAGULATION VISIT (OUTPATIENT)
Dept: CARDIAC SURGERY | Facility: CLINIC | Age: 37
End: 2017-12-14

## 2017-12-14 VITALS — DIASTOLIC BLOOD PRESSURE: 70 MMHG | HEART RATE: 72 BPM | SYSTOLIC BLOOD PRESSURE: 98 MMHG

## 2017-12-14 DIAGNOSIS — Z79.01 ANTICOAGULATED ON COUMADIN: ICD-10-CM

## 2017-12-14 LAB — INR PPP: 2.1 (ref 0.9–1.1)

## 2017-12-14 PROCEDURE — 85610 PROTHROMBIN TIME: CPT | Performed by: NURSE PRACTITIONER

## 2017-12-14 NOTE — PROGRESS NOTES
PT states she was seen per Dr Welch this week and was instructed to eat green vegs 3x per week consistently regardless what INR was. PT states she will eat 1/2 cup broccoli on Wednesday, 1/2 cup peas on Sunday, and side salad on Sunday. PT instructed that it must be done consistently and it will cause INR to drop if, even slightly, while she is already subtherapeutic. Pt verbalizes understanding. Instructed pt on dosing and consistent diet. PT will be seen on Monday. Patient instructed regarding medication; results given and questions answered. Nutritional counseling given.  Dietary factors affecting therapy addressed.  Patient instructed to monitor for excessive bruising or bleeding. PT verbalizes understanding.           This document has been electronically signed by JR Knox on December 15, 2017 9:39 AM

## 2017-12-18 ENCOUNTER — ANTICOAGULATION VISIT (OUTPATIENT)
Dept: CARDIAC SURGERY | Facility: CLINIC | Age: 37
End: 2017-12-18

## 2017-12-18 VITALS — HEART RATE: 64 BPM | DIASTOLIC BLOOD PRESSURE: 68 MMHG | SYSTOLIC BLOOD PRESSURE: 102 MMHG

## 2017-12-18 DIAGNOSIS — Z79.01 ANTICOAGULATED ON COUMADIN: ICD-10-CM

## 2017-12-18 LAB — INR PPP: 2.4 (ref 0.9–1.1)

## 2017-12-18 PROCEDURE — 99211 OFF/OP EST MAY X REQ PHY/QHP: CPT | Performed by: NURSE PRACTITIONER

## 2017-12-18 PROCEDURE — 85610 PROTHROMBIN TIME: CPT | Performed by: NURSE PRACTITIONER

## 2017-12-18 NOTE — PROGRESS NOTES
PT states she ate green as previously discussed on Sunday, Wednesday, and Friday. PT denies any med changes or bleeding issues. PT denies any s/s of blood clot. Adjusted pt's dose and instructed to continue consistent green intake. Patient instructed regarding medication; results given and questions answered. Nutritional counseling given.  Dietary factors affecting therapy addressed.  Patient instructed to monitor for excessive bruising or bleeding. PT will be seen on Thursday.   Electronically signed by JR Shearer

## 2017-12-21 ENCOUNTER — ANTICOAGULATION VISIT (OUTPATIENT)
Dept: CARDIAC SURGERY | Facility: CLINIC | Age: 37
End: 2017-12-21

## 2017-12-21 VITALS — SYSTOLIC BLOOD PRESSURE: 124 MMHG | DIASTOLIC BLOOD PRESSURE: 70 MMHG | HEART RATE: 60 BPM

## 2017-12-21 DIAGNOSIS — Z79.01 ANTICOAGULATED ON COUMADIN: ICD-10-CM

## 2017-12-21 LAB — INR PPP: 3.3 (ref 0.9–1.1)

## 2017-12-21 PROCEDURE — 85610 PROTHROMBIN TIME: CPT | Performed by: NURSE PRACTITIONER

## 2017-12-21 NOTE — PROGRESS NOTES
PT states she ate green as directed. PT denies any new medications or bleeding issues. PT denies any chest pain. Stressed to pt to continue green vegs 3x per week. PT states she will. PT kept on same dose just spread out. Patient instructed regarding medication; results given and questions answered. Nutritional counseling given.  Dietary factors affecting therapy addressed.  Patient instructed to monitor for excessive bruising or bleeding. PT verbalizes understanding.   Electronically signed by JR Shearer

## 2017-12-27 ENCOUNTER — ANTICOAGULATION VISIT (OUTPATIENT)
Dept: CARDIAC SURGERY | Facility: CLINIC | Age: 37
End: 2017-12-27

## 2017-12-27 VITALS — SYSTOLIC BLOOD PRESSURE: 102 MMHG | HEART RATE: 56 BPM | DIASTOLIC BLOOD PRESSURE: 70 MMHG

## 2017-12-27 DIAGNOSIS — Z79.01 ANTICOAGULATED ON COUMADIN: ICD-10-CM

## 2017-12-27 LAB — INR PPP: 3.4 (ref 0.9–1.1)

## 2017-12-27 PROCEDURE — 85610 PROTHROMBIN TIME: CPT | Performed by: NURSE PRACTITIONER

## 2017-12-27 NOTE — PROGRESS NOTES
Pt states no changes to meds or diet.  No bleeding issues.  Recheck INR next Tuesday.  Pt verbalizes.  Patient instructed regarding medication; results given and questions answered. Nutritional counseling given.  Dietary factors affecting therapy addressed.  Patient instructed to monitor for excessive bruising or bleeding.  Electronically signed by JR Shearer

## 2018-01-02 ENCOUNTER — ANTICOAGULATION VISIT (OUTPATIENT)
Dept: CARDIAC SURGERY | Facility: CLINIC | Age: 38
End: 2018-01-02

## 2018-01-02 VITALS — DIASTOLIC BLOOD PRESSURE: 70 MMHG | HEART RATE: 60 BPM | SYSTOLIC BLOOD PRESSURE: 110 MMHG

## 2018-01-02 DIAGNOSIS — Z79.01 ANTICOAGULATED ON COUMADIN: ICD-10-CM

## 2018-01-02 LAB — INR PPP: 4.3 (ref 0.9–1.1)

## 2018-01-02 PROCEDURE — 85610 PROTHROMBIN TIME: CPT | Performed by: NURSE PRACTITIONER

## 2018-01-02 PROCEDURE — 99211 OFF/OP EST MAY X REQ PHY/QHP: CPT | Performed by: NURSE PRACTITIONER

## 2018-01-04 ENCOUNTER — ANTICOAGULATION VISIT (OUTPATIENT)
Dept: CARDIAC SURGERY | Facility: CLINIC | Age: 38
End: 2018-01-04

## 2018-01-04 VITALS — SYSTOLIC BLOOD PRESSURE: 112 MMHG | DIASTOLIC BLOOD PRESSURE: 80 MMHG | OXYGEN SATURATION: 99 % | HEART RATE: 61 BPM

## 2018-01-04 DIAGNOSIS — Z79.01 ANTICOAGULATED ON COUMADIN: ICD-10-CM

## 2018-01-04 LAB — INR PPP: 4.1 (ref 0.9–1.1)

## 2018-01-04 PROCEDURE — 85610 PROTHROMBIN TIME: CPT | Performed by: NURSE PRACTITIONER

## 2018-01-04 PROCEDURE — 99211 OFF/OP EST MAY X REQ PHY/QHP: CPT | Performed by: NURSE PRACTITIONER

## 2018-01-04 NOTE — PROGRESS NOTES
PT states she ate broccoli on Tuesday and took Coumadin as directed. Denies any bleeding issues, med changes or s/s of chest pain. Adjusted pt's dose and instructed to have small amount extra green today but to also have usual green on Friday and Sunday. PT will be seen on Monday. Patient instructed regarding medication; results given and questions answered. Nutritional counseling given.  Dietary factors affecting therapy addressed.  Patient instructed to monitor for excessive bruising or bleeding. PT verbalizes understanding.         This document has been electronically signed by JR Knox on January 4, 2018 1:11 PM

## 2018-01-08 ENCOUNTER — ANTICOAGULATION VISIT (OUTPATIENT)
Dept: CARDIAC SURGERY | Facility: CLINIC | Age: 38
End: 2018-01-08

## 2018-01-08 VITALS — DIASTOLIC BLOOD PRESSURE: 72 MMHG | HEART RATE: 64 BPM | SYSTOLIC BLOOD PRESSURE: 106 MMHG

## 2018-01-08 DIAGNOSIS — Z79.01 ANTICOAGULATED ON COUMADIN: ICD-10-CM

## 2018-01-08 LAB — INR PPP: 4.7 (ref 0.9–1.1)

## 2018-01-08 PROCEDURE — 85610 PROTHROMBIN TIME: CPT | Performed by: NURSE PRACTITIONER

## 2018-01-08 PROCEDURE — 99211 OFF/OP EST MAY X REQ PHY/QHP: CPT | Performed by: NURSE PRACTITIONER

## 2018-01-08 NOTE — PROGRESS NOTES
Unable to determine cause of continued elevation. PT states she ate her green on previously discussed days. Denies any bleeding issues, med changes or s/s of blood clot. Adjusted pt's dose and instructed to increase vit k 1 cup cup of broccoli today and eat green as discussed on Wednesday. PT instructed to watch for any s/s of bleeding and report bleeding or blunt trauma to ER. PT will be seen on Thursday. Patient instructed regarding medication; results given and questions answered. Nutritional counseling given.  Dietary factors affecting therapy addressed.  Patient instructed to monitor for excessive bruising or bleeding. PT verbalizes understanding.           This document has been electronically signed by JR Knox on January 8, 2018 1:41 PM

## 2018-01-11 ENCOUNTER — ANTICOAGULATION VISIT (OUTPATIENT)
Dept: CARDIAC SURGERY | Facility: CLINIC | Age: 38
End: 2018-01-11

## 2018-01-11 VITALS — DIASTOLIC BLOOD PRESSURE: 72 MMHG | SYSTOLIC BLOOD PRESSURE: 102 MMHG

## 2018-01-11 DIAGNOSIS — Z79.01 ANTICOAGULATED ON COUMADIN: ICD-10-CM

## 2018-01-11 LAB — INR PPP: 2.4 (ref 0.9–1.1)

## 2018-01-11 PROCEDURE — 99211 OFF/OP EST MAY X REQ PHY/QHP: CPT | Performed by: NURSE PRACTITIONER

## 2018-01-11 PROCEDURE — 85610 PROTHROMBIN TIME: CPT | Performed by: NURSE PRACTITIONER

## 2018-01-11 NOTE — PROGRESS NOTES
"PT states she took dose and ate green as directed. Denies any new medications or bleeding issues. PT states she is having chest tightness. Offered to take pt to ER but she states \"I do this sometimes and I don't want another bill.\" PT instructed to go to ER if pain becomes more severe or long lasting. Adjusted pt's dose and instructed to have usual green on Friday and Sunday. PT will be seen in [facility] on Monday. Patient instructed regarding medication; results given and questions answered. Nutritional counseling given.  Dietary factors affecting therapy addressed.  Patient instructed to monitor for excessive bruising or bleeding. PT verbalizes understanding.           This document has been electronically signed by JR Knox on January 12, 2018 10:59 AM      "

## 2018-01-15 ENCOUNTER — ANTICOAGULATION VISIT (OUTPATIENT)
Dept: CARDIAC SURGERY | Facility: CLINIC | Age: 38
End: 2018-01-15

## 2018-01-15 VITALS — HEART RATE: 62 BPM | SYSTOLIC BLOOD PRESSURE: 111 MMHG | DIASTOLIC BLOOD PRESSURE: 68 MMHG

## 2018-01-15 DIAGNOSIS — Z79.01 ANTICOAGULATED ON COUMADIN: ICD-10-CM

## 2018-01-15 LAB — INR PPP: 2.8 (ref 0.9–1.1)

## 2018-01-15 PROCEDURE — 85610 PROTHROMBIN TIME: CPT | Performed by: NURSE PRACTITIONER

## 2018-01-19 ENCOUNTER — ANTICOAGULATION VISIT (OUTPATIENT)
Dept: CARDIAC SURGERY | Facility: CLINIC | Age: 38
End: 2018-01-19

## 2018-01-19 VITALS — HEART RATE: 61 BPM | DIASTOLIC BLOOD PRESSURE: 78 MMHG | SYSTOLIC BLOOD PRESSURE: 102 MMHG

## 2018-01-19 DIAGNOSIS — Z79.01 ANTICOAGULATED ON COUMADIN: ICD-10-CM

## 2018-01-19 LAB — INR PPP: 2.4 (ref 0.9–1.1)

## 2018-01-19 PROCEDURE — 85610 PROTHROMBIN TIME: CPT | Performed by: NURSE PRACTITIONER

## 2018-01-19 PROCEDURE — 99211 OFF/OP EST MAY X REQ PHY/QHP: CPT | Performed by: NURSE PRACTITIONER

## 2018-01-19 NOTE — PROGRESS NOTES
PT states compliant c tx. PT denies any new medications or bleeding issues. PT denies any s/s of blood clot. PT denies any missed doses or excessive k. Adjusted pt's dose and instructed continue previously discussed diet. PT will be seen on Monday. Patient instructed regarding medication; results given and questions answered. Nutritional counseling given.  Dietary factors affecting therapy addressed.  Patient instructed to monitor for excessive bruising or bleeding. PT verbalizes understanding.   Electronically signed by JR Shearer

## 2018-01-22 ENCOUNTER — ANTICOAGULATION VISIT (OUTPATIENT)
Dept: CARDIAC SURGERY | Facility: CLINIC | Age: 38
End: 2018-01-22

## 2018-01-22 VITALS — HEART RATE: 68 BPM | SYSTOLIC BLOOD PRESSURE: 104 MMHG | DIASTOLIC BLOOD PRESSURE: 58 MMHG

## 2018-01-22 DIAGNOSIS — Z79.01 ANTICOAGULATED ON COUMADIN: ICD-10-CM

## 2018-01-22 LAB — INR PPP: 3.4 (ref 0.9–1.1)

## 2018-01-22 PROCEDURE — 85610 PROTHROMBIN TIME: CPT | Performed by: NURSE PRACTITIONER

## 2018-01-22 NOTE — PROGRESS NOTES
Pt states no changes to meds or diet.  No bleeding issues.  Recheck INR this Thursday per twice weekly check protocol for pt.  Pt verbalizes.  Patient instructed regarding medication; results given and questions answered. Nutritional counseling given.  Dietary factors affecting therapy addressed.  Patient instructed to monitor for excessive bruising or bleeding.          This document has been electronically signed by JR Knox on January 23, 2018 9:23 AM

## 2018-01-25 ENCOUNTER — ANTICOAGULATION VISIT (OUTPATIENT)
Dept: CARDIAC SURGERY | Facility: CLINIC | Age: 38
End: 2018-01-25

## 2018-01-25 VITALS — DIASTOLIC BLOOD PRESSURE: 72 MMHG | OXYGEN SATURATION: 99 % | HEART RATE: 64 BPM | SYSTOLIC BLOOD PRESSURE: 90 MMHG

## 2018-01-25 DIAGNOSIS — Z79.01 ANTICOAGULATED ON COUMADIN: ICD-10-CM

## 2018-01-25 LAB — INR PPP: 2.9 (ref 0.9–1.1)

## 2018-01-25 PROCEDURE — 85610 PROTHROMBIN TIME: CPT | Performed by: NURSE PRACTITIONER

## 2018-01-25 NOTE — PROGRESS NOTES
PT states she took Coumadin and ate green as directed. PT is starting Glimepiride tomorrow. PT instructed to bring dosing with her on appt Monday. PT denies any bleeding issues or s/s of blood clot. PT instructed to continue same dose and Coumadin friendly diet. PT will be seen on Monday. Patient instructed regarding medication; results given and questions answered. Nutritional counseling given.  Dietary factors affecting therapy addressed.  Patient instructed to monitor for excessive bruising or bleeding. PT verbalizes understanding.           This document has been electronically signed by JR Knox on January 26, 2018 2:36 PM

## 2018-01-29 ENCOUNTER — ANTICOAGULATION VISIT (OUTPATIENT)
Dept: CARDIAC SURGERY | Facility: CLINIC | Age: 38
End: 2018-01-29

## 2018-01-29 VITALS — SYSTOLIC BLOOD PRESSURE: 113 MMHG | HEART RATE: 60 BPM | DIASTOLIC BLOOD PRESSURE: 82 MMHG

## 2018-01-29 DIAGNOSIS — Z79.01 ANTICOAGULATED ON COUMADIN: ICD-10-CM

## 2018-01-29 LAB — INR PPP: 3.9 (ref 0.9–1.1)

## 2018-01-29 PROCEDURE — 99211 OFF/OP EST MAY X REQ PHY/QHP: CPT | Performed by: NURSE PRACTITIONER

## 2018-01-29 PROCEDURE — 85610 PROTHROMBIN TIME: CPT | Performed by: NURSE PRACTITIONER

## 2018-01-29 NOTE — PROGRESS NOTES
Pt did start the Glimeperide on Friday which does show potential for interaction with coumadin.  Pt denies bleeding issues.  Adjusted coumadin dose and instructed pt to increase Vit K intake today with a cup serving of broccoi.  Recheck INR this Thursday.  Pt verbalizes.  Patient instructed regarding medication; results given and questions answered. Nutritional counseling given.  Dietary factors affecting therapy addressed.  Patient instructed to monitor for excessive bruising or bleeding.  Electronically signed by JR Shearer

## 2018-02-01 ENCOUNTER — ANTICOAGULATION VISIT (OUTPATIENT)
Dept: CARDIAC SURGERY | Facility: CLINIC | Age: 38
End: 2018-02-01

## 2018-02-01 VITALS — OXYGEN SATURATION: 98 % | SYSTOLIC BLOOD PRESSURE: 102 MMHG | DIASTOLIC BLOOD PRESSURE: 72 MMHG | HEART RATE: 59 BPM

## 2018-02-01 DIAGNOSIS — Z79.01 ANTICOAGULATED ON COUMADIN: ICD-10-CM

## 2018-02-01 LAB — INR PPP: 2.8 (ref 0.9–1.1)

## 2018-02-01 PROCEDURE — 85610 PROTHROMBIN TIME: CPT | Performed by: NURSE PRACTITIONER

## 2018-02-01 PROCEDURE — 99211 OFF/OP EST MAY X REQ PHY/QHP: CPT | Performed by: NURSE PRACTITIONER

## 2018-02-01 NOTE — PROGRESS NOTES
PT continues Glimepiride. PT states diet is consistent. Denies any other med changes or bleeding issues. Due to med change and recent elevation, dose was cut back slightly on Sunday and pt will keep appt on Monday. Patient instructed regarding medication; results given and questions answered. Nutritional counseling given.  Dietary factors affecting therapy addressed.  Patient instructed to monitor for excessive bruising or bleeding. PT verbalizes understanding.   Electronically signed by JR Shearer

## 2018-02-05 ENCOUNTER — ANTICOAGULATION VISIT (OUTPATIENT)
Dept: CARDIAC SURGERY | Facility: CLINIC | Age: 38
End: 2018-02-05

## 2018-02-05 VITALS — HEART RATE: 64 BPM | SYSTOLIC BLOOD PRESSURE: 104 MMHG | DIASTOLIC BLOOD PRESSURE: 60 MMHG

## 2018-02-05 DIAGNOSIS — Z79.01 ANTICOAGULATED ON COUMADIN: ICD-10-CM

## 2018-02-05 LAB — INR PPP: 3.4 (ref 0.9–1.1)

## 2018-02-05 PROCEDURE — 85610 PROTHROMBIN TIME: CPT | Performed by: NURSE PRACTITIONER

## 2018-02-05 NOTE — PROGRESS NOTES
Pt denies med changes or bleeding issues.  Instructed pt on coumadin dosing schedule and will recheck INR this Friday.  Pt verbalizes.  Patient instructed regarding medication; results given and questions answered. Nutritional counseling given.  Dietary factors affecting therapy addressed.  Patient instructed to monitor for excessive bruising or bleeding.  Electronically signed by JR Shearer

## 2018-02-09 ENCOUNTER — ANTICOAGULATION VISIT (OUTPATIENT)
Dept: CARDIAC SURGERY | Facility: CLINIC | Age: 38
End: 2018-02-09

## 2018-02-09 VITALS — DIASTOLIC BLOOD PRESSURE: 68 MMHG | OXYGEN SATURATION: 99 % | HEART RATE: 60 BPM | SYSTOLIC BLOOD PRESSURE: 102 MMHG

## 2018-02-09 DIAGNOSIS — Z79.01 ANTICOAGULATED ON COUMADIN: ICD-10-CM

## 2018-02-09 LAB — INR PPP: 2.7 (ref 0.9–1.1)

## 2018-02-09 PROCEDURE — 85610 PROTHROMBIN TIME: CPT | Performed by: NURSE PRACTITIONER

## 2018-02-09 NOTE — PROGRESS NOTES
PT states compliant c tx. PT denies any new medications, bleeding issues or chest pain. PT denies any diet changes. PT instructed to continue dose and Coumadin friendly diet. PT will be seen on Monday. Patient instructed regarding medication; results given and questions answered. Nutritional counseling given.  Dietary factors affecting therapy addressed.  Patient instructed to monitor for excessive bruising or bleeding. PT verbalizes understanding.   Electronically signed by JR Shearer

## 2018-02-12 ENCOUNTER — ANTICOAGULATION VISIT (OUTPATIENT)
Dept: CARDIAC SURGERY | Facility: CLINIC | Age: 38
End: 2018-02-12

## 2018-02-12 VITALS — HEART RATE: 60 BPM | OXYGEN SATURATION: 98 %

## 2018-02-12 DIAGNOSIS — Z79.01 ANTICOAGULATED ON COUMADIN: ICD-10-CM

## 2018-02-12 LAB — INR PPP: 3.2 (ref 0.9–1.1)

## 2018-02-12 PROCEDURE — 85610 PROTHROMBIN TIME: CPT | Performed by: NURSE PRACTITIONER

## 2018-02-12 NOTE — PROGRESS NOTES
PT states compliant c tx. Denies any new medications or bleeding issues. PT denies any diet changes or chest pain. PT instructed to continue same dose and diet. PT will be seen on Thursday. Patient instructed regarding medication; results given and questions answered. Nutritional counseling given.  Dietary factors affecting therapy addressed.  Patient instructed to monitor for excessive bruising or bleeding. PT verbalizes understanding.   Electronically signed by JR Shearer

## 2018-02-15 ENCOUNTER — ANTICOAGULATION VISIT (OUTPATIENT)
Dept: CARDIAC SURGERY | Facility: CLINIC | Age: 38
End: 2018-02-15

## 2018-02-15 VITALS — HEART RATE: 72 BPM | DIASTOLIC BLOOD PRESSURE: 64 MMHG | SYSTOLIC BLOOD PRESSURE: 117 MMHG

## 2018-02-15 DIAGNOSIS — Z79.01 ANTICOAGULATED ON COUMADIN: ICD-10-CM

## 2018-02-15 LAB — INR PPP: 3.2 (ref 0.9–1.1)

## 2018-02-15 PROCEDURE — 85610 PROTHROMBIN TIME: CPT | Performed by: NURSE PRACTITIONER

## 2018-02-15 NOTE — PROGRESS NOTES
PT states compliant c tx. PT denies any new medications or bleeding issues. PT denies any s/s of blood clot. PT instructed to continue same dose and Coumadin friendly diet. PT will be seen on Monday. Patient instructed regarding medication; results given and questions answered. Nutritional counseling given.  Dietary factors affecting therapy addressed.  Patient instructed to monitor for excessive bruising or bleeding. Pt verbalizes understanding.         This document has been electronically signed by JR Knox on February 15, 2018 11:55 AM

## 2018-02-19 ENCOUNTER — ANTICOAGULATION VISIT (OUTPATIENT)
Dept: CARDIAC SURGERY | Facility: CLINIC | Age: 38
End: 2018-02-19

## 2018-02-19 VITALS — HEART RATE: 68 BPM | SYSTOLIC BLOOD PRESSURE: 110 MMHG | DIASTOLIC BLOOD PRESSURE: 60 MMHG

## 2018-02-19 DIAGNOSIS — Z79.01 ANTICOAGULATED ON COUMADIN: ICD-10-CM

## 2018-02-19 LAB — INR PPP: 5.1 (ref 0.9–1.1)

## 2018-02-19 PROCEDURE — 99211 OFF/OP EST MAY X REQ PHY/QHP: CPT | Performed by: NURSE PRACTITIONER

## 2018-02-19 PROCEDURE — 85610 PROTHROMBIN TIME: CPT | Performed by: NURSE PRACTITIONER

## 2018-02-19 NOTE — PROGRESS NOTES
Pt states no changes to meds or diet.  Unable to determine cause for elevation. Instructed pt to hold tonight's coumadin dose and to increase Vit K intake today with a large broccoli serving.  Notify provider if you experience excessive bleeding from the nose, cuts, gums, rectum, urinary tract, or vagina. Reddish or brown urine or stool. Vomiting of blood or hemorrhoidal bleeding. If major injury occurs present to the Emergency Department. Pt denies bleeding issues at this time.  Recheck INR tomorrow.  Pt verbalizes.    Electronically signed by JR Shearer

## 2018-02-20 ENCOUNTER — ANTICOAGULATION VISIT (OUTPATIENT)
Dept: CARDIAC SURGERY | Facility: CLINIC | Age: 38
End: 2018-02-20

## 2018-02-20 VITALS — DIASTOLIC BLOOD PRESSURE: 68 MMHG | HEART RATE: 72 BPM | SYSTOLIC BLOOD PRESSURE: 110 MMHG

## 2018-02-20 DIAGNOSIS — Z79.01 ANTICOAGULATED ON COUMADIN: ICD-10-CM

## 2018-02-20 LAB — INR PPP: 3 (ref 0.9–1.1)

## 2018-02-20 PROCEDURE — 85610 PROTHROMBIN TIME: CPT | Performed by: NURSE PRACTITIONER

## 2018-02-20 NOTE — PROGRESS NOTES
Pt here today for recheck of elevated INR for unknown cause.  Pt denies bleeding issues.  Instructed pt on coumadin dosing schedule and will recheck INR this Friday.  Pt verbalizes.  Patient instructed regarding medication; results given and questions answered. Nutritional counseling given.  Dietary factors affecting therapy addressed.  Patient instructed to monitor for excessive bruising or bleeding.  Electronically signed by JR Shearer

## 2018-02-23 ENCOUNTER — ANTICOAGULATION VISIT (OUTPATIENT)
Dept: CARDIAC SURGERY | Facility: CLINIC | Age: 38
End: 2018-02-23

## 2018-02-23 VITALS — HEART RATE: 60 BPM | DIASTOLIC BLOOD PRESSURE: 70 MMHG | SYSTOLIC BLOOD PRESSURE: 120 MMHG | OXYGEN SATURATION: 99 %

## 2018-02-23 DIAGNOSIS — Z79.01 ANTICOAGULATED ON COUMADIN: ICD-10-CM

## 2018-02-23 LAB — INR PPP: 2.3 (ref 0.9–1.1)

## 2018-02-23 PROCEDURE — 85610 PROTHROMBIN TIME: CPT | Performed by: NURSE PRACTITIONER

## 2018-02-23 PROCEDURE — 99211 OFF/OP EST MAY X REQ PHY/QHP: CPT | Performed by: NURSE PRACTITIONER

## 2018-02-23 NOTE — PROGRESS NOTES
PT states she took Coumadin and ate usual diet. Denies any new medications or bleeding issues. PT denies any s/s of blood clot. Adjusted pt's dose and instructed to consume previously discussed diet. PT will be seen on Monday. Patient instructed regarding medication; results given and questions answered. Nutritional counseling given.  Dietary factors affecting therapy addressed.  Patient instructed to monitor for excessive bruising or bleeding. PT verbalizes understanding.   Electronically signed by JR Shearer

## 2018-02-27 ENCOUNTER — DOCUMENTATION (OUTPATIENT)
Dept: CARDIAC SURGERY | Facility: CLINIC | Age: 38
End: 2018-02-27

## 2018-02-27 NOTE — PROGRESS NOTES
Pt called and stated she was discharged from [facility] yesterday. Pt is unsure of what dose of coumadin she had (if any) Sunday night while in hospital. Pt stated her INR was 2.9 yesterday morning and she took 10mg of coumadin last night. Pt states her Lipitor was increased from 40mg to 50mg and she will be starting the increased dose tonight. Pt was instructed to take Coumadin 7.5mg tonight and 10mg tomorrow night; pt verbalized. Appt made for Thursday.

## 2018-02-28 ENCOUNTER — TRANSCRIBE ORDERS (OUTPATIENT)
Dept: OCCUPATIONAL THERAPY | Facility: HOSPITAL | Age: 38
End: 2018-02-28

## 2018-02-28 ENCOUNTER — HOSPITAL ENCOUNTER (OUTPATIENT)
Dept: PHYSICAL THERAPY | Facility: HOSPITAL | Age: 38
Setting detail: THERAPIES SERIES
Discharge: HOME | End: 2018-02-28

## 2018-02-28 ENCOUNTER — HOSPITAL ENCOUNTER (OUTPATIENT)
Dept: OCCUPATIONAL THERAPY | Facility: HOSPITAL | Age: 38
Setting detail: THERAPIES SERIES
Discharge: HOME | End: 2018-02-28

## 2018-02-28 ENCOUNTER — TRANSCRIBE ORDERS (OUTPATIENT)
Dept: PHYSICAL THERAPY | Facility: HOSPITAL | Age: 38
End: 2018-02-28

## 2018-02-28 DIAGNOSIS — R53.1 WEAKNESS OF RIGHT SIDE OF BODY: ICD-10-CM

## 2018-02-28 DIAGNOSIS — G45.9 TRANSIENT CEREBRAL ISCHEMIA, UNSPECIFIED TYPE: Primary | ICD-10-CM

## 2018-02-28 DIAGNOSIS — Z78.9 IMPAIRED MOBILITY AND ACTIVITIES OF DAILY LIVING: Primary | ICD-10-CM

## 2018-02-28 DIAGNOSIS — Z78.9 IMPAIRED INSTRUMENTAL ACTIVITIES OF DAILY LIVING (IADL): ICD-10-CM

## 2018-02-28 DIAGNOSIS — R53.1 RIGHT SIDED WEAKNESS: ICD-10-CM

## 2018-02-28 DIAGNOSIS — Z74.09 IMPAIRED MOBILITY AND ACTIVITIES OF DAILY LIVING: Primary | ICD-10-CM

## 2018-02-28 PROCEDURE — 97166 OT EVAL MOD COMPLEX 45 MIN: CPT

## 2018-02-28 PROCEDURE — G8987 SELF CARE CURRENT STATUS: HCPCS

## 2018-02-28 PROCEDURE — G8988 SELF CARE GOAL STATUS: HCPCS

## 2018-02-28 PROCEDURE — 97162 PT EVAL MOD COMPLEX 30 MIN: CPT | Performed by: PHYSICAL THERAPIST

## 2018-03-01 ENCOUNTER — APPOINTMENT (OUTPATIENT)
Dept: PHYSICAL THERAPY | Facility: HOSPITAL | Age: 38
End: 2018-03-01

## 2018-03-01 ENCOUNTER — ANTICOAGULATION VISIT (OUTPATIENT)
Dept: CARDIAC SURGERY | Facility: CLINIC | Age: 38
End: 2018-03-01

## 2018-03-01 ENCOUNTER — APPOINTMENT (OUTPATIENT)
Dept: OCCUPATIONAL THERAPY | Facility: HOSPITAL | Age: 38
End: 2018-03-01

## 2018-03-01 VITALS — DIASTOLIC BLOOD PRESSURE: 72 MMHG | SYSTOLIC BLOOD PRESSURE: 122 MMHG

## 2018-03-01 DIAGNOSIS — E78.5 HYPERLIPIDEMIA, UNSPECIFIED HYPERLIPIDEMIA TYPE: ICD-10-CM

## 2018-03-01 DIAGNOSIS — Z79.01 ANTICOAGULATED ON COUMADIN: ICD-10-CM

## 2018-03-01 LAB — INR PPP: 3.2 (ref 0.9–1.1)

## 2018-03-01 PROCEDURE — 99211 OFF/OP EST MAY X REQ PHY/QHP: CPT | Performed by: NURSE PRACTITIONER

## 2018-03-01 PROCEDURE — 85610 PROTHROMBIN TIME: CPT | Performed by: NURSE PRACTITIONER

## 2018-03-01 RX ORDER — ATORVASTATIN CALCIUM 20 MG/1
80 TABLET, FILM COATED ORAL DAILY
Qty: 90 TABLET | Refills: 3 | Status: CANCELLED | OUTPATIENT
Start: 2018-03-01

## 2018-03-05 ENCOUNTER — HOSPITAL ENCOUNTER (OUTPATIENT)
Dept: OCCUPATIONAL THERAPY | Facility: HOSPITAL | Age: 38
Setting detail: THERAPIES SERIES
Discharge: HOME | End: 2018-03-05

## 2018-03-05 ENCOUNTER — ANTICOAGULATION VISIT (OUTPATIENT)
Dept: CARDIAC SURGERY | Facility: CLINIC | Age: 38
End: 2018-03-05

## 2018-03-05 ENCOUNTER — HOSPITAL ENCOUNTER (OUTPATIENT)
Dept: PHYSICAL THERAPY | Facility: HOSPITAL | Age: 38
Setting detail: THERAPIES SERIES
Discharge: HOME | End: 2018-03-05

## 2018-03-05 VITALS — DIASTOLIC BLOOD PRESSURE: 60 MMHG | HEART RATE: 60 BPM | SYSTOLIC BLOOD PRESSURE: 108 MMHG

## 2018-03-05 DIAGNOSIS — Z79.01 ANTICOAGULATED ON COUMADIN: ICD-10-CM

## 2018-03-05 DIAGNOSIS — Z78.9 IMPAIRED INSTRUMENTAL ACTIVITIES OF DAILY LIVING (IADL): ICD-10-CM

## 2018-03-05 DIAGNOSIS — G45.9 TRANSIENT CEREBRAL ISCHEMIA, UNSPECIFIED TYPE: Primary | ICD-10-CM

## 2018-03-05 DIAGNOSIS — R53.1 WEAKNESS OF RIGHT SIDE OF BODY: ICD-10-CM

## 2018-03-05 DIAGNOSIS — Z74.09 IMPAIRED MOBILITY AND ACTIVITIES OF DAILY LIVING: Primary | ICD-10-CM

## 2018-03-05 DIAGNOSIS — Z78.9 IMPAIRED MOBILITY AND ACTIVITIES OF DAILY LIVING: Primary | ICD-10-CM

## 2018-03-05 DIAGNOSIS — R53.1 RIGHT SIDED WEAKNESS: ICD-10-CM

## 2018-03-05 LAB — INR PPP: 3 (ref 0.9–1.1)

## 2018-03-05 PROCEDURE — 97112 NEUROMUSCULAR REEDUCATION: CPT

## 2018-03-05 PROCEDURE — 99211 OFF/OP EST MAY X REQ PHY/QHP: CPT | Performed by: NURSE PRACTITIONER

## 2018-03-05 PROCEDURE — 97530 THERAPEUTIC ACTIVITIES: CPT

## 2018-03-05 PROCEDURE — 97140 MANUAL THERAPY 1/> REGIONS: CPT

## 2018-03-05 PROCEDURE — 85610 PROTHROMBIN TIME: CPT | Performed by: NURSE PRACTITIONER

## 2018-03-05 NOTE — THERAPY TREATMENT NOTE
Outpatient Occupational Therapy Rehab Program Treatment  [facility]     Patient Name: Frances Motta  : 1980  MRN: [id number]  Today's Date: 3/5/2018        Visit Date: 2018   Visit Number: 2/2  % Improvement: N/A  Recert Date: 2018  MD visit: 2018 with new PCP      Patient Active Problem List   Diagnosis   • Hypertrophic obstructive cardiomyopathy (HOCM)   • Diabetes mellitus   • Diabetes mellitus without complication   • Myopia   • Mixed hyperlipidemia   • Hypothyroidism   • Migraine   • Enlarged heart   • Disease of thyroid gland   • Congestive heart failure   • Asthma   • Anticoagulated on Coumadin        Past Medical History:   Diagnosis Date   • AICD (automatic cardioverter/defibrillator) present    • Anticoagulation goal of INR 2.5 to 3.5    • Asthma    • Cardiomyopathy    • Congestive heart failure    • Diabetes mellitus    • Disease of thyroid gland     hypothyroidism    • Enlarged heart    • Hx of mitral valve replacement with mechanical valve     with thrombus 2017   • Migraine    • TIA (transient ischemic attack)         Past Surgical History:   Procedure Laterality Date   • A-V CARDIAC PACEMAKER INSERTION     • APPENDECTOMY     • ATRIAL CARDIAC PACEMAKER INSERTION     • CARDIAC CATHETERIZATION     • CARDIAC SURGERY     • CARDIAC VALVE SURGERY     • CYSTOSCOPY  2015   • HYSTERECTOMY     • TONSILLECTOMY     • TRANSESOPHAGEAL ECHOCARDIOGRAM (CABRERA)           Visit Dx:    ICD-10-CM ICD-9-CM   1. Impaired mobility and activities of daily living Z74.09 799.89   2. Impaired instrumental activities of daily living (IADL) R53.81 799.3   3. Right sided weakness R53.1 728.87               OT Neuro       18 0800          Subjective Comments    Subjective Comments Pt drove self to therapy this date. Pt reports she has an MD appointment with her new PCP today at 10:45am. Pt reports a new burning pain in RLE 8/10 pain that has been going on since 2018, reported  to PT for further assessment and educated to inform PCP.   -MR      Precautions and Contraindications    Precautions/Limitations fall precautions;lifting restrictions (specify in comments);pacemaker   20# limit; free bleeder  -MR      Subjective Pain    Able to rate subjective pain? yes  -MR      Pre-Treatment Pain Level 4  -MR      Post-Treatment Pain Level 4  -MR      Subjective Pain Comment Right shoulder; pt reported 8/10 pain in RLE  -MR      Cognitive Assessment/Intervention    Current Cognitive/Communication Assessment functional  -MR      Orientation Status oriented x 4  -MR      Follows Commands/Answers Questions 100% of the time;able to follow single-step instructions;needs cueing  -MR      Personal Safety WNL/WFL  -MR      Personal Safety Interventions muscle strengthening facilitated;supervised activity  -MR      Cognition Comments Pt participated in a STM recall task, pt was able to to recall 4/5 words after 5 minutes with mild difficulty noted.   -MR      Sensation    Light Touch Partial deficits in the RUE;Partial deficits in the RLE  -MR      Additional Comments Pt reports sensation has improved in RUE, she states no longer feels like a glove. However pt reports numbness in right thumb and index displaying a concern related to her median nerve.   -MR      Posture/Observations    Alignment Options Rounded shoulders  -MR      Rounded Shoulders Right:;Mild;Sitting posture  -MR      Posture/Observations Comments --   Pt able to correct with VC  -MR      Gross Motor Training    Gross Motor Skill, Impairments Detail Pt completed BUE AROM exercises with medium size lightweight ball, 1 x 10 reps shoulder flexion. Pt completed curls, wrist flexion/extension with 2# dowel jovanni. Pt completed exercises with the power web focused on , digit flexion/extension pt completed 1 x 10 reps holding each for 3 seconds.    -MR      ROM (Range of Motion)    General ROM Detail Pt completed AROM/AAROM in supine with  lightweight dowel jovanni, pt completed shoulder flexion and chest press 1 x 10 reps, pt unable to move through full ROM, unable to touch mat table above head d/t increased pain noted in R shoulder 4/10 and increased tone. Pt completed chest press with lightweight dowel jovanni in supine 1 x 10 with no reported pain. Pt completed shoulder extension and flexion exercises edge of mat with lightweight dowel jovanni 1 x 10 reps.   -MR      Bed Mobility    Bed Mob, Supine to Sit, [place] conditional independence  -MR      Bed Mob, Sit to Supine, [place] conditional independence  -MR      Transfers    Transfers, Sit-Stand [place] conditional independence  -MR      Transfers, Stand-Sit [place] conditional independence  -MR      Transfers, Sit-Stand-Sit, Assist Device --   requires increased time  -MR      Transfer, Safety Issues balance decreased during turns  -MR      Functional Mobility    Functional Mobility- Ind. Level conditional independence   requires increased time  -MR      Functional Mobility-Distance (Feet) --   from lobby to rehab room  -MR        User Key  (r) = Recorded By, (t) = Taken By, (c) = Cosigned By    Initials Name Provider Type     Carolyn TRAYLOR Emmanuel, OT Occupational Therapist                  Therapy Education  Education Details: Pt provided with HEP for B shoulder stretches to be completed each morning in supine. Pt educated on safety within the kitchen during cooking tasks. Pt edcuated to report new pain in RLE to PCP this date.   Given: HEP, Symptoms/condition management, Pain management, Fall prevention and home safety  Program: New  How Provided: Verbal, Demonstration  Provided to: Patient  Level of Understanding: Verbalized, Demonstrated, Teach back education performed          OT Assessment/Plan       03/05/18 0800       OT Assessment    Assessment Comments Pt tolerated OT session well this date. She did reported 4/10 shoulder pain and 8/10 RLE pain with noted swelling in toes per  pt report. Pt was able to recall 4/5 words and completed BUE AROM exercises in all planes with rest breaks as needed. Pt could continue to benefit from skilled OT services to address decreased STM, strength, FMC/GMC, safety awareness, activity tolerance, and independence with ADL's and IADL's. Recommend continue OT POC.   -MR     OT Plan    OT Frequency 2x/week  -MR       User Key  (r) = Recorded By, (t) = Taken By, (c) = Cosigned By    Initials Name Provider Type    MR Carolyn Benitez, OT Occupational Therapist                 OT Goals       03/05/18 0800       OT Short Term Goals    STG Date to Achieve --   by 4 weeks  -MR     STG 1 In order to increase independence with ADL's/IADL's patient will increase RUE  strength scores by 5-10# as noted on 2/3 sessions.   -MR     STG 1 Progress Progressing  -MR     STG 2 In order to increase independence with ADL's/IADL's patient will increase RUE pinch strength scores by 3-5# as noted on 2/3 sessions.   -MR     STG 2 Progress Progressing  -MR     STG 3 Pt will demonstrate ability to recall 3/5 items after 2 minutes to improve functional STM on 2/3 sessions.   -MR     STG 3 Progress Partially Met  -MR     STG 3 Progress Comments Pt recalled 4/5  -MR     STG 4 Pt will participate in sustained ADL/IADL tasks for 20 minutes requiring 2 rest breaks max on 2/3 sessions.   -MR     STG 4 Progress Progressing  -MR     Long Term Goals    LTG Date to Achieve --   by d/c  -MR     LTG 1 Pt will demonstrate/report compliance and independence with progressing HEP on 3/3 sessions.   -MR     LTG 1 Progress Progressing  -MR     LTG 2 In order to increase independence with ADL's/IADL's patient will increase RUE  strength/pinch strength scores to within 90% of WNL based upon age and gender norms as noted on 2/3 sessions.   -MR     LTG 2 Progress Progressing  -MR     LTG 3 Pt will engage in BUE AROM exercises in all planes to increase RUE strength to 4/5 and LUE to 5/5 as noted on  2/3 sessions.    -MR     LTG 3 Progress Progressing  -MR     LTG 4 Pt will demonstrate ability to recall 5/5 items after 5 minutes to improve functional STM on 2/3 sessions.   -MR     LTG 4 Progress Progressing  -MR     LTG 5 Pt will participate in sustained ADL/IADL tasks for 45 minutes requiring 0 rest breaks on 2/3 sessions.   -MR     LTG 5 Progress Progressing  -MR     Time Calculation    OT Goal Re-Cert Due Date 03/28/18  -MR       User Key  (r) = Recorded By, (t) = Taken By, (c) = Cosigned By    Initials Name Provider Type    MR Carolyn Benitez OT Occupational Therapist                              Time Calculation:                       Carolyn Benitez OT  3/5/2018

## 2018-03-05 NOTE — PROGRESS NOTES
Pt states no changes to meds or diet.  No bleeding issues.  Instructed pt on coumadin dosing schedule and will recheck INR this Thursday.  Pt verbalizes.  Patient instructed regarding medication; results given and questions answered. Nutritional counseling given.  Dietary factors affecting therapy addressed.  Patient instructed to monitor for excessive bruising or bleeding.        This document has been electronically signed by JR Knox on March 5, 2018 3:26 PM

## 2018-03-05 NOTE — THERAPY TREATMENT NOTE
Outpatient Physical Therapy Ortho Treatment Note  [facility]     Patient Name: Frances Motta  : 1980  MRN: [id number]  Today's Date: 3/5/2018      Visit Date: 2018  Insurance: [employer]   Visit #:   2/2 (60/yr)   Next MD visit:  3/5/18 - new PCP   Recert Date:   3/21/18         Visit Dx:    ICD-10-CM ICD-9-CM   1. Transient cerebral ischemia, unspecified type G45.9 435.9   2. Weakness of right side of body R53.1 728.87       Patient Active Problem List   Diagnosis   • Hypertrophic obstructive cardiomyopathy (HOCM)   • Diabetes mellitus   • Diabetes mellitus without complication   • Myopia   • Mixed hyperlipidemia   • Hypothyroidism   • Migraine   • Enlarged heart   • Disease of thyroid gland   • Congestive heart failure   • Asthma   • Anticoagulated on Coumadin        Past Medical History:   Diagnosis Date   • AICD (automatic cardioverter/defibrillator) present    • Anticoagulation goal of INR 2.5 to 3.5    • Asthma    • Cardiomyopathy    • Congestive heart failure    • Diabetes mellitus    • Disease of thyroid gland     hypothyroidism    • Enlarged heart    • Hx of mitral valve replacement with mechanical valve     with thrombus 2017   • Migraine    • TIA (transient ischemic attack)         Past Surgical History:   Procedure Laterality Date   • A-V CARDIAC PACEMAKER INSERTION     • APPENDECTOMY     • ATRIAL CARDIAC PACEMAKER INSERTION     • CARDIAC CATHETERIZATION     • CARDIAC SURGERY     • CARDIAC VALVE SURGERY     • CYSTOSCOPY  2015   • HYSTERECTOMY     • TONSILLECTOMY     • TRANSESOPHAGEAL ECHOCARDIOGRAM (CABRERA)                               PT Assessment/Plan       18 0900       PT Assessment    Assessment Comments Pt tolerated session very well and demo'd a significant decrease in radicular Sx/pain throughout and post session.  Session focused on CKC functional activities of R LE and core for increased proprioceptor recruitment and strength training.    -ZAYNAB   "   PT Plan    PT Frequency 2x/week  -     Predicted Duration of Therapy Intervention (days/wks) 4 wks  -     PT Plan Comments Continue POC, manual intervention prn for reducing R LE radicular Sx.  -       User Key  (r) = Recorded By, (t) = Taken By, (c) = Cosigned By    Initials Name Provider Type     Jace Wills, PT Physical Therapist                    Exercises       03/05/18 0800          Subjective Comments    Subjective Comments Pt reports increased R LE radiclar Sx today, burning from hip to foot and also c/o R foot toes feeling swollen.  Pt states she has 1 step to get into house and 1 step to get into garage, but doesn't go into garage often.  -      Subjective Pain    Able to rate subjective pain? yes  -      Pre-Treatment Pain Level 8   4/10 R shld pn, 8/10 R leg pain  -AH      Post-Treatment Pain Level 3  -      Subjective Pain Comment Pt states \"R LE nn symptoms down leg into toes are nearly gone,\" after complete session  -      Aquatics    Aquatics performed? No  -      Exercise 1    Exercise Name 1 Pelvic alignment check  -      Time (Minutes) 1 5  -AH      Additional Comments no slip or rotation noted  -      Exercise 2    Exercise Name 2 Bridging series  -      Cueing 2 Verbal;Tactile  -      Sets 2 1  -AH      Reps 2 5  -AH      Additional Comments 3-way: reg, w/ lat shift, w/ rotation  -      Exercise 3    Exercise Name 3 step matrix R LE CKC  -      Cueing 3 Verbal;Demo  -      Sets 3 1  -AH      Reps 3 5  -AH      Additional Comments II bars: 3-way: fwd/bwd, carioca, rotational  -      Exercise 4    Exercise Name 4 Toe walk&heel walk&carioca walk  -      Cueing 4 Verbal;Demo  -      Sets 4 1  -AH      Reps 4 2  -AH      Additional Comments II bars (length of bars); CGA heel walk  -      Exercise 5    Exercise Name 5 squatted monster walk  -      Cueing 5 Demo  -      Sets 5 1  -AH      Reps 5 2  -AH      Additional Comments fwd/lat in II bars  -AH " "     Exercise 6    Exercise Name 6 6\" step up fwd/lat  -AH      Cueing 6 Demo  -AH      Sets 6 1  -AH      Reps 6 5  -AH      Additional Comments R LE CKC  -AH      Exercise 7    Exercise Name 7 4\" step down fwd  -AH      Cueing 7 Demo  -AH      Sets 7 1  -AH      Reps 7 5  -AH      Additional Comments R LE CKC  -AH      Exercise 8    Exercise Name 8 Seated piriformis stretch R w/ sciatic nn glide ankle pump  -AH      Cueing 8 Demo  -AH      Sets 8 1  -AH      Reps 8 2  -AH      Time (Seconds) 8 30  -AH        User Key  (r) = Recorded By, (t) = Taken By, (c) = Cosigned By    Initials Name Provider Type     Jace Wills, PT Physical Therapist                        Manual Rx (last 36 hours)      Manual Treatments       03/05/18 0900          Manual Rx 1    Manual Rx 1 Location R LE/lumbar  -AH      Manual Rx 1 Type R LE LAD  -AH      Manual Rx 1 Grade gentle-mod  -AH      Manual Rx 1 Duration 5'x2   successful in reducing radicular Sx from 8/10-5/10 alone  -        User Key  (r) = Recorded By, (t) = Taken By, (c) = Cosigned By    Initials Name Provider Type     Jace Wills, PT Physical Therapist                PT OP Goals       03/05/18 0800       PT Short Term Goals    STG Date to Achieve 03/28/18  -     STG 1 Independent in HEP   -     STG 2 Complete 6' walk test with minimal antalgics noted   -     STG 3 CTSIB composite score of 2.20 or better   -     STG 4 Right hip and knee MMT 4+/5 or better  -     Time Calculation    PT Goal Re-Cert Due Date 03/21/18  -       User Key  (r) = Recorded By, (t) = Taken By, (c) = Cosigned By    Initials Name Provider Type     Jace Wills, PT Physical Therapist          Therapy Education  Education Details: HEP progressed for LE/core strength, proprioception, and nn glide/flexibility (see paper chart for copy of printout)  Given: HEP, Symptoms/condition management, Pain management, Posture/body mechanics  Program: Progressed  How Provided: Verbal, " "Demonstration, Written  Provided to: Patient  Level of Understanding: Teach back education performed, Verbalized, Demonstrated    Outcome Measure Options: 5x Sit to Stand  5 Times Sit to Stand  5 Times Sit to Stand (seconds): 11.43 seconds  5 Times Sit to Stand Comments: Norm: 10\"         Time Calculation:   Start Time: 0845  Stop Time: 0935  Time Calculation (min): 50 min  Total Timed Code Minutes- PT: 50 minute(s)    Therapy Charges for Today     Code Description Service Date Service Provider Modifiers Qty    92062452567 HC PT NEUROMUSC RE EDUCATION EA 15 MIN 3/5/2018 Jace Wills, PT GP 1    95888983852 HC PT THERAPEUTIC ACT EA 15 MIN 3/5/2018 Jace Wills, PT GP 1    [id number] HC PT MANUAL THERAPY EA 15 MIN 3/5/2018 Jace Wills, PT GP 1          PT G-Codes  Outcome Measure Options: 5x Sit to Stand         Jace Wills, PT, DPT  3/5/2018     "

## 2018-03-06 ENCOUNTER — APPOINTMENT (OUTPATIENT)
Dept: PHYSICAL THERAPY | Facility: HOSPITAL | Age: 38
End: 2018-03-06

## 2018-03-07 ENCOUNTER — HOSPITAL ENCOUNTER (OUTPATIENT)
Dept: OCCUPATIONAL THERAPY | Facility: HOSPITAL | Age: 38
Setting detail: THERAPIES SERIES
Discharge: HOME | End: 2018-03-07

## 2018-03-07 ENCOUNTER — HOSPITAL ENCOUNTER (OUTPATIENT)
Dept: PHYSICAL THERAPY | Facility: HOSPITAL | Age: 38
Setting detail: THERAPIES SERIES
Discharge: HOME | End: 2018-03-07

## 2018-03-07 DIAGNOSIS — Z78.9 IMPAIRED INSTRUMENTAL ACTIVITIES OF DAILY LIVING (IADL): ICD-10-CM

## 2018-03-07 DIAGNOSIS — R53.1 WEAKNESS OF RIGHT SIDE OF BODY: ICD-10-CM

## 2018-03-07 DIAGNOSIS — Z74.09 IMPAIRED MOBILITY AND ACTIVITIES OF DAILY LIVING: Primary | ICD-10-CM

## 2018-03-07 DIAGNOSIS — R53.1 RIGHT SIDED WEAKNESS: ICD-10-CM

## 2018-03-07 DIAGNOSIS — G45.9 TRANSIENT CEREBRAL ISCHEMIA, UNSPECIFIED TYPE: Primary | ICD-10-CM

## 2018-03-07 DIAGNOSIS — Z78.9 IMPAIRED MOBILITY AND ACTIVITIES OF DAILY LIVING: Primary | ICD-10-CM

## 2018-03-07 PROCEDURE — 97530 THERAPEUTIC ACTIVITIES: CPT

## 2018-03-07 PROCEDURE — 97110 THERAPEUTIC EXERCISES: CPT

## 2018-03-07 PROCEDURE — 97140 MANUAL THERAPY 1/> REGIONS: CPT

## 2018-03-07 PROCEDURE — 97112 NEUROMUSCULAR REEDUCATION: CPT

## 2018-03-07 NOTE — THERAPY TREATMENT NOTE
Outpatient Physical Therapy Ortho Treatment Note  [facility]     Patient Name: Frances Motta  : 1980  MRN: [id number]  Today's Date: 3/7/2018      Visit Date: 2018  Insurance: [employer]   Visit #:   3/3 (60/yr)   Next MD visit:  3/5/18 - new PCP   Recert Date:   3/21/18           Visit Dx:    ICD-10-CM ICD-9-CM   1. Transient cerebral ischemia, unspecified type G45.9 435.9   2. Weakness of right side of body R53.1 728.87       Patient Active Problem List   Diagnosis   • Hypertrophic obstructive cardiomyopathy (HOCM)   • Diabetes mellitus   • Diabetes mellitus without complication   • Myopia   • Mixed hyperlipidemia   • Hypothyroidism   • Migraine   • Enlarged heart   • Disease of thyroid gland   • Congestive heart failure   • Asthma   • Anticoagulated on Coumadin        Past Medical History:   Diagnosis Date   • AICD (automatic cardioverter/defibrillator) present    • Anticoagulation goal of INR 2.5 to 3.5    • Asthma    • Cardiomyopathy    • Congestive heart failure    • Diabetes mellitus    • Disease of thyroid gland     hypothyroidism    • Enlarged heart    • Hx of mitral valve replacement with mechanical valve     with thrombus 2017   • Migraine    • TIA (transient ischemic attack)         Past Surgical History:   Procedure Laterality Date   • A-V CARDIAC PACEMAKER INSERTION     • APPENDECTOMY     • ATRIAL CARDIAC PACEMAKER INSERTION     • CARDIAC CATHETERIZATION     • CARDIAC SURGERY     • CARDIAC VALVE SURGERY     • CYSTOSCOPY  2015   • HYSTERECTOMY     • TONSILLECTOMY     • TRANSESOPHAGEAL ECHOCARDIOGRAM (CABRERA)                               PT Assessment/Plan       18 1000       PT Assessment    Assessment Comments Pt tolerated session very well and responded very well to session interventions.  Noted a reduction in Sx/pain post session again.  Pt required mod cues for CKC ex technique today (step matrix activities).  -     PT Plan    PT Frequency 2x/week   -     Predicted Duration of Therapy Intervention (days/wks) 4 wks  -     PT Plan Comments Continue POC, manual intervention prn for reducing R LE radicular Sx and improving core and LE strength.  -       User Key  (r) = Recorded By, (t) = Taken By, (c) = Cosigned By    Initials Name Provider Type     Jace Wills, PT Physical Therapist                    Exercises       03/07/18 1000          Subjective Comments    Subjective Comments Pt reports Sx are considerably reduced since previous session.    -      Subjective Pain    Able to rate subjective pain? yes  -      Pre-Treatment Pain Level 4  -      Subjective Pain Comment R LE  -AH      Aquatics    Aquatics performed? No  -      Exercise 1    Exercise Name 1 Pelvic alignment check  -      Time (Minutes) 1 3  -      Additional Comments R innom ant rotation noted today  -      Exercise 2    Exercise Name 2 Bridging series  -      Cueing 2 Verbal;Tactile  -      Sets 2 1  -      Reps 2 5  -AH      Additional Comments 5-way: reg, w/ lat shift, w/ rotation, heel bridge, heel bridge w/ ER  -      Exercise 3    Exercise Name 3 step matrix R LE CKC  -      Cueing 3 Verbal;Demo  -      Sets 3 2  -AH      Reps 3 5  -AH      Additional Comments II bars: 3-way: fwd/bwd, carioca, rotational  -      Exercise 4    Exercise Name 4 Toe walk&heel walk&carioca walk  -      Cueing 4 Verbal;Demo  -      Sets 4 1  -AH      Reps 4 4   each  -AH      Additional Comments II bars (length of bars); CGA heel walk  -      Exercise 5    Exercise Name 5 Squat matrix  -      Cueing 5 Demo  -      Sets 5 1  -      Reps 5 12  -AH      Additional Comments 3-way: regular, toes out, toes in  -      Exercise 8    Exercise Name 8 Seated piriformis stretch R w/ sciatic nn glide ankle pump  -      Cueing 8 Demo  -      Sets 8 1  -AH      Reps 8 2  -      Time (Seconds) 8 30  -AH        User Key  (r) = Recorded By, (t) = Taken By, (c) = Cosigned  By    Initials Name Provider Type     Jace Wills, PT Physical Therapist                        Manual Rx (last 36 hours)      Manual Treatments       03/07/18 0900          Manual Rx 1    Manual Rx 1 Location R LE/lumbar  -AH      Manual Rx 1 Type R LE LAD  -AH      Manual Rx 1 Grade gentle-mod  -AH      Manual Rx 1 Duration 5'   successful in reducing radicular Sx from 8/10-5/10 alone  -      Manual Rx 2    Manual Rx 2 Location Pelvis  -      Manual Rx 2 Type SIJ MET for R ant innom rotation correction  -      Manual Rx 2 Grade successful  -      Manual Rx 2 Duration 3'  -        User Key  (r) = Recorded By, (t) = Taken By, (c) = Cosigned By    Initials Name Provider Type     Jace Wills PT Physical Therapist                PT OP Goals       03/07/18 1059 03/07/18 1000    PT Short Term Goals    STG Date to Achieve  03/28/18  -    STG 1  Independent in HEP   -    STG 2  Complete 6' walk test with minimal antalgics noted   -    STG 3  CTSIB composite score of 2.20 or better   -    STG 4  Right hip and knee MMT 4+/5 or better  -    Time Calculation    PT Goal Re-Cert Due Date 03/21/18  -       User Key  (r) = Recorded By, (t) = Taken By, (c) = Cosigned By    Initials Name Provider Type     Jace Wills PT Physical Therapist          Therapy Education  Education Details: no HEP updates today              Time Calculation:   Start Time: 1017  Stop Time: 1100  Time Calculation (min): 43 min  Total Timed Code Minutes- PT: 43 minute(s)    Therapy Charges for Today     Code Description Service Date Service Provider Modifiers Qty    89253152868 HC PT NEUROMUSC RE EDUCATION EA 15 MIN 3/7/2018 Jace Wills, PT GP 1    49227716994 HC PT THERAPEUTIC ACT EA 15 MIN 3/7/2018 Jace Wills, PT GP 1    51453518568 HC PT MANUAL THERAPY EA 15 MIN 3/7/2018 Jace Wills, PT GP 1                    Jace Wills, PT, DPT  3/7/2018

## 2018-03-07 NOTE — THERAPY TREATMENT NOTE
Outpatient Occupational Therapy Rehab Program Treatment  [facility]     Patient Name: Frances Motta  : 1980  MRN: [id number]  Today's Date: 3/7/2018        Visit Date: 2018   Visit Number: 3/3  % Improvement: N/A  Recert Date: 2018  MD visit: N/A      Patient Active Problem List   Diagnosis   • Hypertrophic obstructive cardiomyopathy (HOCM)   • Diabetes mellitus   • Diabetes mellitus without complication   • Myopia   • Mixed hyperlipidemia   • Hypothyroidism   • Migraine   • Enlarged heart   • Disease of thyroid gland   • Congestive heart failure   • Asthma   • Anticoagulated on Coumadin        Past Medical History:   Diagnosis Date   • AICD (automatic cardioverter/defibrillator) present    • Anticoagulation goal of INR 2.5 to 3.5    • Asthma    • Cardiomyopathy    • Congestive heart failure    • Diabetes mellitus    • Disease of thyroid gland     hypothyroidism    • Enlarged heart    • Hx of mitral valve replacement with mechanical valve     with thrombus 2017   • Migraine    • TIA (transient ischemic attack)         Past Surgical History:   Procedure Laterality Date   • A-V CARDIAC PACEMAKER INSERTION     • APPENDECTOMY     • ATRIAL CARDIAC PACEMAKER INSERTION     • CARDIAC CATHETERIZATION     • CARDIAC SURGERY     • CARDIAC VALVE SURGERY     • CYSTOSCOPY  2015   • HYSTERECTOMY     • TONSILLECTOMY     • TRANSESOPHAGEAL ECHOCARDIOGRAM (CABRERA)           Visit Dx:    ICD-10-CM ICD-9-CM   1. Impaired mobility and activities of daily living Z74.09 799.89   2. Impaired instrumental activities of daily living (IADL) R53.81 799.3   3. Right sided weakness R53.1 728.87               OT Neuro       18 1100          Subjective Comments    Subjective Comments Pt drove self to therapy this date. Pt reported MD appt when well with no new concerns. Pt reports RLE pain has improved and right shoulder pain is sitting at 2/10 with 7/10 with shoulder flexion streches in supine, educated  "to d/c if pain persist  -MR      Precautions and Contraindications    Precautions/Limitations fall precautions;lifting restrictions (specify in comments)   20# limit' free bleeder  -MR      Subjective Pain    Able to rate subjective pain? yes  -MR      Pre-Treatment Pain Level 2  -MR      Post-Treatment Pain Level 2  -MR      Subjective Pain Comment Right shoulder, 7/10 during shoulder flexion resolved with rest  -MR      Cognitive Assessment/Intervention    Current Cognitive/Communication Assessment functional  -MR      Orientation Status oriented x 4  -MR      Follows Commands/Answers Questions 100% of the time;able to follow multi-step instructions  -MR      Personal Safety WNL/WFL  -MR      Personal Safety Interventions muscle strengthening facilitated;nonskid shoes/slippers when out of bed;supervised activity  -MR      Sensation    Light Touch Partial deficits in the RUE  -MR      Additional Comments Continues to report tingling in right thumb and index finger, mild this date  -MR      Posture/Observations    Alignment Options Rounded shoulders  -MR      Rounded Shoulders Right:;Mild;Sitting posture  -MR      Posture/Observations Comments Pt able to correct with VC  -MR      Coordination    Coordination Tests Crossing midline  -MR      Crossing Midline Right:;Left:;Intact  -MR      Gross Motor Training    Gross Motor Skill, Impairments Detail Pt completed \"X\" exercise this date with 2# free weight, she demonstrated min difficulty with appropriate movement and coordination making an \"X\" she completed 1 x 10 with no reported pain. Pt complete BUE bicep curls with 2# free wt and chest press, 1 x 10 reps. FMC: pt completed  [place] [facility] [place] this date with ease including tasks focused on pinch strengthening with clothes pins, in-hand manipulation with coinds and stacking dice. Pt had minimal difficult with following patterns and attending to instructions but with increased time was able to complete tasks.  "  -MR      ROM (Range of Motion)    General ROM Detail Pt completed AROM/AAROM of R shoulder with moderate pain noted with shoulder flexion approaching 150*.   -MR      Bed Mobility    Bed Mob, Supine to Sit, [place] conditional independence  -MR      Bed Mob, Sit to Supine, [place] conditional independence  -MR      Transfers    Transfers, Sit-Stand [place] conditional independence  -MR      Transfers, Stand-Sit [place] conditional independence  -MR      Functional Mobility    Functional Mobility- Ind. Level conditional independence  -MR      ADL Assessment/Intervention    IADL Assess/Train, Comment Pt completed a 3-step baking task this date. She required min VC to gather all apporpriate materials, she demonstrated good sequencing abilities. Pt tolerated standing for 15 minutes before requiring a seated recovery period. Pt was able to wash dishes and retrieve hot pan from [employer] ovene with good safety awareness. Pt completed laundry task in standing this date. She fatigued and required a seated recovery, she demosntarted good EC this date.   -MR      Additional Documentation IADL Assess/Train, Comment (Row)  -MR      Balance Skills Training    Standing-Level of Assistance Distant supervision  -MR      Static Standing Balance Support No upper extremity supported  -MR      Standing Balance # of Minutes 15  -MR        User Key  (r) = Recorded By, (t) = Taken By, (c) = Cosigned By    Initials Name Provider Type    MR Carolyn Benitez, OT Occupational Therapist                  Therapy Education  Education Details: Pt provided with gravity assisted AROM for RUE. Pt educated to monitor symptoms and if pain is present to d/c until next OT session.   Given: HEP, Symptoms/condition management  Program: Progressed  How Provided: Demonstration, Written  Provided to: Patient  Level of Understanding: Verbalized, Demonstrated, Teach back education performed          OT Assessment/Plan       03/07/18 1100  03/07/18 1000    OT Assessment    Assessment Comments Pt tolerated OT session well this date. She did reported shoulder pain with HEP from 03/05/2018 advised to modifiy and monitor symptoms if pain persist then d/c. Pt completed cooking task and laundry task with SBA and min VC, demonstrated good safety awareness and EC, ptdid require dincreased time with task followed by seated recovery period. Pt could continue to benefit from skilled OT services to address decreased STM, strength, FMC/GMC, safety awareness, activity tolerance, and independence with ADL's and IADL's. Recommend continue OT POC.   -MR     OT Plan    OT Frequency 2x/week  -MR     Predicted Duration of Therapy Intervention (days/wks)  4 wks  -      User Key  (r) = Recorded By, (t) = Taken By, (c) = Cosigned By    Initials Name Provider Type    MR Carolyn Benitez, OT Occupational Therapist     Jace Wills, PT Physical Therapist                 OT Goals       03/07/18 1100       OT Short Term Goals    STG Date to Achieve --   by 4 weeks  -MR     STG 1 In order to increase independence with ADL's/IADL's patient will increase RUE  strength scores by 5-10# as noted on 2/3 sessions.   -MR     STG 1 Progress Progressing  -MR     STG 2 In order to increase independence with ADL's/IADL's patient will increase RUE pinch strength scores by 3-5# as noted on 2/3 sessions.   -MR     STG 2 Progress Progressing  -MR     STG 3 Pt will demonstrate ability to recall 3/5 items after 2 minutes to improve functional STM on 2/3 sessions.   -MR     STG 3 Progress Partially Met  -MR     STG 4 Pt will participate in sustained ADL/IADL tasks for 20 minutes requiring 2 rest breaks max on 2/3 sessions.   -MR     STG 4 Progress Partially Met  -MR     STG 4 Progress Comments Pt stood for 15 minutes during functiona IADL task and required 1 seated rest break, then completed an additional 7 minute stand  -MR     Long Term Goals    LTG Date to Achieve --   by d/c  -MR      LTG 1 Pt will demonstrate/report compliance and independence with progressing HEP on 3/3 sessions.   -MR     LTG 1 Progress Partially Met  -MR     LTG 2 In order to increase independence with ADL's/IADL's patient will increase RUE  strength/pinch strength scores to within 90% of WNL based upon age and gender norms as noted on 2/3 sessions.   -MR     LTG 2 Progress Progressing  -MR     LTG 3 Pt will engage in BUE AROM exercises in all planes to increase RUE strength to 4/5 and LUE to 5/5 as noted on 2/3 sessions.    -MR     LTG 3 Progress Progressing  -MR     LTG 4 Pt will demonstrate ability to recall 5/5 items after 5 minutes to improve functional STM on 2/3 sessions.   -MR     LTG 4 Progress Progressing  -MR     LTG 5 Pt will participate in sustained ADL/IADL tasks for 45 minutes requiring 0 rest breaks on 2/3 sessions.   -MR     LTG 5 Progress Progressing  -MR     Time Calculation    OT Goal Re-Cert Due Date 03/28/18  -MR       User Key  (r) = Recorded By, (t) = Taken By, (c) = Cosigned By    Initials Name Provider Type     Carolyn Benitez OT Occupational Therapist                              Time Calculation:   OT Start Time: 1100  OT Stop Time: 1156  OT Time Calculation (min): 56 min  Total Timed Code Minutes- OT: 56 minute(s)     Therapy Charges for Today     Code Description Service Date Service Provider Modifiers Qty    11341434430  OT THERAPEUTIC ACT EA 15 MIN 3/7/2018 Carolyn Benitez OT GO 3    81214243456  OT THER PROC EA 15 MIN 3/7/2018 Carolyn Benitez OT GO 1                    Carolyn Benitez OT  3/7/2018

## 2018-03-08 ENCOUNTER — APPOINTMENT (OUTPATIENT)
Dept: OCCUPATIONAL THERAPY | Facility: HOSPITAL | Age: 38
End: 2018-03-08

## 2018-03-08 ENCOUNTER — APPOINTMENT (OUTPATIENT)
Dept: PHYSICAL THERAPY | Facility: HOSPITAL | Age: 38
End: 2018-03-08

## 2018-03-08 ENCOUNTER — ANTICOAGULATION VISIT (OUTPATIENT)
Dept: CARDIAC SURGERY | Facility: CLINIC | Age: 38
End: 2018-03-08

## 2018-03-08 VITALS — HEART RATE: 68 BPM | SYSTOLIC BLOOD PRESSURE: 103 MMHG | DIASTOLIC BLOOD PRESSURE: 68 MMHG

## 2018-03-08 DIAGNOSIS — Z79.01 ANTICOAGULATED ON COUMADIN: ICD-10-CM

## 2018-03-08 LAB — INR PPP: 2.5 (ref 0.9–1.1)

## 2018-03-08 PROCEDURE — 85610 PROTHROMBIN TIME: CPT | Performed by: NURSE PRACTITIONER

## 2018-03-08 NOTE — PROGRESS NOTES
PT states compliant c tx. PT denies any new medications or bleeding issues. PT denies any s/s of blood clot. Pt instructed to consume usual diet and to take 10mg daily due to drop in INR since Monday. Patient instructed regarding medication; results given and questions answered. Nutritional counseling given.  Dietary factors affecting therapy addressed.  Patient instructed to monitor for excessive bruising or bleeding. PT verbalizes understanding.         This document has been electronically signed by JR Knox on March 8, 2018 3:32 PM

## 2018-03-12 ENCOUNTER — HOSPITAL ENCOUNTER (OUTPATIENT)
Dept: OCCUPATIONAL THERAPY | Facility: HOSPITAL | Age: 38
Setting detail: THERAPIES SERIES
Discharge: HOME | End: 2018-03-12

## 2018-03-12 ENCOUNTER — HOSPITAL ENCOUNTER (OUTPATIENT)
Dept: PHYSICAL THERAPY | Facility: HOSPITAL | Age: 38
Setting detail: THERAPIES SERIES
Discharge: HOME | End: 2018-03-12

## 2018-03-12 ENCOUNTER — ANTICOAGULATION VISIT (OUTPATIENT)
Dept: CARDIAC SURGERY | Facility: CLINIC | Age: 38
End: 2018-03-12

## 2018-03-12 VITALS — HEART RATE: 60 BPM | OXYGEN SATURATION: 99 %

## 2018-03-12 DIAGNOSIS — Z74.09 IMPAIRED MOBILITY AND ACTIVITIES OF DAILY LIVING: Primary | ICD-10-CM

## 2018-03-12 DIAGNOSIS — Z78.9 IMPAIRED MOBILITY AND ACTIVITIES OF DAILY LIVING: Primary | ICD-10-CM

## 2018-03-12 DIAGNOSIS — R53.1 RIGHT SIDED WEAKNESS: ICD-10-CM

## 2018-03-12 DIAGNOSIS — Z79.01 ANTICOAGULATED ON COUMADIN: ICD-10-CM

## 2018-03-12 DIAGNOSIS — G45.9 TRANSIENT CEREBRAL ISCHEMIA, UNSPECIFIED TYPE: Primary | ICD-10-CM

## 2018-03-12 DIAGNOSIS — R53.1 WEAKNESS OF RIGHT SIDE OF BODY: ICD-10-CM

## 2018-03-12 DIAGNOSIS — Z78.9 IMPAIRED INSTRUMENTAL ACTIVITIES OF DAILY LIVING (IADL): ICD-10-CM

## 2018-03-12 LAB — INR PPP: 3.1 (ref 0.9–1.1)

## 2018-03-12 PROCEDURE — 85610 PROTHROMBIN TIME: CPT | Performed by: NURSE PRACTITIONER

## 2018-03-12 PROCEDURE — 97110 THERAPEUTIC EXERCISES: CPT | Performed by: PHYSICAL THERAPIST

## 2018-03-12 PROCEDURE — 97110 THERAPEUTIC EXERCISES: CPT

## 2018-03-12 PROCEDURE — 99211 OFF/OP EST MAY X REQ PHY/QHP: CPT | Performed by: NURSE PRACTITIONER

## 2018-03-12 NOTE — THERAPY TREATMENT NOTE
Outpatient Occupational Therapy Rehab Program Treatment  [facility]     Patient Name: Frances Motta  : 1980  MRN: [id number]  Today's Date: 3/12/2018        Visit Date: 2018    Patient Active Problem List   Diagnosis   • Hypertrophic obstructive cardiomyopathy (HOCM)   • Diabetes mellitus   • Diabetes mellitus without complication   • Myopia   • Mixed hyperlipidemia   • Hypothyroidism   • Migraine   • Enlarged heart   • Disease of thyroid gland   • Congestive heart failure   • Asthma   • Anticoagulated on Coumadin        Past Medical History:   Diagnosis Date   • AICD (automatic cardioverter/defibrillator) present    • Anticoagulation goal of INR 2.5 to 3.5    • Asthma    • Cardiomyopathy    • Congestive heart failure    • Diabetes mellitus    • Disease of thyroid gland     hypothyroidism    • Enlarged heart    • Hx of mitral valve replacement with mechanical valve     with thrombus 2017   • Migraine    • TIA (transient ischemic attack)         Past Surgical History:   Procedure Laterality Date   • A-V CARDIAC PACEMAKER INSERTION     • APPENDECTOMY     • ATRIAL CARDIAC PACEMAKER INSERTION     • CARDIAC CATHETERIZATION     • CARDIAC SURGERY     • CARDIAC VALVE SURGERY     • CYSTOSCOPY  2015   • HYSTERECTOMY     • TONSILLECTOMY     • TRANSESOPHAGEAL ECHOCARDIOGRAM (CABRERA)           Visit Dx:    ICD-10-CM ICD-9-CM   1. Impaired mobility and activities of daily living Z74.09 799.89   2. Impaired instrumental activities of daily living (IADL) R53.81 799.3   3. Right sided weakness R53.1 728.87               OT Neuro     Row Name 18 1350             Subjective Comments    Subjective Comments Pt here after PT session in which she reports PT is discharging this date and will allow her to workout in the fitness facility. Pt is driving independently and reports she drove herself to therapy today. Pt did not report any upcoming MD appointments.   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 yes  -BL      Pre-Treatment Pain Level 2  -BL      Post-Treatment Pain Level 4  -BL      Subjective Pain Comment R shoulder near bicep  -BL         Cognitive Assessment/Intervention    Current Cognitive/Communication Assessment functional  -BL      Orientation Status (Cognition) oriented x 4  -BL      Follows Commands (Cognition) WNL  -BL      Personal Safety WNL/WFL  -BL      Personal Safety Interventions fall prevention program maintained;muscle strengthening facilitated;nonskid shoes/slippers when out of bed  -BL         Sensation    Additional Comments Did not assess sensation this date  -BL         Proprioception    Proprioception Pt participated in proprioceptive awareness task with RUE demonstrating WFL of RUE; LUE not tested.  -BL         Posture/Observations    Alignment Options Rounded shoulders  -BL      Rounded Shoulders Bilateral:;Mild;Sitting posture  -BL      Posture/Observations Comments Pt was noted to have rounded shoulders and increased tightness located at B pecs this date. Pt was educated on stretches in supine with dowel jovanni and slow pulley exercises to be completed at home. Pt was given pulleys this date to complete as part of her HEP to begin with MH prior to any stretches in the morning and complete 1 -2 times a day as needed.   -BL         Gross Motor Training    Gross Motor Skill, Impairments Detail Pt participated in GM task in supine and sitting with dowel jovanni and no weights to educate on proper shoulder positioning during stretches and exercises. Pt was able to demo slow progressive tolerance for dowel in supine with vc's given to accomadate and slowly complete shoulder flexion exercises. Pt completed chest presses with no c/o pain this date.   -BL          General ROM    GENERAL ROM COMMENTS RUE shoulder PROM gentle to progressive mild was completed this date in supine for joint mobilization, shoulder flexion, shoulder IR/ER, and horizontal abduction/adduction with most c/o pain in extension overall. Pt tolerated 10 reps in each plane on RUE. Pt tolerated cupping method this date for soft tissue managment and releasing of noted bicep nodule located near RUE bicep muscle belly. Pt tolerated 5 minutes total of cupping along bicep muscle vertically and pinpointing the exact knot with rest breaks at each minute. Noteable decrease in overall muscle knotting was seen this date after cupping tool was used. Will proceed with caution as pt does bruise and bleed easily.  No bruises were present after cupping session this date.   -BL        User Key  (r) = Recorded By, (t) = Taken By, (c) = Cosigned By    Initials Name Provider Type    KAVITA Howard Occupational Therapy Assistant                  Therapy Education  Education Details: Educated slightly on new modified HEP with dowel jovanni and to apply MH prior to any stretches at home. Pt was provided with set of pulleys for home to use 1-2 times a day if tolerated.  Given: HEP, Symptoms/condition management  Program: Modified  How Provided: Demonstration, Written  Provided to: Patient  Level of Understanding: Verbalized, Demonstrated, Teach back education performed          OT Assessment/Plan     Row Name 03/12/18 1350 03/12/18 1300       OT Assessment    Assessment Comments OT treatment tolerated very well this date with minimal c/o pain throughout PROM to RUE and during cupping of R bicep muscle belly. Pt was able to tolerate PROM in all planes demonstrating WFL AROM however most c/o pain during shoulder extension. Pt will benefit from skilled OT to address strength deficits and to educate pt on proper body  mechanics for lifting to refrain from straining R bicep tendon/muscle.  -BL  --       OT Plan    OT  Frequency 2x/week  -BL  --    Predicted Duration of Therapy Intervention (OT Eval)  -- Discharge   -BB      User Key  (r) = Recorded By, (t) = Taken By, (c) = Cosigned By    Initials Name Provider Type    SAY Farr, PT Physical Therapist    SIOMARA Howard/DICK Occupational Therapy Assistant                 OT Goals     Row Name 03/12/18 1341          OT Short Term Goals    STG Date to Achieve --   by 4 weeks  -     STG 1 In order to increase independence with ADL's/IADL's patient will increase RUE  strength scores by 5-10# as noted on 2/3 sessions.   -BL     STG 1 Progress Progressing  -BL     STG 2 In order to increase independence with ADL's/IADL's patient will increase RUE pinch strength scores by 3-5# as noted on 2/3 sessions.   -BL     STG 2 Progress Progressing  -BL     STG 3 Pt will demonstrate ability to recall 3/5 items after 2 minutes to improve functional STM on 2/3 sessions.   -     STG 3 Progress Partially Met  -BL     STG 4 Pt will participate in sustained ADL/IADL tasks for 20 minutes requiring 2 rest breaks max on 2/3 sessions.   -     STG 4 Progress Partially Met  -BL        Long Term Goals    LTG Date to Achieve --   by d/c  -BL     LTG 1 Pt will demonstrate/report compliance and independence with progressing HEP on 3/3 sessions.   -BL     LTG 1 Progress Partially Met  -BL     LTG 2 In order to increase independence with ADL's/IADL's patient will increase RUE  strength/pinch strength scores to within 90% of WNL based upon age and gender norms as noted on 2/3 sessions.   -BL     LTG 2 Progress Progressing  -BL     LTG 3 Pt will engage in BUE AROM exercises in all planes to increase RUE strength to 4/5 and LUE to 5/5 as noted on 2/3 sessions.    -     LTG 3 Progress Progressing  -BL     LTG 4 Pt will demonstrate ability to recall 5/5 items after 5 minutes to improve functional STM on 2/3 sessions.   -     LTG 4 Progress Progressing  -BL     LTG 5 Pt will participate in  sustained ADL/IADL tasks for 45 minutes requiring 0 rest breaks on 2/3 sessions.   -BL     LTG 5 Progress Progressing  -BL        Time Calculation    OT Goal Re-Cert Due Date 03/28/18  -       User Key  (r) = Recorded By, (t) = Taken By, (c) = Cosigned By    Initials Name Provider Type     MELLO CornejoA/DICK Occupational Therapy Assistant                              Time Calculation:   OT Start Time: 1350  OT Stop Time: 1430  OT Time Calculation (min): 40 min  Total Timed Code Minutes- OT: 40 minute(s)     Therapy Charges for Today     Code Description Service Date Service Provider Modifiers Qty    94833415158 HC OT THER PROC EA 15 MIN 3/12/2018 MELLO CornejoA/L GO 3                    SIOMARA Cronejo/DICK  3/12/2018

## 2018-03-12 NOTE — PROGRESS NOTES
PT states she took Coumadin and ate green as directed. PT denies any new medications or bleeding issues. PT denies any s/s of blood clot. Due to pt's INR dropping after 7.5mg dose last week, pt was instructed to take 10mg daily even though she is therapeutic. Pt will continue usual green vegs intake and will be seen in [facility] on Thursday. Patient instructed regarding medication; results given and questions answered. Nutritional counseling given.  Dietary factors affecting therapy addressed.  Patient instructed to monitor for excessive bruising or bleeding. PT verbalizes understanding.           This document has been electronically signed by JR Knox on March 12, 2018 3:31 PM

## 2018-03-12 NOTE — THERAPY DISCHARGE NOTE
Outpatient Physical Therapy Ortho Progress Note/Discharge Summary  [facility]     Patient Name: Frances Motta  : 1980  MRN: [id number]  Today's Date: 3/12/2018      Visit Date: 2018  Attendance: 4/5  Subjective % Improvement: 75%  Recert Date: Discharge   MD appointment: TBD    Therapy Diagnosis: TIA with right side weakness    Visit Dx:    ICD-10-CM ICD-9-CM   1. Transient cerebral ischemia, unspecified type G45.9 435.9   2. Weakness of right side of body R53.1 728.87       Patient Active Problem List   Diagnosis   • Hypertrophic obstructive cardiomyopathy (HOCM)   • Diabetes mellitus   • Diabetes mellitus without complication   • Myopia   • Mixed hyperlipidemia   • Hypothyroidism   • Migraine   • Enlarged heart   • Disease of thyroid gland   • Congestive heart failure   • Asthma   • Anticoagulated on Coumadin        Past Medical History:   Diagnosis Date   • AICD (automatic cardioverter/defibrillator) present    • Anticoagulation goal of INR 2.5 to 3.5    • Asthma    • Cardiomyopathy    • Congestive heart failure    • Diabetes mellitus    • Disease of thyroid gland     hypothyroidism    • Enlarged heart    • Hx of mitral valve replacement with mechanical valve     with thrombus 2017   • Migraine    • TIA (transient ischemic attack)         Past Surgical History:   Procedure Laterality Date   • A-V CARDIAC PACEMAKER INSERTION     • APPENDECTOMY     • ATRIAL CARDIAC PACEMAKER INSERTION     • CARDIAC CATHETERIZATION     • CARDIAC SURGERY     • CARDIAC VALVE SURGERY     • CYSTOSCOPY  2015   • HYSTERECTOMY     • TONSILLECTOMY     • TRANSESOPHAGEAL ECHOCARDIOGRAM (CABRERA)               PT Ortho     Row Name 18 1300       Precautions and Contraindications    Precautions/Limitations fall precautions  -BB    Precautions Pacemaker/Defib., Hx of TIA   -BB       Subjective Pain    Post-Treatment Pain Level 0  -BB       Posture/Observations    Posture/Observations Comments No acute  "distress. No antalgic gait.   -BB       General Assessment (Manual Muscle Testing)    General Manual Muscle Testing (MMT) Assessment no strength deficits identified  -BB    Comment, General Manual Muscle Testing (MMT) Assessment All BLE strength WNL   -BB       Balance Skills Training    SLS BLE able SLS 4\" level ground no assistance   -BB    Rhomberg Independent with no sway idol. Mild sway with pertubations   -BB    Sharpened Rhomberg bilateral 3\" independent   -BB    Balance Comments Composite score of 1.14   -BB       Transfers    Comment (Transfers) Independent in all transfers   -BB       Gait/Stairs Assessment/Training    Comment (Gait/Stairs) No antalgics. No assistive device.   -BB      User Key  (r) = Recorded By, (t) = Taken By, (c) = Cosigned By    Initials Name Provider Type    BB Deb Farr, PT Physical Therapist                            PT Assessment/Plan     Row Name 03/12/18 1300          PT Assessment    Assessment Comments Patient presents meeting all goals today. Patient has progressed well with strength and balance and is ready to be independent with HEP. Patient continues to have mild balance deficits.   -BB     Patient/caregiver participated in establishment of treatment plan and goals Yes  -BB        PT Plan    Predicted Duration of Therapy Intervention (OT Eval) Discharge   -BB     PT Plan Comments Discharge   -BB       User Key  (r) = Recorded By, (t) = Taken By, (c) = Cosigned By    Initials Name Provider Type    SAY Farr, PT Physical Therapist                    Exercises     Row Name 03/12/18 1300             Subjective Comments    Subjective Comments Reports feeling a lot better. Reports being about to start of a healthy diet. Reports feeling stronger and not needing a cane anymore. Denies any medication changes or any PMH changes. Denies any new falls. Reports occasionally feeling off balance. Occasional burning sensation in the leg that seems to be improved since PT and "  has been stretching on her/   -BB         Subjective Pain    Able to rate subjective pain? yes  -BB      Pre-Treatment Pain Level 0  -BB      Post-Treatment Pain Level 0  -BB         Exercise 1    Exercise Name 1 Recheck with MMT, balance  -BB         Exercise 2    Exercise Name 2 CTSIB testing   -BB      Time 2 5'  -BB         Exercise 3    Exercise Name 3 Pro 2 Seat 8 UE/LE   -BB      Time 3 10  -BB         Exercise 4    Exercise Name 4 sitting hip abd cybex  -BB      Sets 4 2  -BB      Reps 4 10  -BB      Additional Comments 15#   -BB         Exercise 5    Exercise Name 5 cybex sitting hip add   -BB      Sets 5 2  -BB      Reps 5 10  -BB      Additional Comments 20#  -BB         Exercise 6    Exercise Name 6 Cybex double leg press   -BB      Sets 6 2  -BB      Reps 6 10  -BB      Additional Comments 80#  -BB         Exercise 7    Exercise Name 7 6' walk test   -BB      Time 7 6'  -BB        User Key  (r) = Recorded By, (t) = Taken By, (c) = Cosigned By    Initials Name Provider Type    SAY Farr PT Physical Therapist                               PT OP Goals     Row Name 03/12/18 1300          PT Short Term Goals    STG Date to Achieve --  -BB     STG 1 Independent in HEP   -BB     STG 1 Progress Met  -BB     STG 2 Complete 6' walk test with minimal antalgics noted   -BB     STG 2 Progress Met  -BB     STG 3 CTSIB composite score of 2.20 or better   -BB     STG 3 Progress Met  -BB     STG 3 Progress Comments Composite score of 1.14   -BB     STG 4 Right hip and knee MMT 4+/5 or better  -BB     STG 4 Progress Met  -BB       User Key  (r) = Recorded By, (t) = Taken By, (c) = Cosigned By    Initials Name Provider Type    SAY Farr PT Physical Therapist                         Time Calculation:   Start Time: 1305  Stop Time: 1356  Time Calculation (min): 51 min  Total Timed Code Minutes- PT: 47 minute(s)    Therapy Charges for Today     Code Description Service Date Service Provider Modifiers  Qty    [id number]  PT THER PROC EA 15 MIN 3/12/2018 Deb Farr, PT GP 3                OP PT Discharge Summary  Date of Discharge: 03/12/18  Reason for Discharge: All goals achieved, Independent  Outcomes Achieved: Able to achieve all goals within established timeline  Discharge Destination: Home with home program  Discharge Instructions/Additional Comments: Continue 1 month fitness [employer] memebership      Deb Farr, PT  3/12/2018

## 2018-03-14 ENCOUNTER — APPOINTMENT (OUTPATIENT)
Dept: PHYSICAL THERAPY | Facility: HOSPITAL | Age: 38
End: 2018-03-14

## 2018-03-14 ENCOUNTER — HOSPITAL ENCOUNTER (OUTPATIENT)
Dept: OCCUPATIONAL THERAPY | Facility: HOSPITAL | Age: 38
Setting detail: THERAPIES SERIES
Discharge: HOME | End: 2018-03-14

## 2018-03-14 DIAGNOSIS — Z78.9 IMPAIRED MOBILITY AND ACTIVITIES OF DAILY LIVING: Primary | ICD-10-CM

## 2018-03-14 DIAGNOSIS — Z78.9 IMPAIRED INSTRUMENTAL ACTIVITIES OF DAILY LIVING (IADL): ICD-10-CM

## 2018-03-14 DIAGNOSIS — R53.1 RIGHT SIDED WEAKNESS: ICD-10-CM

## 2018-03-14 DIAGNOSIS — Z74.09 IMPAIRED MOBILITY AND ACTIVITIES OF DAILY LIVING: Primary | ICD-10-CM

## 2018-03-14 PROCEDURE — 97110 THERAPEUTIC EXERCISES: CPT

## 2018-03-14 PROCEDURE — 97530 THERAPEUTIC ACTIVITIES: CPT

## 2018-03-14 NOTE — THERAPY TREATMENT NOTE
"Outpatient Occupational Therapy Rehab Program Treatment  [facility]     Patient Name: Frances Motta  : 1980  MRN: [id number]  Today's Date: 3/14/2018        Visit Date: 2018  Visit Number: 3/3 Recert Date: 3/28/2018 % Improvement: N/A         MD Visit Date: N/A    Total Insurance visit approved: 30       Patient Active Problem List   Diagnosis   • Hypertrophic obstructive cardiomyopathy (HOCM)   • Diabetes mellitus   • Diabetes mellitus without complication   • Myopia   • Mixed hyperlipidemia   • Hypothyroidism   • Migraine   • Enlarged heart   • Disease of thyroid gland   • Congestive heart failure   • Asthma   • Anticoagulated on Coumadin        Past Medical History:   Diagnosis Date   • AICD (automatic cardioverter/defibrillator) present    • Anticoagulation goal of INR 2.5 to 3.5    • Asthma    • Cardiomyopathy    • Congestive heart failure    • Diabetes mellitus    • Disease of thyroid gland     hypothyroidism    • Enlarged heart    • Hx of mitral valve replacement with mechanical valve     with thrombus 2017   • Migraine    • TIA (transient ischemic attack)         Past Surgical History:   Procedure Laterality Date   • A-V CARDIAC PACEMAKER INSERTION     • APPENDECTOMY     • ATRIAL CARDIAC PACEMAKER INSERTION     • CARDIAC CATHETERIZATION     • CARDIAC SURGERY     • CARDIAC VALVE SURGERY     • CYSTOSCOPY  2015   • HYSTERECTOMY     • TONSILLECTOMY     • TRANSESOPHAGEAL ECHOCARDIOGRAM (CABRERA)           Visit Dx:    ICD-10-CM ICD-9-CM   1. Impaired mobility and activities of daily living Z74.09 799.89   2. Impaired instrumental activities of daily living (IADL) R53.81 799.3   3. Right sided weakness R53.1 728.87               OT Neuro     Row Name 18 0935             Subjective Comments    Subjective Comments Pt here stating overall she feels very well improved since inital TIA and is completing all HEP's as directed. Pt reports her R shoulder was feeling \"fine\" after last " treatment session with STRINGER/L then she was able to complete her HEP with minimal c/o discomfort. Heavily reveiwed this date HEP and proper ways for lifting. No upcoming MD appointments. P  -BL         Precautions and Contraindications    Precautions/Limitations lifting restrictions (specify in comments)  -BL      Precautions 20 # lifting restriction; pt is a free bleeder  -BL         Subjective Pain    Able to rate subjective pain? yes  -BL      Pre-Treatment Pain Level 4  -BL      Post-Treatment Pain Level 5  -BL         Cognitive Assessment/Intervention    Current Cognitive/Communication Assessment functional  -BL      Orientation Status (Cognition) oriented x 4  -BL      Follows Commands (Cognition) WNL  -BL      Personal Safety WNL/WFL  -BL         Posture/Observations    Alignment Options Rounded shoulders  -BL      Rounded Shoulders Bilateral:;Mild  -BL      Posture/Observations Comments Pt was educated on proper positioning of shoulders to decrease pain in R shoulder. Pt was educated on kinesiotaping and effects of proper posture on overall body mechanics. Test strip applied to R upper extremity near elbow to ensure no allergic reactions.   -BL         Coordination    Coordination Tests Rapid Alternating;Bilateral integration  -BL      Rapid Alternating Bilteral:;Intact  -BL      Crossing Midline Bilteral:;Intact  -BL         Gross Motor Training    Gross Motor Skill, Impairments Detail Pt completed sitting and standing wall stretches x 10 reps with increased pain 3/10 with rest breaks as needed. Pt was able to tolerate towel stretches for shoudler extension x 10 reps with increased difficulty however tolerated fair. Pt was given updated hep for towel stretches, wall stretches, and corner stretches. Pt completd corner stretches x 5 reps holding each 5 seconds with good tolerance and stating she felt decreased pain overall.   -BL         General ROM    GENERAL ROM COMMENTS MH was applied prior to shoulder PROM  in supine this date. P t tolerated PROM to shoulder in flexion, IR/ER, and abduction with most c/o pain in abduction and flexion only able to achieve 90* of each this date. Pt was noted to have increased stiffness in R shoulder and increased pain at beginning of session.   -BL        User Key  (r) = Recorded By, (t) = Taken By, (c) = Cosigned By    Initials Name Provider Type    SIOMARA Howard/DICK Occupational Therapy Assistant                  Therapy Education  Given: HEP, Pain management, Posture/body mechanics  Program: New  How Provided: Verbal, Demonstration, Written  Provided to: Patient  Level of Understanding: Teach back education performed, Verbalized, Demonstrated          OT Assessment/Plan     Row Name 03/14/18 1000 03/14/18 0935       OT Assessment    Assessment Comments  -- Overall OT treatment tolerated well this date with increased pain and stiffness noted at pre session however decreased stiffness after PROM and A/AROM/AROM exercises and stretches. Pt was given updated HEP with pictures provided this date. Pt was educated to consult MD about R shoulder if pain persists and does not improve.  -BL       OT Plan    OT Frequency 2x/week  -BL 2x/week  -BL      User Key  (r) = Recorded By, (t) = Taken By, (c) = Cosigned By    Initials Name Provider Type     SIOMARA Cornejo/DICK Occupational Therapy Assistant                 OT Goals     Row Name 03/14/18 0935          OT Short Term Goals    STG Date to Achieve --   by 4 weeks  -     STG 1 In order to increase independence with ADL's/IADL's patient will increase RUE  strength scores by 5-10# as noted on 2/3 sessions.   -BL     STG 1 Progress Progressing  -     STG 2 In order to increase independence with ADL's/IADL's patient will increase RUE pinch strength scores by 3-5# as noted on 2/3 sessions.   -BL     STG 2 Progress Progressing  -     STG 3 Pt will demonstrate ability to recall 3/5 items after 2 minutes to improve functional STM  on 2/3 sessions.   -BL     STG 3 Progress Partially Met  -BL     STG 4 Pt will participate in sustained ADL/IADL tasks for 20 minutes requiring 2 rest breaks max on 2/3 sessions.   -BL     STG 4 Progress Partially Met  -        Long Term Goals    LTG Date to Achieve --   by d/c  -BL     LTG 1 Pt will demonstrate/report compliance and independence with progressing HEP on 3/3 sessions.   -BL     LTG 1 Progress Partially Met  -BL     LTG 2 In order to increase independence with ADL's/IADL's patient will increase RUE  strength/pinch strength scores to within 90% of WNL based upon age and gender norms as noted on 2/3 sessions.   -BL     LTG 2 Progress Progressing  -BL     LTG 3 Pt will engage in BUE AROM exercises in all planes to increase RUE strength to 4/5 and LUE to 5/5 as noted on 2/3 sessions.    -     LTG 3 Progress Progressing  -BL     LTG 4 Pt will demonstrate ability to recall 5/5 items after 5 minutes to improve functional STM on 2/3 sessions.   -     LTG 4 Progress Progressing  -BL     LTG 5 Pt will participate in sustained ADL/IADL tasks for 45 minutes requiring 0 rest breaks on 2/3 sessions.   -     LTG 5 Progress Progressing  -BL        Time Calculation    OT Goal Re-Cert Due Date 03/28/18  -       User Key  (r) = Recorded By, (t) = Taken By, (c) = Cosigned By    Initials Name Provider Type    BL Juliette Sheets STRINGER/L Occupational Therapy Assistant                              Time Calculation:   OT Start Time: 0936  OT Stop Time: 1031  OT Time Calculation (min): 55 min  Total Timed Code Minutes- OT: 55 minute(s)     Therapy Charges for Today     Code Description Service Date Service Provider Modifiers Qty    31864660292 HC OT THER PROC EA 15 MIN 3/14/2018 Juliette Sheets STRINGER/L GO 3    28899226602 HC OT THERAPEUTIC ACT EA 15 MIN 3/14/2018 Juliette Sheets, STRINGER/L GO 1                    Juliette Sheets STRINGER/L  3/14/2018

## 2018-03-15 ENCOUNTER — LAB (OUTPATIENT)
Dept: ONCOLOGY | Facility: HOSPITAL | Age: 38
End: 2018-03-15

## 2018-03-15 ENCOUNTER — OFFICE VISIT (OUTPATIENT)
Dept: ONCOLOGY | Facility: CLINIC | Age: 38
End: 2018-03-15

## 2018-03-15 ENCOUNTER — ANTICOAGULATION VISIT (OUTPATIENT)
Dept: CARDIAC SURGERY | Facility: CLINIC | Age: 38
End: 2018-03-15

## 2018-03-15 VITALS
WEIGHT: 209 LBS | RESPIRATION RATE: 18 BRPM | BODY MASS INDEX: 33.59 KG/M2 | HEART RATE: 59 BPM | HEIGHT: 66 IN | DIASTOLIC BLOOD PRESSURE: 72 MMHG | SYSTOLIC BLOOD PRESSURE: 120 MMHG | TEMPERATURE: 98.9 F

## 2018-03-15 VITALS — SYSTOLIC BLOOD PRESSURE: 110 MMHG | DIASTOLIC BLOOD PRESSURE: 78 MMHG | HEART RATE: 72 BPM

## 2018-03-15 DIAGNOSIS — Z79.01 ANTICOAGULATED ON COUMADIN: Primary | ICD-10-CM

## 2018-03-15 DIAGNOSIS — Z79.01 ANTICOAGULATED ON COUMADIN: ICD-10-CM

## 2018-03-15 LAB
ALBUMIN SERPL-MCNC: 4 G/DL (ref 3.4–4.8)
ALBUMIN/GLOB SERPL: 1.3 G/DL (ref 1.1–1.8)
ALP SERPL-CCNC: 65 U/L (ref 38–126)
ALT SERPL W P-5'-P-CCNC: 46 U/L (ref 9–52)
ANION GAP SERPL CALCULATED.3IONS-SCNC: 14 MMOL/L (ref 5–15)
AST SERPL-CCNC: 39 U/L (ref 14–36)
BASOPHILS # BLD AUTO: 0.03 10*3/MM3 (ref 0–0.2)
BASOPHILS NFR BLD AUTO: 0.2 % (ref 0–2)
BILIRUB SERPL-MCNC: 0.4 MG/DL (ref 0.2–1.3)
BUN BLD-MCNC: 15 MG/DL (ref 7–21)
BUN/CREAT SERPL: 17.9 (ref 7–25)
CALCIUM SPEC-SCNC: 9.1 MG/DL (ref 8.4–10.2)
CHLORIDE SERPL-SCNC: 104 MMOL/L (ref 95–110)
CO2 SERPL-SCNC: 24 MMOL/L (ref 22–31)
CREAT BLD-MCNC: 0.84 MG/DL (ref 0.5–1)
DEPRECATED RDW RBC AUTO: 41.9 FL (ref 36.4–46.3)
EOSINOPHIL # BLD AUTO: 0.17 10*3/MM3 (ref 0–0.7)
EOSINOPHIL NFR BLD AUTO: 1.3 % (ref 0–7)
ERYTHROCYTE [DISTWIDTH] IN BLOOD BY AUTOMATED COUNT: 14 % (ref 11.5–14.5)
GFR SERPL CREATININE-BSD FRML MDRD: 75 ML/MIN/1.73 (ref 64–149)
GLOBULIN UR ELPH-MCNC: 3 GM/DL (ref 2.3–3.5)
GLUCOSE BLD-MCNC: 164 MG/DL (ref 60–100)
HCT VFR BLD AUTO: 37.6 % (ref 35–45)
HGB BLD-MCNC: 12.4 G/DL (ref 12–15.5)
IMM GRANULOCYTES # BLD: 0.04 10*3/MM3 (ref 0–0.02)
IMM GRANULOCYTES NFR BLD: 0.3 % (ref 0–0.5)
INR PPP: 2.6 (ref 0.9–1.1)
LYMPHOCYTES # BLD AUTO: 1.89 10*3/MM3 (ref 0.6–4.2)
LYMPHOCYTES NFR BLD AUTO: 14 % (ref 10–50)
MCH RBC QN AUTO: 27.2 PG (ref 26.5–34)
MCHC RBC AUTO-ENTMCNC: 33 G/DL (ref 31.4–36)
MCV RBC AUTO: 82.5 FL (ref 80–98)
MONOCYTES # BLD AUTO: 0.71 10*3/MM3 (ref 0–0.9)
MONOCYTES NFR BLD AUTO: 5.3 % (ref 0–12)
NEUTROPHILS # BLD AUTO: 10.65 10*3/MM3 (ref 2–8.6)
NEUTROPHILS NFR BLD AUTO: 78.9 % (ref 37–80)
NRBC BLD MANUAL-RTO: 0 /100 WBC (ref 0–0)
PLATELET # BLD AUTO: 354 10*3/MM3 (ref 150–450)
PMV BLD AUTO: 8.9 FL (ref 8–12)
POTASSIUM BLD-SCNC: 4.4 MMOL/L (ref 3.5–5.1)
PROT SERPL-MCNC: 7 G/DL (ref 6.3–8.6)
RBC # BLD AUTO: 4.56 10*6/MM3 (ref 3.77–5.16)
SODIUM BLD-SCNC: 142 MMOL/L (ref 137–145)
WBC NRBC COR # BLD: 13.49 10*3/MM3 (ref 3.2–9.8)

## 2018-03-15 PROCEDURE — G0463 HOSPITAL OUTPT CLINIC VISIT: HCPCS | Performed by: INTERNAL MEDICINE

## 2018-03-15 PROCEDURE — 85610 PROTHROMBIN TIME: CPT | Performed by: NURSE PRACTITIONER

## 2018-03-15 PROCEDURE — 80053 COMPREHEN METABOLIC PANEL: CPT | Performed by: INTERNAL MEDICINE

## 2018-03-15 PROCEDURE — 85025 COMPLETE CBC W/AUTO DIFF WBC: CPT | Performed by: INTERNAL MEDICINE

## 2018-03-15 PROCEDURE — 99214 OFFICE O/P EST MOD 30 MIN: CPT | Performed by: INTERNAL MEDICINE

## 2018-03-15 NOTE — PROGRESS NOTES
PT states compliant c tx. PT denies any new medications or bleeding issues. PT denies any s/s of blood clot. PT instructed to continue same dose and usual diet. PT will be seen on Monday. Patient instructed regarding medication; results given and questions answered. Nutritional counseling given.  Dietary factors affecting therapy addressed.  Patient instructed to monitor for excessive bruising or bleeding. PT verbalizes understanding.           This document has been electronically signed by JR Knox on March 16, 2018 2:38 PM

## 2018-03-15 NOTE — PROGRESS NOTES
DATE OF VISIT: 3/15/2018    REASON FOR VISIT:  Mechanical mitral valve on Coumadin, problem having Coumadin level in the therapeutic range.    HISTORY OF PRESENT ILLNESS:    37-year-old female with a past medical history significant for open heart surgery with mitral valve replacement with mechanical valve in July 2015, history of AICD placement of week later after open-heart surgery secondary to tachycardia, congestive heart failure has been on Coumadin since July 2015 secondary to mechanical mitral valve.  Patient was initially seen in consultation on September 12, 2017 for evaluation of problem with Coumadin not being in therapeutic range on a persistent basis.  Subsequently patient was referred to Coumadin clinic for monitoring of INR.  Patient states she had another CVA in 02/2018 for which she was admitted to [facility]. Her INR range has been more therapeutic since last clinic visit. No bleeding.    PAST MEDICAL HISTORY:    Past Medical History:   Diagnosis Date   • AICD (automatic cardioverter/defibrillator) present    • Anticoagulation goal of INR 2.5 to 3.5    • Asthma    • Cardiomyopathy    • Congestive heart failure    • Diabetes mellitus    • Disease of thyroid gland     hypothyroidism    • Enlarged heart    • Hx of mitral valve replacement with mechanical valve     with thrombus 7/2017   • Migraine    • TIA (transient ischemic attack)        SOCIAL HISTORY:    Social History   Substance Use Topics   • Smoking status: Former Smoker   • Smokeless tobacco: Never Used   • Alcohol use No       Surgical History :  Past Surgical History:   Procedure Laterality Date   • A-V CARDIAC PACEMAKER INSERTION     • APPENDECTOMY     • ATRIAL CARDIAC PACEMAKER INSERTION     • CARDIAC CATHETERIZATION     • CARDIAC SURGERY     • CARDIAC VALVE SURGERY     • CYSTOSCOPY  09/23/2015   • HYSTERECTOMY     • TONSILLECTOMY     • TRANSESOPHAGEAL ECHOCARDIOGRAM (CABRERA)         ALLERGIES:    Allergies   Allergen Reactions   •  "Lortab [Hydrocodone-Acetaminophen]          FAMILY HISTORY:  Family History   Problem Relation Age of Onset   • Diabetes Father    • Hypertension Father    • Diabetes Maternal Grandmother    • Kidney disease Maternal Grandmother    • Hypertension Maternal Grandmother    • Heart disease Maternal Grandmother    • Stroke Maternal Grandmother    • Thyroid disease Maternal Grandmother    • Cancer Maternal Grandfather    • Kidney disease Paternal Grandmother    • Diabetes Paternal Grandfather            REVIEW OF SYSTEMS:      CONSTITUTIONAL:  Complains of fatigue. Denies any fever, chills or weight loss.      HEENT:  No epistaxis, mouth sores or difficulty swallowing.     RESPIRATORY:  No new shortness of breath. No new cough or hemoptysis.     CARDIOVASCULAR:  Complains of chest pain with exertion.  No palpitation.     GASTROINTESTINAL:  No abdominal pain nausea, vomiting or blood in the stool.     GENITOURINARY:  No Dysuria or Hematuria.     MUSCULOSKELETAL:  No new back pain or arthralgia..     LYMPHATICS:  Denies any abnormal swollen glands anywhere in the body.     NEUROLOGICAL : Complains of tingling and numbness affecting the right thigh since episode of hemiparesis in July 2017.  No new headaches or dizziness.   No seizures or balance problems.     SKIN:  Denies any new skin rash.        PHYSICAL EXAMINATION:      VITAL SIGNS:  /72   Pulse 59   Temp 98.9 °F (37.2 °C) (Temporal Artery )   Resp 18   Ht 167.6 cm (65.98\")   Wt 94.8 kg (208 lb 15.9 oz)   LMP 04/30/2015 (Within Months) Comment: Partial Hysterectomy  BMI 33.75 kg/m²   1    03/15/18  1047   Weight: 94.8 kg (208 lb 15.9 oz)       GENERAL:  Not in any distress.    HEENT:  Normocephalic, Atraumatic.Mild Conjunctival pallor. No icterus. Extraocular Movements Intact. No Facial Asymmetry noted.    NECK:  No adenopathy. No JVD.    RESPIRATORY:  Fair air entry bilateral. No rhonchi or wheezing.    CARDIOVASCULAR:  S1, S2. Regular rate and rhythm. No " murmur or gallop appreciated.  Prostatic valve sound heard.    ABDOMEN:  Soft, obese, nontender. Bowel sounds present in all four quadrants.  No organomegaly appreciated.    EXTREMITIES:  No edema.No Calf Tenderness.    NEUROLOGIC:  Alert, awake and oriented ×3.  No  Motor  deficit appreciated.             DIAGNOSTIC DATA:    Glucose   Date Value Ref Range Status   03/15/2018 164 (H) 60 - 100 mg/dL Final     Sodium   Date Value Ref Range Status   03/15/2018 142 137 - 145 mmol/L Final     Potassium   Date Value Ref Range Status   03/15/2018 4.4 3.5 - 5.1 mmol/L Final     CO2   Date Value Ref Range Status   03/15/2018 24.0 22.0 - 31.0 mmol/L Final     Chloride   Date Value Ref Range Status   03/15/2018 104 95 - 110 mmol/L Final     Anion Gap   Date Value Ref Range Status   03/15/2018 14.0 5.0 - 15.0 mmol/L Final     Creatinine   Date Value Ref Range Status   03/15/2018 0.84 0.50 - 1.00 mg/dL Final     BUN   Date Value Ref Range Status   03/15/2018 15 7 - 21 mg/dL Final     BUN/Creatinine Ratio   Date Value Ref Range Status   03/15/2018 17.9 7.0 - 25.0 Final     Calcium   Date Value Ref Range Status   03/15/2018 9.1 8.4 - 10.2 mg/dL Final     eGFR Non  Amer   Date Value Ref Range Status   03/15/2018 75 64 - 149 mL/min/1.73 Final     Alkaline Phosphatase   Date Value Ref Range Status   03/15/2018 65 38 - 126 U/L Final     Total Protein   Date Value Ref Range Status   03/15/2018 7.0 6.3 - 8.6 g/dL Final     ALT (SGPT)   Date Value Ref Range Status   03/15/2018 46 9 - 52 U/L Final     AST (SGOT)   Date Value Ref Range Status   03/15/2018 39 (H) 14 - 36 U/L Final     Total Bilirubin   Date Value Ref Range Status   03/15/2018 0.4 0.2 - 1.3 mg/dL Final     Albumin   Date Value Ref Range Status   03/15/2018 4.00 3.40 - 4.80 g/dL Final     Globulin   Date Value Ref Range Status   03/15/2018 3.0 2.3 - 3.5 gm/dL Final     A/G Ratio   Date Value Ref Range Status   03/15/2018 1.3 1.1 - 1.8 g/dL Final     Lab Results    Component Value Date    WBC 13.49 (H) 03/15/2018    HGB 12.4 03/15/2018    HCT 37.6 03/15/2018    MCV 82.5 03/15/2018     03/15/2018     Lab Results   Component Value Date    NEUTROABS 10.65 (H) 03/15/2018    IRON 27 (L) 12/01/2015    TIBC 416 12/01/2015    LABIRON 6.5 (L) 12/01/2015       Component       INR   Latest Ref Rng & Units       0.9 - 1.1   1/29/2018       3.90 (A)   2/1/2018       2.80 (A)   2/5/2018       3.40 (A)   2/9/2018       2.70 (A)   2/12/2018       3.20 (A)   2/15/2018       3.20 (A)   2/19/2018       5.10 (A)   2/20/2018       3.00 (A)   2/23/2018       2.30 (A)   3/1/2018       3.20 (A)   3/5/2018       3.00 (A)   3/8/2018       2.50 (A)   3/12/2018       3.10 (A)   3/15/2018       2.60 (A)             Assessment and plan:    1.  Problems with getting therapeutic INR range with Coumadin: Patient states she has been Coumadin since July 2015 secondary to her mechanical mitral valve.  Patient is getting her INR checked twice a week. Her INR has been staying more therapeutic. Due to another CVA recommend INR around 3.0. Recommend changing coumadin to 10 mg po Monday-Friday and 12.5 mg Saturday-Sunday if INR staying lower than 3.0 these recommendation were discussed with coumadin clinic.      2.  Recent hemiparesis on the right side: Clinically patient does not have any deficit at this point.  Recommend following with the neurology at this point.     3.  Intracranial aneurysm: Patient was found to have aneurysm which was incidental finding.   Currently being followed by neurosurgeon, no other intervention is planned at this point.     4.  Health maintenance: Patient does not smoke currently.  She is only 37 years of age and not due for colonoscopy at this point.  Remains full code.          Remington Welch MD  3/15/2018  5:48 PM        EMR Dragon/Transcription disclaimer:   Much of this encounter note is an electronic transcription/translation of spoken language to printed text. The  electronic translation of spoken language may permit erroneous, or at times, nonsensical words or phrases to be inadvertently transcribed; Although I have reviewed the note for such errors, some may still exist.

## 2018-03-19 ENCOUNTER — ANTICOAGULATION VISIT (OUTPATIENT)
Dept: CARDIAC SURGERY | Facility: CLINIC | Age: 38
End: 2018-03-19

## 2018-03-19 ENCOUNTER — HOSPITAL ENCOUNTER (OUTPATIENT)
Dept: OCCUPATIONAL THERAPY | Facility: HOSPITAL | Age: 38
Setting detail: THERAPIES SERIES
Discharge: HOME | End: 2018-03-19

## 2018-03-19 VITALS — DIASTOLIC BLOOD PRESSURE: 70 MMHG | SYSTOLIC BLOOD PRESSURE: 112 MMHG | OXYGEN SATURATION: 99 % | HEART RATE: 60 BPM

## 2018-03-19 DIAGNOSIS — Z78.9 IMPAIRED MOBILITY AND ACTIVITIES OF DAILY LIVING: Primary | ICD-10-CM

## 2018-03-19 DIAGNOSIS — Z78.9 IMPAIRED INSTRUMENTAL ACTIVITIES OF DAILY LIVING (IADL): ICD-10-CM

## 2018-03-19 DIAGNOSIS — Z79.01 ANTICOAGULATED ON COUMADIN: ICD-10-CM

## 2018-03-19 DIAGNOSIS — R53.1 RIGHT SIDED WEAKNESS: ICD-10-CM

## 2018-03-19 DIAGNOSIS — Z74.09 IMPAIRED MOBILITY AND ACTIVITIES OF DAILY LIVING: Primary | ICD-10-CM

## 2018-03-19 LAB — INR PPP: 2 (ref 0.9–1.1)

## 2018-03-19 PROCEDURE — G8988 SELF CARE GOAL STATUS: HCPCS

## 2018-03-19 PROCEDURE — G8987 SELF CARE CURRENT STATUS: HCPCS

## 2018-03-19 PROCEDURE — 99211 OFF/OP EST MAY X REQ PHY/QHP: CPT | Performed by: NURSE PRACTITIONER

## 2018-03-19 PROCEDURE — 85610 PROTHROMBIN TIME: CPT | Performed by: NURSE PRACTITIONER

## 2018-03-19 PROCEDURE — 97110 THERAPEUTIC EXERCISES: CPT

## 2018-03-19 PROCEDURE — G8989 SELF CARE D/C STATUS: HCPCS

## 2018-03-19 RX ORDER — ATORVASTATIN CALCIUM 80 MG/1
80 TABLET, FILM COATED ORAL NIGHTLY
COMMUNITY

## 2018-03-19 NOTE — PROGRESS NOTES
PT states compliant c tx. PT denies any new medications or bleeding issues. PT denies any missed doses or excessive k. Adjusted pt's dose and instructed to consume NO Vitamin K until usual green day on Wednesday. PT instructed to watch for any s/s of blood clot while INR is subtherapeutic. Patient instructed regarding medication; results given and questions answered. Nutritional counseling given.  Dietary factors affecting therapy addressed.  Patient instructed to monitor for excessive bruising or bleeding. PT will be seen on Thursday.   Electronically signed by JR Shearer

## 2018-03-19 NOTE — THERAPY DISCHARGE NOTE
Outpatient Occupational Therapy Rehab Program Treatment/Discharge  [facility]     Patient Name: Frances Motta  : 1980  MRN: [id number]  Today's Date: 3/19/2018        Visit Date: 2018   Visit Number: 44 Recert Date: 3/28/2018   % Improvement: 75%       MD Visit Date: 2018    Total Insurance visit approved: 30         Patient Active Problem List   Diagnosis   • Hypertrophic obstructive cardiomyopathy (HOCM)   • Diabetes mellitus   • Diabetes mellitus without complication   • Myopia   • Mixed hyperlipidemia   • Hypothyroidism   • Migraine   • Enlarged heart   • Disease of thyroid gland   • Congestive heart failure   • Asthma   • Anticoagulated on Coumadin        Past Medical History:   Diagnosis Date   • AICD (automatic cardioverter/defibrillator) present    • Anticoagulation goal of INR 2.5 to 3.5    • Asthma    • Cardiomyopathy    • Congestive heart failure    • Diabetes mellitus    • Disease of thyroid gland     hypothyroidism    • Enlarged heart    • Hx of mitral valve replacement with mechanical valve     with thrombus 2017   • Migraine    • TIA (transient ischemic attack)         Past Surgical History:   Procedure Laterality Date   • A-V CARDIAC PACEMAKER INSERTION     • APPENDECTOMY     • ATRIAL CARDIAC PACEMAKER INSERTION     • CARDIAC CATHETERIZATION     • CARDIAC SURGERY     • CARDIAC VALVE SURGERY     • CYSTOSCOPY  2015   • HYSTERECTOMY     • TONSILLECTOMY     • TRANSESOPHAGEAL ECHOCARDIOGRAM (CABRERA)           Visit Dx:    ICD-10-CM ICD-9-CM   1. Impaired mobility and activities of daily living Z74.09 799.89   2. Impaired instrumental activities of daily living (IADL) R53.81 799.3   3. Right sided weakness R53.1 728.87               OT Neuro     Row Name 18 0935             Subjective Comments    Subjective Comments Pt here reporting she has been sick all weekend with fever however fever is gone and she is feeling somewhat improved this am. Pt and STRINGER/L heavily  reviewed HEP which pt was able to demonstrate independence with and reports her R shoulder doesnt seem any worse. Pt is to see MD on April 23rd for follow up and to be seen every month for possible disability claim. Pt will be D/C'd from OT this date with free month gym membership pending MD approval and sign off.  -BL         Precautions and Contraindications    Precautions/Limitations lifting restrictions (specify in comments)  -BL      Precautions 20# lifting   -BL         Subjective Pain    Able to rate subjective pain? yes  -BL      Pre-Treatment Pain Level 4  -BL      Post-Treatment Pain Level 5  -BL      Subjective Pain Comment R shoulder near bicep   -BL         Cognitive Assessment/Intervention    Current Cognitive/Communication Assessment functional  -BL      Orientation Status (Cognition) oriented x 4  -BL      Follows Commands (Cognition) WNL  -BL      Personal Safety WNL/WFL  -BL         Sensation    Additional Comments WFL; no deficits noted per pt report this date  -BL         Proprioception    Proprioception WFL this date demonstrated however pain in R shoulder   -BL         Posture/Observations    Alignment Options Rounded shoulders  -BL      Rounded Shoulders Bilateral:;Mild  -BL         General ROM    RT Upper Ext Rt Shoulder Flexion  -BL         Right Upper Ext    Rt Shoulder Flexion AROM 148 degrees  -BL      Rt Shoulder Flexion PROM 155 degrees  -BL      Rt Upper Extremity Comments  increased pain this date.  -BL         General Assessment (Manual Muscle Testing)    General Manual Muscle Testing (MMT) Assessment no strength deficits identified  -BL      Comment, General Manual Muscle Testing (MMT) Assessment Gross MMT of BUE's 4/5 with L flexion 4+/5 this date.   -BL         Transfers    Transfers, Sit-Stand [place] independent  -BL      Transfers, Stand-Sit [place] independent  -BL         Functional Mobility    Functional Mobility- Ind. Level independent  -BL         ADL  Assessment/Intervention    Comment, IADL Assessment/Training Pt reports she is independent with all ADL's except for washing her back and with minimal difficulty if any. Pt reports her  assist her if she has any difficulties however her memory is improving with less difficulty.  -BL        User Key  (r) = Recorded By, (t) = Taken By, (c) = Cosigned By    Initials Name Provider Type     SIOMARA Cornejo/DICK Occupational Therapy Assistant           Hand Therapy (last 24 hours)      Hand Eval     Row Name 03/19/18 0935              Strength Right    Right  Test 1 50  -BL      Right  Test 2 57  -BL      Right  Test 3 60  -BL       Strength Average Right 55.67  -BL          Strength Left    Left  Test 1 57  -BL      Left  Test 2 54  -BL      Left  Test 3 60  -BL       Strength Average Left 57  -BL         Right Hand Strength - Pinch (lbs)    Lateral 18 lbs  -BL      Tip (2 point) 16 lbs  -BL      Three Jaw Danilo 19 lbs  -BL         Left Hand Strength - Pinch (lbs)    Lateral 20 lbs  -BL      Tip (2 point) 14 lbs  -BL      Three Jaw Danilo 16 lbs  -BL         Therapy Education    Given HEP;Symptoms/condition management;Pain management;Posture/body mechanics  -BL      Program New  -BL      How Provided Verbal;Demonstration;Written  -BL      Provided to Patient  -BL      Level of Understanding Teach back education performed;Verbalized;Demonstrated  -BL        User Key  (r) = Recorded By, (t) = Taken By, (c) = Cosigned By    Initials Name Provider Type     SIOMARA Cornejo/DICK Occupational Therapy Assistant                          OT Assessment/Plan     Row Name 03/19/18 0935          OT Assessment    Assessment Comments OT treatment and assessment completed this date with pt reporting overall she is independent with most ADL's and IADL's. Pt will be D/C'd this date with updated HEP per OTR/L instruction with education to contact our office with any further questions or  concerns.  -        OT Plan    OT Frequency 2x/week  -       User Key  (r) = Recorded By, (t) = Taken By, (c) = Cosigned By    Initials Name Provider Type     SIOMARA Cornejo/DICK Occupational Therapy Assistant                 OT Goals     Row Name 03/19/18 0935          OT Short Term Goals    STG Date to Achieve --   by 4 weeks  -     STG 1 In order to increase independence with ADL's/IADL's patient will increase RUE  strength scores by 5-10# as noted on 2/3 sessions.   -BL     STG 1 Progress Progressing  -     STG 2 In order to increase independence with ADL's/IADL's patient will increase RUE pinch strength scores by 3-5# as noted on 2/3 sessions.   -     STG 2 Progress Progressing  -     STG 3 Pt will demonstrate ability to recall 3/5 items after 2 minutes to improve functional STM on 2/3 sessions.   -     STG 3 Progress Met  -     STG 3 Progress Comments Met 2/3 this date   -     STG 4 Pt will participate in sustained ADL/IADL tasks for 20 minutes requiring 2 rest breaks max on 2/3 sessions.   -     STG 4 Progress Met  -     STG 4 Progress Comments Met 2/3 this date stood x 10 minutes with no LOB   -        Long Term Goals    LTG Date to Achieve --   by d/c  -     LTG 1 Pt will demonstrate/report compliance and independence with progressing HEP on 3/3 sessions.   -     LTG 1 Progress Met  -     LTG 2 In order to increase independence with ADL's/IADL's patient will increase RUE  strength/pinch strength scores to within 90% of WNL based upon age and gender norms as noted on 2/3 sessions.   -     LTG 2 Progress Partially Met  -     LTG 3 Pt will engage in BUE AROM exercises in all planes to increase RUE strength to 4/5 and LUE to 5/5 as noted on 2/3 sessions.    -     LTG 3 Progress Met  -     LTG 4 Pt will demonstrate ability to recall 5/5 items after 5 minutes to improve functional STM on 2/3 sessions.   -     LTG 4 Progress Partially Met  -BL     LTG 4 Progress  Comments at last session   -BL     LTG 5 Pt will participate in sustained ADL/IADL tasks for 45 minutes requiring 0 rest breaks on 2/3 sessions.   -BL     LTG 5 Progress Met  -BL     LTG 5 Progress Comments per pt report  -BL        Time Calculation    OT Goal Re-Cert Due Date 03/28/18  -BL       User Key  (r) = Recorded By, (t) = Taken By, (c) = Cosigned By    Initials Name Provider Type     SIOMARA Cornejo/DICK Occupational Therapy Assistant                    OT Exercises     Row Name 03/19/18 0935             Exercise 1    Exercise Name 1 Pulleys  -BL      Equipment 1 Pulley  -BL      Time (Minutes) 1 5  -BL      Intensity 1 Mild  -BL         Exercise 2    Exercise Name 2 MMT  -BL         Exercise 3    Exercise Name 3 /pinch strength  -BL         Exercise 4    Exercise Name 4 Quick dash  -BL        User Key  (r) = Recorded By, (t) = Taken By, (c) = Cosigned By    Initials Name Provider Type     SIOMARA Cornejo/DICK Occupational Therapy Assistant                Outcome Measures     Row Name 03/19/18 0935             Quick DASH    Open a tight or new jar. 2  -BL      Do heavy household chores (e.g., wash walls, wash floors) 3  -BL      Carry a shopping bag or briefcase 2  -BL      Wash your back 5  -BL      Use a knife to cut food 5  -BL      Recreational activities in which you take some force or impact through your arm, should or hand (e.g. golf, hammering, tennis, etc.) 3  -BL      During the past week, to what extent has your arm, shoulder, or hand problem interfered with your normal social activites with family, friends, neighbors or groups? 2  -BL      During the past week, were you limited in your work or other regular daily activities as a result of your arm, shoulder or hand problem? 2  -BL      Arm, Shoulder, or hand pain 2  -BL      Tingling (pins and needles) in your arm, shoulder, or hand 1  -BL      During the past week, how much difficulty have you had sleeping because of the pain in your  arm, shoulder or hand? 2  -BL      Number of Questions Answered 11  -BL      Quick DASH Score 40.91  -BL         Functional Assessment    Outcome Measure Options Quick DASH  -BL        User Key  (r) = Recorded By, (t) = Taken By, (c) = Cosigned By    Initials Name Provider Type    BL Juliette Sheets STRINGER/L Occupational Therapy Assistant            Time Calculation:   OT Start Time: 0935  OT Stop Time: 1014  OT Time Calculation (min): 39 min  Total Timed Code Minutes- OT: 39 minute(s)     Therapy Charges for Today     Code Description Service Date Service Provider Modifiers Qty     VT DME SUPPLY OR ACCESSORY, NOS 3/19/2018 Juliette Valencian, STRINGER/L  1    1978108 HC OT THER PROC EA 15 MIN 3/19/2018 Juliette JENNINGS Kortney, STRINGER/L GO 3    13079942741 HC OT SELFCARE CURRENT 3/19/2018 Juliette JENNINGS Kortney, STRINGER/L GO, CJ 1    40557171919 HC OT SELFCARE PROJECTED 3/19/2018 Juliette JENNINGS Kortney, STRINGER/L GO, CJ 1    32580225897 HC OT SELFCARE DISCHARGE 3/19/2018 Juliette Valencian, STRINGER/L GO, CJ 1          OT G-codes  OT Professional Judgement Used?: Yes  OT Functional Scales Options: Quick DASH  Score: 40.91  Functional Limitation: Self care  Self Care Current Status (): At least 20 percent but less than 40 percent impaired, limited or restricted  Self Care Goal Status (): At least 20 percent but less than 40 percent impaired, limited or restricted  Self Care Discharge Status (): At least 20 percent but less than 40 percent impaired, limited or restricted     OP OT Discharge Summary  Date of Discharge: 03/19/18  Reason for Discharge: Independent, At baseline function  Outcomes Achieved: Patient able to partially acheive established goals  Discharge Destination: Home with home program, Home without follow-up (follow up with MD on April 23,2018)  Discharge Instructions: Pt will be D/C'd from OT this date with HEP and education to continue previous stretches with pictures provided. Pt will have follow up with MD on April 23, 2018.  Was  educated to contact our office with any further questions.        KAVITA Cornejo  3/19/2018

## 2018-03-21 ENCOUNTER — APPOINTMENT (OUTPATIENT)
Dept: OCCUPATIONAL THERAPY | Facility: HOSPITAL | Age: 38
End: 2018-03-21

## 2018-03-22 ENCOUNTER — ANTICOAGULATION VISIT (OUTPATIENT)
Dept: CARDIAC SURGERY | Facility: CLINIC | Age: 38
End: 2018-03-22

## 2018-03-22 VITALS — DIASTOLIC BLOOD PRESSURE: 80 MMHG | SYSTOLIC BLOOD PRESSURE: 110 MMHG | OXYGEN SATURATION: 99 % | HEART RATE: 68 BPM

## 2018-03-22 DIAGNOSIS — Z79.01 ANTICOAGULATED ON COUMADIN: ICD-10-CM

## 2018-03-22 LAB — INR PPP: 2.3 (ref 0.9–1.1)

## 2018-03-22 PROCEDURE — 99211 OFF/OP EST MAY X REQ PHY/QHP: CPT | Performed by: NURSE PRACTITIONER

## 2018-03-22 PROCEDURE — 85610 PROTHROMBIN TIME: CPT | Performed by: NURSE PRACTITIONER

## 2018-03-22 NOTE — PROGRESS NOTES
PT states compliant c tx. PT denies any new medications or bleeding issues. PT denies any missed doses or excessive k. Adjusted pt's dose and instructed to continue regular diet. PT denies any s/s of blood clot/TIA. PT will be seen on Monday. Patient instructed regarding medication; results given and questions answered. Nutritional counseling given.  Dietary factors affecting therapy addressed.  Patient instructed to monitor for excessive bruising or bleeding. PT verbalizes understanding.   Electronically signed by JR Shearer

## 2018-03-26 ENCOUNTER — ANTICOAGULATION VISIT (OUTPATIENT)
Dept: CARDIAC SURGERY | Facility: CLINIC | Age: 38
End: 2018-03-26

## 2018-03-26 VITALS — HEART RATE: 60 BPM | SYSTOLIC BLOOD PRESSURE: 110 MMHG | OXYGEN SATURATION: 99 % | DIASTOLIC BLOOD PRESSURE: 70 MMHG

## 2018-03-26 DIAGNOSIS — Z79.01 ANTICOAGULATED ON COUMADIN: ICD-10-CM

## 2018-03-26 LAB — INR PPP: 4 (ref 0.9–1.1)

## 2018-03-26 PROCEDURE — 85610 PROTHROMBIN TIME: CPT | Performed by: NURSE PRACTITIONER

## 2018-03-26 PROCEDURE — 99211 OFF/OP EST MAY X REQ PHY/QHP: CPT | Performed by: NURSE PRACTITIONER

## 2018-03-26 NOTE — PROGRESS NOTES
PT states she took Coumadin as directed. Pt denies any new medications or bleeding issues. PT denies any s/s of blood clot. PT states she ate usual green diet. Adjusted pt's dose slightly and instructed to increase vit k slightly. Pt will be seen on Thursday. Patient instructed regarding medication; results given and questions answered. Nutritional counseling given.  Dietary factors affecting therapy addressed.  Patient instructed to monitor for excessive bruising or bleeding. PT verbalizes understanding.         This document has been electronically signed by JR Knox on March 26, 2018 12:53 PM

## 2018-03-29 ENCOUNTER — ANTICOAGULATION VISIT (OUTPATIENT)
Dept: CARDIAC SURGERY | Facility: CLINIC | Age: 38
End: 2018-03-29

## 2018-03-29 VITALS — HEART RATE: 69 BPM | OXYGEN SATURATION: 99 % | DIASTOLIC BLOOD PRESSURE: 72 MMHG | SYSTOLIC BLOOD PRESSURE: 110 MMHG

## 2018-03-29 DIAGNOSIS — Z79.01 ANTICOAGULATED ON COUMADIN: ICD-10-CM

## 2018-03-29 LAB — INR PPP: 4.1 (ref 0.9–1.1)

## 2018-03-29 PROCEDURE — 99211 OFF/OP EST MAY X REQ PHY/QHP: CPT | Performed by: NURSE PRACTITIONER

## 2018-03-29 PROCEDURE — 85610 PROTHROMBIN TIME: CPT | Performed by: NURSE PRACTITIONER

## 2018-03-29 NOTE — PROGRESS NOTES
PT states she ate extra green and took Coumadin as directed. PT denies any new medications or bleeding issues. PT denies any s/s of blood clot. Adjusted pt's dose and instructed to increase vit k today with small salad along with usual green over weekend. PT will be seen on Monday. Patient instructed regarding medication; results given and questions answered. Nutritional counseling given.  Dietary factors affecting therapy addressed.  Patient instructed to monitor for excessive bruising or bleeding.          This document has been electronically signed by JR Knox on March 29, 2018 1:10 PM

## 2018-04-02 ENCOUNTER — ANTICOAGULATION VISIT (OUTPATIENT)
Dept: CARDIAC SURGERY | Facility: CLINIC | Age: 38
End: 2018-04-02

## 2018-04-02 VITALS — HEART RATE: 61 BPM | OXYGEN SATURATION: 97 %

## 2018-04-02 DIAGNOSIS — Z79.01 ANTICOAGULATED ON COUMADIN: ICD-10-CM

## 2018-04-02 LAB — INR PPP: 4.7 (ref 0.9–1.1)

## 2018-04-02 PROCEDURE — 99211 OFF/OP EST MAY X REQ PHY/QHP: CPT | Performed by: NURSE PRACTITIONER

## 2018-04-02 PROCEDURE — 85610 PROTHROMBIN TIME: CPT | Performed by: NURSE PRACTITIONER

## 2018-04-02 NOTE — PROGRESS NOTES
PT states she ate green as directed. PT denies any new medications or bleeding issues. PT states she consumed green as directed. Adjusted pt's dose for today and pt instructed to have extra green today. PT will be out of town until Wednesday night. PT will be seen on Thursday. Patient instructed regarding medication; results given and questions answered. Nutritional counseling given.  Dietary factors affecting therapy addressed.  Patient instructed to monitor for excessive bruising or bleeding. PT verbalizes understanding.   Electronically signed by JR Shearer

## 2018-04-05 ENCOUNTER — ANTICOAGULATION VISIT (OUTPATIENT)
Dept: CARDIAC SURGERY | Facility: CLINIC | Age: 38
End: 2018-04-05

## 2018-04-05 VITALS — HEART RATE: 64 BPM

## 2018-04-05 DIAGNOSIS — Z79.01 ANTICOAGULATED ON COUMADIN: ICD-10-CM

## 2018-04-05 LAB — INR PPP: 3.9 (ref 0.9–1.1)

## 2018-04-05 PROCEDURE — 85610 PROTHROMBIN TIME: CPT | Performed by: NURSE PRACTITIONER

## 2018-04-05 NOTE — PROGRESS NOTES
PT states she took Coumadin and ate green as directed. PT denies any new medications or bleeding issues. PT denies any s/s of blood clot. Due to drop in INR, dose will left the same and pt will increase vit k today. PT will be seen on Monday. Patient instructed regarding medication; results given and questions answered. Nutritional counseling given.  Dietary factors affecting therapy addressed.  Patient instructed to monitor for excessive bruising or bleeding. PT verbalizes understanding.   Electronically signed by JR Shearer

## 2018-04-09 ENCOUNTER — ANTICOAGULATION VISIT (OUTPATIENT)
Dept: CARDIAC SURGERY | Facility: CLINIC | Age: 38
End: 2018-04-09

## 2018-04-09 VITALS — HEART RATE: 64 BPM | SYSTOLIC BLOOD PRESSURE: 116 MMHG | DIASTOLIC BLOOD PRESSURE: 60 MMHG

## 2018-04-09 DIAGNOSIS — Z79.01 ANTICOAGULATED ON COUMADIN: ICD-10-CM

## 2018-04-09 LAB — INR PPP: 4.7 (ref 0.9–1.1)

## 2018-04-09 PROCEDURE — 85610 PROTHROMBIN TIME: CPT | Performed by: NURSE PRACTITIONER

## 2018-04-09 PROCEDURE — 99211 OFF/OP EST MAY X REQ PHY/QHP: CPT | Performed by: NURSE PRACTITIONER

## 2018-04-09 NOTE — PROGRESS NOTES
Pt states no changes to meds or diet.  No bleeding issues. Pt does not wish to have INR verified by venipuncture.  Adjusted coumadin dose and instructed pt to increase Vit K intake today with a large broccoli serving.  Instructed pt to watch for s/s bleeding and report to ER for blunt trauma; pt denies bleeding issues at this time.  Recheck INR this Wednesday. Pt verbalizes instructions.  Patient instructed regarding medication; results given and questions answered. Nutritional counseling given.  Dietary factors affecting therapy addressed.  Patient instructed to monitor for excessive bruising or bleeding.          This document has been electronically signed by JR Knox on April 9, 2018 2:00 PM

## 2018-04-11 ENCOUNTER — ANTICOAGULATION VISIT (OUTPATIENT)
Dept: CARDIAC SURGERY | Facility: CLINIC | Age: 38
End: 2018-04-11

## 2018-04-11 VITALS — DIASTOLIC BLOOD PRESSURE: 66 MMHG | SYSTOLIC BLOOD PRESSURE: 118 MMHG | HEART RATE: 64 BPM

## 2018-04-11 DIAGNOSIS — Z79.01 ANTICOAGULATED ON COUMADIN: ICD-10-CM

## 2018-04-11 LAB — INR PPP: 2.8 (ref 0.9–1.1)

## 2018-04-11 PROCEDURE — 85610 PROTHROMBIN TIME: CPT | Performed by: NURSE PRACTITIONER

## 2018-04-11 NOTE — PROGRESS NOTES
Pt here today for recheck of elevated INR.  Pt denies bleeding issues.  Instructed pt on coumadin dosing schedule and will recheck INR on Monday of next wk.  Pt verbalizes.  Patient instructed regarding medication; results given and questions answered. Nutritional counseling given.  Dietary factors affecting therapy addressed.  Patient instructed to monitor for excessive bruising or bleeding.          This document has been electronically signed by JR Knox on April 11, 2018 4:43 PM

## 2018-04-16 ENCOUNTER — ANTICOAGULATION VISIT (OUTPATIENT)
Dept: CARDIAC SURGERY | Facility: CLINIC | Age: 38
End: 2018-04-16

## 2018-04-16 VITALS — HEART RATE: 60 BPM | DIASTOLIC BLOOD PRESSURE: 70 MMHG | SYSTOLIC BLOOD PRESSURE: 130 MMHG

## 2018-04-16 DIAGNOSIS — Z79.01 ANTICOAGULATED ON COUMADIN: ICD-10-CM

## 2018-04-16 LAB — INR PPP: 4.5 (ref 0.9–1.1)

## 2018-04-16 PROCEDURE — 99211 OFF/OP EST MAY X REQ PHY/QHP: CPT | Performed by: NURSE PRACTITIONER

## 2018-04-16 PROCEDURE — 85610 PROTHROMBIN TIME: CPT | Performed by: NURSE PRACTITIONER

## 2018-04-16 NOTE — PROGRESS NOTES
Pt states no changes to meds or diet.  No bleeding issues.  Adjusted coumadin dose and instructed pt to increase Vit K intake today with a cup serving of broccoli.  Notify provider if you experience excessive bleeding from the nose, cuts, gums, rectum, urinary tract, or vagina. Reddish or brown urine or stool. Vomiting of blood or hemorrhoidal bleeding. If major injury occurs present to the Emergency Department. Pt denies bleeding issues at this time.  Recheck INR this Thursday.  Pt verbalizes.  Electronically signed by JR Shearer

## 2018-04-19 ENCOUNTER — ANTICOAGULATION VISIT (OUTPATIENT)
Dept: CARDIAC SURGERY | Facility: CLINIC | Age: 38
End: 2018-04-19

## 2018-04-19 VITALS — OXYGEN SATURATION: 99 % | HEART RATE: 68 BPM | DIASTOLIC BLOOD PRESSURE: 86 MMHG | SYSTOLIC BLOOD PRESSURE: 128 MMHG

## 2018-04-19 DIAGNOSIS — Z79.01 ANTICOAGULATED ON COUMADIN: ICD-10-CM

## 2018-04-19 LAB — INR PPP: 2.6 (ref 0.9–1.1)

## 2018-04-19 PROCEDURE — 99211 OFF/OP EST MAY X REQ PHY/QHP: CPT | Performed by: NURSE PRACTITIONER

## 2018-04-19 PROCEDURE — 85610 PROTHROMBIN TIME: CPT | Performed by: NURSE PRACTITIONER

## 2018-04-19 NOTE — PROGRESS NOTES
PT states she took Coumadin and ate usual green. PT denies any med changes or bleeding issues. PT denies any s/s of blood clot. PT instructed on dosing and to consume regular diet. PT will be seen on Monday. Patient instructed regarding medication; results given and questions answered. Nutritional counseling given.  Dietary factors affecting therapy addressed.  Patient instructed to monitor for excessive bruising or bleeding. Pt verbalizes understanding.           This document has been electronically signed by JR Knox on April 19, 2018 1:44 PM

## 2018-04-25 ENCOUNTER — ANTICOAGULATION VISIT (OUTPATIENT)
Dept: CARDIAC SURGERY | Facility: CLINIC | Age: 38
End: 2018-04-25

## 2018-04-25 VITALS — HEART RATE: 71 BPM | OXYGEN SATURATION: 99 %

## 2018-04-25 DIAGNOSIS — Z79.01 ANTICOAGULATED ON COUMADIN: ICD-10-CM

## 2018-04-25 LAB — INR PPP: 3.2 (ref 0.9–1.1)

## 2018-04-25 PROCEDURE — 85610 PROTHROMBIN TIME: CPT | Performed by: NURSE PRACTITIONER

## 2018-04-25 NOTE — PROGRESS NOTES
PT was taken per ambulance to [facility] on 4/21 with stroke like symptoms. INR was 3.59 per [facility]. Does changed in computer to reflect what pt states she took while she was in the hospital. PT states she is getting a home monitor and her INR will now be managed per Dr Geraldine FISH. Called and left message with [facility]. Informed Thierno with pt's INR today and that pt's INR will need to be drawn per lab until she can get set up with home monitor. Awaiting return call. Instructed pt to call Thierno for dosing. Patient instructed regarding medication; results given and questions answered. Nutritional counseling given.  Dietary factors affecting therapy addressed.  Patient instructed to monitor for excessive bruising or bleeding. PT verbalizes understanding. PT will be archived when Thierno confirms that Dr Chandler is taking over pt's Coumadin dosing.

## 2018-05-08 ENCOUNTER — APPOINTMENT (OUTPATIENT)
Dept: CT IMAGING | Facility: HOSPITAL | Age: 38
End: 2018-05-08

## 2018-05-08 ENCOUNTER — APPOINTMENT (OUTPATIENT)
Dept: GENERAL RADIOLOGY | Facility: HOSPITAL | Age: 38
End: 2018-05-08

## 2018-05-08 ENCOUNTER — HOSPITAL ENCOUNTER (EMERGENCY)
Facility: HOSPITAL | Age: 38
Discharge: ANOTHER HOSPITAL | End: 2018-05-08
Attending: EMERGENCY MEDICINE | Admitting: EMERGENCY MEDICINE

## 2018-05-08 VITALS
WEIGHT: 210 LBS | OXYGEN SATURATION: 100 % | DIASTOLIC BLOOD PRESSURE: 57 MMHG | BODY MASS INDEX: 33.75 KG/M2 | SYSTOLIC BLOOD PRESSURE: 100 MMHG | RESPIRATION RATE: 18 BRPM | HEIGHT: 66 IN | HEART RATE: 60 BPM | TEMPERATURE: 98.3 F

## 2018-05-08 DIAGNOSIS — R53.1 ACUTE RIGHT-SIDED WEAKNESS: Primary | ICD-10-CM

## 2018-05-08 DIAGNOSIS — R07.9 CHEST PAIN, UNSPECIFIED TYPE: ICD-10-CM

## 2018-05-08 DIAGNOSIS — H53.9 VISUAL CHANGES: ICD-10-CM

## 2018-05-08 DIAGNOSIS — R51.9 NONINTRACTABLE HEADACHE, UNSPECIFIED CHRONICITY PATTERN, UNSPECIFIED HEADACHE TYPE: ICD-10-CM

## 2018-05-08 LAB
ABO GROUP BLD: NORMAL
ALBUMIN SERPL-MCNC: 4.4 G/DL (ref 3.4–4.8)
ALBUMIN/GLOB SERPL: 1.3 G/DL (ref 1.1–1.8)
ALP SERPL-CCNC: 70 U/L (ref 38–126)
ALT SERPL W P-5'-P-CCNC: 46 U/L (ref 9–52)
ANION GAP SERPL CALCULATED.3IONS-SCNC: 14 MMOL/L (ref 5–15)
APTT PPP: 33.7 SECONDS (ref 20–40.3)
AST SERPL-CCNC: 30 U/L (ref 14–36)
BACTERIA UR QL AUTO: ABNORMAL /HPF
BASOPHILS # BLD AUTO: 0.02 10*3/MM3 (ref 0–0.2)
BASOPHILS NFR BLD AUTO: 0.2 % (ref 0–2)
BILIRUB SERPL-MCNC: 0.5 MG/DL (ref 0.2–1.3)
BILIRUB UR QL STRIP: NEGATIVE
BLD GP AB SCN SERPL QL: NEGATIVE
BUN BLD-MCNC: 12 MG/DL (ref 7–21)
BUN/CREAT SERPL: 12.2 (ref 7–25)
CALCIUM SPEC-SCNC: 9.4 MG/DL (ref 8.4–10.2)
CHLORIDE SERPL-SCNC: 100 MMOL/L (ref 95–110)
CLARITY UR: CLEAR
CO2 SERPL-SCNC: 24 MMOL/L (ref 22–31)
COLOR UR: YELLOW
CREAT BLD-MCNC: 0.98 MG/DL (ref 0.5–1)
DEPRECATED RDW RBC AUTO: 40.6 FL (ref 36.4–46.3)
EOSINOPHIL # BLD AUTO: 0.1 10*3/MM3 (ref 0–0.7)
EOSINOPHIL NFR BLD AUTO: 0.8 % (ref 0–7)
ERYTHROCYTE [DISTWIDTH] IN BLOOD BY AUTOMATED COUNT: 13.5 % (ref 11.5–14.5)
GFR SERPL CREATININE-BSD FRML MDRD: 63 ML/MIN/1.73 (ref 64–149)
GLOBULIN UR ELPH-MCNC: 3.5 GM/DL (ref 2.3–3.5)
GLUCOSE BLD-MCNC: 179 MG/DL (ref 60–100)
GLUCOSE BLDC GLUCOMTR-MCNC: 185 MG/DL (ref 70–130)
GLUCOSE UR STRIP-MCNC: NEGATIVE MG/DL
HCT VFR BLD AUTO: 39.3 % (ref 35–45)
HGB BLD-MCNC: 13.1 G/DL (ref 12–15.5)
HGB UR QL STRIP.AUTO: ABNORMAL
HOLD SPECIMEN: NORMAL
HOLD SPECIMEN: NORMAL
HYALINE CASTS UR QL AUTO: ABNORMAL /LPF
IMM GRANULOCYTES # BLD: 0.05 10*3/MM3 (ref 0–0.02)
IMM GRANULOCYTES NFR BLD: 0.4 % (ref 0–0.5)
INR PPP: 2.03 (ref 0.8–1.2)
KETONES UR QL STRIP: NEGATIVE
LEUKOCYTE ESTERASE UR QL STRIP.AUTO: ABNORMAL
LYMPHOCYTES # BLD AUTO: 3.79 10*3/MM3 (ref 0.6–4.2)
LYMPHOCYTES NFR BLD AUTO: 28.7 % (ref 10–50)
Lab: NORMAL
MCH RBC QN AUTO: 27.2 PG (ref 26.5–34)
MCHC RBC AUTO-ENTMCNC: 33.3 G/DL (ref 31.4–36)
MCV RBC AUTO: 81.5 FL (ref 80–98)
MONOCYTES # BLD AUTO: 0.87 10*3/MM3 (ref 0–0.9)
MONOCYTES NFR BLD AUTO: 6.6 % (ref 0–12)
NEUTROPHILS # BLD AUTO: 8.38 10*3/MM3 (ref 2–8.6)
NEUTROPHILS NFR BLD AUTO: 63.3 % (ref 37–80)
NITRITE UR QL STRIP: NEGATIVE
PH UR STRIP.AUTO: 6 [PH] (ref 5–9)
PLATELET # BLD AUTO: 436 10*3/MM3 (ref 150–450)
PMV BLD AUTO: 8.8 FL (ref 8–12)
POTASSIUM BLD-SCNC: 3.5 MMOL/L (ref 3.5–5.1)
PROT SERPL-MCNC: 7.9 G/DL (ref 6.3–8.6)
PROT UR QL STRIP: NEGATIVE
PROTHROMBIN TIME: 22.1 SECONDS (ref 11.1–15.3)
RBC # BLD AUTO: 4.82 10*6/MM3 (ref 3.77–5.16)
RBC # UR: ABNORMAL /HPF
REF LAB TEST METHOD: ABNORMAL
RH BLD: POSITIVE
SODIUM BLD-SCNC: 138 MMOL/L (ref 137–145)
SP GR UR STRIP: ABNORMAL (ref 1–1.03)
SQUAMOUS #/AREA URNS HPF: ABNORMAL /HPF
T&S EXPIRATION DATE: NORMAL
TROPONIN I SERPL-MCNC: <0.012 NG/ML
UROBILINOGEN UR QL STRIP: ABNORMAL
WBC NRBC COR # BLD: 13.21 10*3/MM3 (ref 3.2–9.8)
WBC UR QL AUTO: ABNORMAL /HPF
WHOLE BLOOD HOLD SPECIMEN: NORMAL
WHOLE BLOOD HOLD SPECIMEN: NORMAL

## 2018-05-08 PROCEDURE — 86900 BLOOD TYPING SEROLOGIC ABO: CPT | Performed by: EMERGENCY MEDICINE

## 2018-05-08 PROCEDURE — 93005 ELECTROCARDIOGRAM TRACING: CPT | Performed by: EMERGENCY MEDICINE

## 2018-05-08 PROCEDURE — 86901 BLOOD TYPING SEROLOGIC RH(D): CPT | Performed by: EMERGENCY MEDICINE

## 2018-05-08 PROCEDURE — 85610 PROTHROMBIN TIME: CPT | Performed by: EMERGENCY MEDICINE

## 2018-05-08 PROCEDURE — 86850 RBC ANTIBODY SCREEN: CPT | Performed by: EMERGENCY MEDICINE

## 2018-05-08 PROCEDURE — 93010 ELECTROCARDIOGRAM REPORT: CPT | Performed by: INTERNAL MEDICINE

## 2018-05-08 PROCEDURE — 84484 ASSAY OF TROPONIN QUANT: CPT | Performed by: EMERGENCY MEDICINE

## 2018-05-08 PROCEDURE — 99284 EMERGENCY DEPT VISIT MOD MDM: CPT

## 2018-05-08 PROCEDURE — 80053 COMPREHEN METABOLIC PANEL: CPT | Performed by: EMERGENCY MEDICINE

## 2018-05-08 PROCEDURE — 85730 THROMBOPLASTIN TIME PARTIAL: CPT | Performed by: EMERGENCY MEDICINE

## 2018-05-08 PROCEDURE — 81001 URINALYSIS AUTO W/SCOPE: CPT | Performed by: EMERGENCY MEDICINE

## 2018-05-08 PROCEDURE — 70450 CT HEAD/BRAIN W/O DYE: CPT

## 2018-05-08 PROCEDURE — 85025 COMPLETE CBC W/AUTO DIFF WBC: CPT

## 2018-05-08 PROCEDURE — 82962 GLUCOSE BLOOD TEST: CPT

## 2018-05-08 PROCEDURE — 71045 X-RAY EXAM CHEST 1 VIEW: CPT

## 2018-05-08 RX ORDER — SODIUM CHLORIDE 0.9 % (FLUSH) 0.9 %
10 SYRINGE (ML) INJECTION AS NEEDED
Status: DISCONTINUED | OUTPATIENT
Start: 2018-05-08 | End: 2018-05-08 | Stop reason: HOSPADM

## 2018-05-08 NOTE — ED PROVIDER NOTES
Subjective   Patient presents to emergency department for chest pain, intermittent right sided weakness, headache, visual changes she describes as double vision and vision going dark.  States she recently had a TIA/stroke and was evaluated at [facility] last month.  She is currently on coumadin and states she is therapeutic.    States she thinks this is a result of her mitral valve replacement.          History provided by:  Patient   used: No    Chest Pain   Pain location:  Substernal area  Pain quality: aching and pressure    Pain radiates to:  Does not radiate  Pain severity:  Moderate  Onset quality:  Sudden  Duration:  2 hours  Timing:  Constant  Progression:  Unchanged  Chronicity:  Recurrent  Context: at rest    Context: not breathing    Associated symptoms: headache, numbness (intermittent right side of body) and weakness (intermittent right sided)    Associated symptoms: no abdominal pain, no altered mental status, no anorexia, no anxiety, no back pain, no claudication, no cough, no diaphoresis, no dizziness, no dysphagia, no fatigue, no fever, no heartburn, no lower extremity edema, no nausea, no near-syncope, no orthopnea, no palpitations, no PND, no shortness of breath, no syncope and no vomiting    Headaches:     Severity:  Mild    Onset quality:  Sudden    Timing:  Intermittent (described as right sided head tingling)    Progression:  Unchanged    Chronicity:  Recurrent  Weakness:     Onset quality:  Sudden    Duration:  2 hours    Timing:  Intermittent    Progression:  Resolved    Chronicity:  New  Risk factors: diabetes mellitus and obesity    Risk factors: no coronary artery disease, no high cholesterol, no hypertension, no prior DVT/PE, no smoking and no surgery        Review of Systems   Constitutional: Negative for diaphoresis, fatigue and fever.   HENT: Negative for trouble swallowing.    Respiratory: Negative for cough and shortness of breath.    Cardiovascular:  Positive for chest pain. Negative for palpitations, orthopnea, claudication, syncope, PND and near-syncope.   Gastrointestinal: Negative for abdominal pain, anorexia, heartburn, nausea and vomiting.   Musculoskeletal: Negative for back pain.   Neurological: Positive for weakness (intermittent right sided), numbness (intermittent right side of body) and headaches. Negative for dizziness.       Past Medical History:   Diagnosis Date   • AICD (automatic cardioverter/defibrillator) present    • Anticoagulation goal of INR 2.5 to 3.5    • Asthma    • Cardiomyopathy    • Congestive heart failure    • Diabetes mellitus    • Disease of thyroid gland     hypothyroidism    • Enlarged heart    • Hx of mitral valve replacement with mechanical valve     with thrombus 7/2017   • Migraine    • TIA (transient ischemic attack)        Allergies   Allergen Reactions   • Lortab [Hydrocodone-Acetaminophen]        Past Surgical History:   Procedure Laterality Date   • A-V CARDIAC PACEMAKER INSERTION     • APPENDECTOMY     • ATRIAL CARDIAC PACEMAKER INSERTION     • CARDIAC CATHETERIZATION     • CARDIAC SURGERY     • CARDIAC VALVE SURGERY     • CYSTOSCOPY  09/23/2015   • HYSTERECTOMY     • TONSILLECTOMY     • TRANSESOPHAGEAL ECHOCARDIOGRAM (CABRERA)         Family History   Problem Relation Age of Onset   • Diabetes Father    • Hypertension Father    • Diabetes Maternal Grandmother    • Kidney disease Maternal Grandmother    • Hypertension Maternal Grandmother    • Heart disease Maternal Grandmother    • Stroke Maternal Grandmother    • Thyroid disease Maternal Grandmother    • Cancer Maternal Grandfather    • Kidney disease Paternal Grandmother    • Diabetes Paternal Grandfather        Social History     Social History   • Marital status:      Social History Main Topics   • Smoking status: Former Smoker   • Smokeless tobacco: Never Used   • Alcohol use No   • Drug use: No   • Sexual activity: Defer     Other Topics Concern   • Not on  "file           Objective      /57 (BP Location: Right arm, Patient Position: Lying)   Pulse 60   Temp 98.3 °F (36.8 °C) (Oral)   Resp 18   Ht 167.6 cm (66\")   Wt 95.3 kg (210 lb)   LMP 04/30/2015 (Within Months) Comment: Partial Hysterectomy  SpO2 100%   BMI 33.89 kg/m²     Physical Exam   Constitutional: She is oriented to person, place, and time. She appears well-developed and well-nourished. No distress.   HENT:   Head: Normocephalic.   Eyes: Conjunctivae and EOM are normal. Pupils are equal, round, and reactive to light.   Neck: Normal range of motion. Neck supple.   Cardiovascular: Normal rate, regular rhythm and normal heart sounds.    Pulmonary/Chest: Effort normal and breath sounds normal. No respiratory distress.   Abdominal: Soft. Bowel sounds are normal. She exhibits no distension. There is tenderness (umbilical hernia(chronic)). A hernia is present.   Musculoskeletal: Normal range of motion.   Neurological: She is alert and oriented to person, place, and time. She has normal strength. No cranial nerve deficit or sensory deficit. She displays a negative Romberg sign. Gait normal. GCS eye subscore is 4. GCS verbal subscore is 5. GCS motor subscore is 6.   Skin: Skin is warm. Capillary refill takes less than 2 seconds.   Psychiatric: She has a normal mood and affect. Her behavior is normal. Thought content normal.   Nursing note and vitals reviewed.      ECG 12 Lead    Date/Time: 5/8/2018 6:38 PM  Performed by: PONCE OCHOA  Authorized by: FUAD CONDE   Interpreted by physician  Comparison: compared with previous ECG from 4/18/2018  Similar to previous ECG  Rhythm: paced  Rate: normal  BPM: 61  ST Segments: ST segments normal  Other findings: LVH  Clinical impression: abnormal ECG                 ED Course  ED Course   Comment By Time   Negative CT head stroke protocol.  Patient therapeutic on coumadin.  Intermittent symptoms suspicious for TIA.  Discussed with Dr Han " [facility] who accepted patient for transfer.   Fareed Almeida PA-C 05/08 1852      Results for orders placed or performed during the hospital encounter of 05/08/18   Comprehensive Metabolic Panel   Result Value Ref Range    Glucose 179 (H) 60 - 100 mg/dL    BUN 12 7 - 21 mg/dL    Creatinine 0.98 0.50 - 1.00 mg/dL    Sodium 138 137 - 145 mmol/L    Potassium 3.5 3.5 - 5.1 mmol/L    Chloride 100 95 - 110 mmol/L    CO2 24.0 22.0 - 31.0 mmol/L    Calcium 9.4 8.4 - 10.2 mg/dL    Total Protein 7.9 6.3 - 8.6 g/dL    Albumin 4.40 3.40 - 4.80 g/dL    ALT (SGPT) 46 9 - 52 U/L    AST (SGOT) 30 14 - 36 U/L    Alkaline Phosphatase 70 38 - 126 U/L    Total Bilirubin 0.5 0.2 - 1.3 mg/dL    eGFR Non  Amer 63 (L) 64 - 149 mL/min/1.73    Globulin 3.5 2.3 - 3.5 gm/dL    A/G Ratio 1.3 1.1 - 1.8 g/dL    BUN/Creatinine Ratio 12.2 7.0 - 25.0    Anion Gap 14.0 5.0 - 15.0 mmol/L   Protime-INR   Result Value Ref Range    Protime 22.1 (H) 11.1 - 15.3 Seconds    INR 2.03 (H) 0.80 - 1.20   aPTT   Result Value Ref Range    PTT 33.7 20.0 - 40.3 seconds   Troponin   Result Value Ref Range    Troponin I <0.012 <=0.034 ng/mL   CBC Auto Differential   Result Value Ref Range    WBC 13.21 (H) 3.20 - 9.80 10*3/mm3    RBC 4.82 3.77 - 5.16 10*6/mm3    Hemoglobin 13.1 12.0 - 15.5 g/dL    Hematocrit 39.3 35.0 - 45.0 %    MCV 81.5 80.0 - 98.0 fL    MCH 27.2 26.5 - 34.0 pg    MCHC 33.3 31.4 - 36.0 g/dL    RDW 13.5 11.5 - 14.5 %    RDW-SD 40.6 36.4 - 46.3 fl    MPV 8.8 8.0 - 12.0 fL    Platelets 436 150 - 450 10*3/mm3    Neutrophil % 63.3 37.0 - 80.0 %    Lymphocyte % 28.7 10.0 - 50.0 %    Monocyte % 6.6 0.0 - 12.0 %    Eosinophil % 0.8 0.0 - 7.0 %    Basophil % 0.2 0.0 - 2.0 %    Immature Grans % 0.4 0.0 - 0.5 %    Neutrophils, Absolute 8.38 2.00 - 8.60 10*3/mm3    Lymphocytes, Absolute 3.79 0.60 - 4.20 10*3/mm3    Monocytes, Absolute 0.87 0.00 - 0.90 10*3/mm3    Eosinophils, Absolute 0.10 0.00 - 0.70 10*3/mm3    Basophils, Absolute 0.02  0.00 - 0.20 10*3/mm3    Immature Grans, Absolute 0.05 (H) 0.00 - 0.02 10*3/mm3   Urinalysis With / Microscopic If Indicated - Urine, Clean Catch   Result Value Ref Range    Color, UA Yellow Yellow, Straw, Dark Yellow, Deisy    Appearance, UA Clear Clear    pH, UA 6.0 5.0 - 9.0    Specific Gravity, UA  1.003 - 1.030    Glucose, UA Negative Negative    Ketones, UA Negative Negative    Bilirubin, UA Negative Negative    Blood, UA Trace (A) Negative    Protein, UA Negative Negative    Leuk Esterase, UA Small (1+) (A) Negative    Nitrite, UA Negative Negative    Urobilinogen, UA 0.2 E.U./dL 0.2 - 1.0 E.U./dL   Urinalysis, Microscopic Only - Urine, Clean Catch   Result Value Ref Range    RBC, UA 0-2 (A) None Seen /HPF    WBC, UA 3-5 None Seen, 0-2, 3-5 /HPF    Bacteria, UA None Seen None Seen /HPF    Squamous Epithelial Cells, UA 3-5 (A) None Seen, 0-2 /HPF    Hyaline Casts, UA None Seen None Seen /LPF    Methodology Automated Microscopy    POC Glucose Once   Result Value Ref Range    Glucose 185 (H) 70 - 130 mg/dL   Type & Screen   Result Value Ref Range    ABO Type A     RH type Positive     Antibody Screen Negative     T&S Expiration Date 5/11/2018 11:59:59 PM    PREVIOUS HISTORY   Result Value Ref Range    Previous History Previous Record on File    Light Blue Top   Result Value Ref Range    Extra Tube hold for add-on    Green Top (Gel)   Result Value Ref Range    Extra Tube Hold for add-ons.    Lavender Top   Result Value Ref Range    Extra Tube hold for add-on    Gold Top - SST   Result Value Ref Range    Extra Tube Hold for add-ons.      Xr Chest 1 View    Result Date: 5/8/2018  Narrative: [facility] Patient Name: [facility] MANDEEP RED [facility] ORDERING: FUAD CONDE ATTENDING: FUAD CONDE REFERRING: FUAD CONDE ----------------------- PROCEDURE: Chest Single View TECHNIQUE: Single AP view of the chest COMPARISON: 4/18/2017 HISTORY: Acute Stroke Protocol (onset < 12 hrs) FINDINGS:   Life-support devices: Redemonstration of the dual left leg subclavian approach pacemaking device. Cardiac monitoring leads and wires superimpose the thorax. There are sternotomy wires from prior intervention and valve replacement. Lungs/pleura: No consolidation, pleural effusion, or pneumothorax. Heart, hilar and mediastinal structures: The heart size and mediastinal contours are within limits of normal. The trachea is midline. Skeletal Structures: No acute findings. No free air beneath the diaphragm.     Impression: No acute pulmonary or pleural finding. Electronically signed by:  Davon Eid MD  5/8/2018 6:07 PM CDT Workstation: [id number]    Ct Head Without Contrast Stroke Protocol    Result Date: 5/8/2018  Narrative: EXAM DESCRIPTION: CT HEAD WO CONTRAST STROKE PROTOCOL CLINICAL HISTORY: Stroke protocol with history given for right sided weakness.  COMPARISON: None DOSE LENGTH PRODUCT: 1063.8 CONTRAST: None TECHNIQUE:  Axial images from skull base to vertex.  This exam was performed according to our departmental dose optimization program, which includes automated exposure control, adjustment of the mA and/or KV according to patient size and/or use of iterative reconstruction technique. FINDINGS: No Chiari malformation. Extra-axial spaces: Within normal limits.  Intracranial hemorrhage: No evidence of intracranial hemorrhage or suspicious extra-axial fluid collections. Ventricular system: No hydrocephalus. Basal cisterns: Unremarkable. Cerebral parenchyma: No edema, midline shift, or mass affect. Gray-white differentiation is maintained.  Midline shift: None.  Cerebellum: No acute or suspicious finding. Brainstem : Unremarkable CT appearance. Calvarium: No acute or suspicious calvarial lesion.  Vascular system: Unremarkable noncontrast appearance.  Paranasal sinuses and mastoid air cells: Mastoid air cells and the included paranasal sinuses are clear.  Visualized orbits: Unremarkable.  Visualized upper cervical  spine: Not included. Sella: Unremarkable.  Skull base: Unremarkable. ADDITIONAL FINDINGS: None     Impression: No CT evidence of intracranial hemorrhage, mass effect, or findings of an acute large vessel distribution infarct. Phone report given to ER provider on 5/8/2018 at 5:50 PM CST. Electronically signed by:  Davon Eid MD  5/8/2018 5:54 PM CDT Workstation: [id number]                [facility]      Final diagnoses:   Acute right-sided weakness   Nonintractable headache, unspecified chronicity pattern, unspecified headache type   Chest pain, unspecified type   Visual changes            Fareed Almeida PA-C  05/08/18 [id number]

## 2018-05-09 NOTE — ED NOTES
Transfer to [facility] , Dr Wilson accepted.  Rm 422 given, radiology being placed on a disk.  Ambulance for transfer arranged.

## 2018-05-10 ENCOUNTER — APPOINTMENT (OUTPATIENT)
Dept: ONCOLOGY | Facility: CLINIC | Age: 38
End: 2018-05-10

## 2018-05-30 ENCOUNTER — ANTICOAGULATION VISIT (OUTPATIENT)
Dept: CARDIAC SURGERY | Facility: CLINIC | Age: 38
End: 2018-05-30

## 2018-05-30 DIAGNOSIS — Z79.01 ANTICOAGULATED ON COUMADIN: ICD-10-CM

## 2018-06-14 ENCOUNTER — LAB (OUTPATIENT)
Dept: ONCOLOGY | Facility: HOSPITAL | Age: 38
End: 2018-06-14

## 2018-06-14 ENCOUNTER — OFFICE VISIT (OUTPATIENT)
Dept: ONCOLOGY | Facility: CLINIC | Age: 38
End: 2018-06-14

## 2018-06-14 VITALS
BODY MASS INDEX: 35 KG/M2 | HEART RATE: 81 BPM | WEIGHT: 217.8 LBS | TEMPERATURE: 98.5 F | DIASTOLIC BLOOD PRESSURE: 83 MMHG | HEIGHT: 66 IN | SYSTOLIC BLOOD PRESSURE: 117 MMHG | RESPIRATION RATE: 18 BRPM

## 2018-06-14 DIAGNOSIS — Z79.01 SUBTHERAPEUTIC ANTICOAGULATION: ICD-10-CM

## 2018-06-14 DIAGNOSIS — Z51.81 SUBTHERAPEUTIC ANTICOAGULATION: ICD-10-CM

## 2018-06-14 DIAGNOSIS — Z79.01 ANTICOAGULATED ON COUMADIN: ICD-10-CM

## 2018-06-14 DIAGNOSIS — D64.9 ANEMIA, UNSPECIFIED TYPE: Primary | ICD-10-CM

## 2018-06-14 LAB
BASOPHILS # BLD AUTO: 0.02 10*3/MM3 (ref 0–0.2)
BASOPHILS NFR BLD AUTO: 0.3 % (ref 0–2)
DEPRECATED RDW RBC AUTO: 41.2 FL (ref 36.4–46.3)
EOSINOPHIL # BLD AUTO: 0.13 10*3/MM3 (ref 0–0.7)
EOSINOPHIL NFR BLD AUTO: 1.7 % (ref 0–7)
ERYTHROCYTE [DISTWIDTH] IN BLOOD BY AUTOMATED COUNT: 13.9 % (ref 11.5–14.5)
FERRITIN SERPL-MCNC: 8.6 NG/ML (ref 6.2–137)
FOLATE SERPL-MCNC: 15.2 NG/ML (ref 2.76–21)
HCT VFR BLD AUTO: 37.1 % (ref 35–45)
HGB BLD-MCNC: 11.9 G/DL (ref 12–15.5)
IMM GRANULOCYTES # BLD: 0.02 10*3/MM3 (ref 0–0.02)
IMM GRANULOCYTES NFR BLD: 0.3 % (ref 0–0.5)
IRON 24H UR-MRATE: 43 MCG/DL (ref 37–170)
IRON SATN MFR SERPL: 9 % (ref 15–50)
LYMPHOCYTES # BLD AUTO: 2.37 10*3/MM3 (ref 0.6–4.2)
LYMPHOCYTES NFR BLD AUTO: 30.9 % (ref 10–50)
MCH RBC QN AUTO: 26.2 PG (ref 26.5–34)
MCHC RBC AUTO-ENTMCNC: 32.1 G/DL (ref 31.4–36)
MCV RBC AUTO: 81.5 FL (ref 80–98)
MONOCYTES # BLD AUTO: 0.54 10*3/MM3 (ref 0–0.9)
MONOCYTES NFR BLD AUTO: 7 % (ref 0–12)
NEUTROPHILS # BLD AUTO: 4.59 10*3/MM3 (ref 2–8.6)
NEUTROPHILS NFR BLD AUTO: 59.8 % (ref 37–80)
PLATELET # BLD AUTO: 353 10*3/MM3 (ref 150–450)
PMV BLD AUTO: 8.9 FL (ref 8–12)
RBC # BLD AUTO: 4.55 10*6/MM3 (ref 3.77–5.16)
TIBC SERPL-MCNC: 467 MCG/DL (ref 265–497)
VIT B12 BLD-MCNC: 332 PG/ML (ref 239–931)
WBC NRBC COR # BLD: 7.67 10*3/MM3 (ref 3.2–9.8)

## 2018-06-14 PROCEDURE — 83540 ASSAY OF IRON: CPT | Performed by: INTERNAL MEDICINE

## 2018-06-14 PROCEDURE — 82607 VITAMIN B-12: CPT | Performed by: INTERNAL MEDICINE

## 2018-06-14 PROCEDURE — 85025 COMPLETE CBC W/AUTO DIFF WBC: CPT

## 2018-06-14 PROCEDURE — 82728 ASSAY OF FERRITIN: CPT | Performed by: INTERNAL MEDICINE

## 2018-06-14 PROCEDURE — 82746 ASSAY OF FOLIC ACID SERUM: CPT | Performed by: INTERNAL MEDICINE

## 2018-06-14 PROCEDURE — 99214 OFFICE O/P EST MOD 30 MIN: CPT | Performed by: INTERNAL MEDICINE

## 2018-06-14 PROCEDURE — 83550 IRON BINDING TEST: CPT | Performed by: INTERNAL MEDICINE

## 2018-06-14 PROCEDURE — G0463 HOSPITAL OUTPT CLINIC VISIT: HCPCS | Performed by: INTERNAL MEDICINE

## 2018-06-14 RX ORDER — LANOLIN ALCOHOL/MO/W.PET/CERES
1000 CREAM (GRAM) TOPICAL DAILY
Qty: 90 TABLET | Refills: 1 | Status: SHIPPED | OUTPATIENT
Start: 2018-06-14 | End: 2018-12-13 | Stop reason: SDUPTHER

## 2018-06-14 RX ORDER — DOCUSATE SODIUM 100 MG/1
100 CAPSULE, LIQUID FILLED ORAL 2 TIMES DAILY PRN
Qty: 60 CAPSULE | Refills: 2 | Status: SHIPPED | OUTPATIENT
Start: 2018-06-14 | End: 2019-06-19

## 2018-06-14 RX ORDER — LEVETIRACETAM 1000 MG/1
1000 TABLET ORAL
COMMUNITY
Start: 2018-05-10 | End: 2018-12-12

## 2018-06-14 RX ORDER — FERROUS SULFATE 325(65) MG
325 TABLET ORAL
Qty: 30 TABLET | Refills: 3 | Status: SHIPPED | OUTPATIENT
Start: 2018-06-14 | End: 2018-10-23 | Stop reason: SDUPTHER

## 2018-06-14 NOTE — PROGRESS NOTES
DATE OF VISIT: 6/14/2018    REASON FOR VISIT:  Mechanical mitral valve on Coumadin, problem having Coumadin level in the therapeutic range.    HISTORY OF PRESENT ILLNESS:    38-year-old female with a past medical history significant for open heart surgery with mitral valve replacement with mechanical valve in July 2015, history of AICD placement of week later after open-heart surgery secondary to tachycardia, congestive heart failure has been on Coumadin since July 2015 secondary to mechanical mitral valve.  Patient was initially seen in consultation on September 12, 2017 for evaluation of problem with Coumadin not being in therapeutic range on a persistent basis.  Subsequently patient was referred to Coumadin clinic for monitoring of INR.  She states since last clinic visit in March 2018 she had another hospitalization secondary to possible TIA at [facility] with complete recovery.  Patient mentions that she is being followed by Dr. Chandler at [facility] regarding her INR.  Her most recent INR checked at home was 2.6.  Complains of intermittent vaginal spotting.  Denies any other bleeding.  Denies any residual deficit with most recent TIA.    PAST MEDICAL HISTORY:    Past Medical History:   Diagnosis Date   • AICD (automatic cardioverter/defibrillator) present    • Anticoagulation goal of INR 2.5 to 3.5    • Asthma    • Cardiomyopathy    • Congestive heart failure    • Diabetes mellitus    • Disease of thyroid gland     hypothyroidism    • Enlarged heart    • Hx of mitral valve replacement with mechanical valve     with thrombus 7/2017   • Migraine    • TIA (transient ischemic attack)        SOCIAL HISTORY:    Social History   Substance Use Topics   • Smoking status: Former Smoker   • Smokeless tobacco: Never Used   • Alcohol use No       Surgical History :  Past Surgical History:   Procedure Laterality Date   • A-V CARDIAC PACEMAKER INSERTION     • APPENDECTOMY     • ATRIAL CARDIAC PACEMAKER INSERTION     • CARDIAC  "CATHETERIZATION     • CARDIAC SURGERY     • CARDIAC VALVE SURGERY     • CYSTOSCOPY  09/23/2015   • HYSTERECTOMY     • TONSILLECTOMY     • TRANSESOPHAGEAL ECHOCARDIOGRAM (CABRERA)         ALLERGIES:    Allergies   Allergen Reactions   • Lortab [Hydrocodone-Acetaminophen]          FAMILY HISTORY:  Family History   Problem Relation Age of Onset   • Diabetes Father    • Hypertension Father    • Diabetes Maternal Grandmother    • Kidney disease Maternal Grandmother    • Hypertension Maternal Grandmother    • Heart disease Maternal Grandmother    • Stroke Maternal Grandmother    • Thyroid disease Maternal Grandmother    • Cancer Maternal Grandfather    • Kidney disease Paternal Grandmother    • Diabetes Paternal Grandfather            REVIEW OF SYSTEMS:      CONSTITUTIONAL:  Complains of fatigue. Denies any fever, chills or weight loss.      HEENT:  No epistaxis, mouth sores or difficulty swallowing.     RESPIRATORY:  No new shortness of breath. No new cough or hemoptysis.     CARDIOVASCULAR:  Complains of chest pain with exertion.  No palpitation.     GASTROINTESTINAL:  No abdominal pain nausea, vomiting or blood in the stool.     GENITOURINARY:Complains of intermittent vaginal spotting.    No Dysuria or Hematuria.     MUSCULOSKELETAL:  No new back pain or arthralgia..     LYMPHATICS:  Denies any abnormal swollen glands anywhere in the body.     NEUROLOGICAL : Had seizure with most recent episode of TIA in April 2018.  Complains of tingling and numbness affecting the right thigh since episode of hemiparesis in July 2017.  No new headaches or dizziness.   No  balance problems.     SKIN:  Denies any new skin rash.        PHYSICAL EXAMINATION:      VITAL SIGNS:  /83   Pulse 81   Temp 98.5 °F (36.9 °C) (Temporal Artery )   Resp 18   Ht 167.6 cm (65.98\")   Wt 98.8 kg (217 lb 12.8 oz)   LMP 04/30/2015 (Within Months) Comment: Partial Hysterectomy  BMI 35.17 kg/m²   1    06/14/18  1045   Weight: 98.8 kg (217 lb 12.8 " oz)       GENERAL:  Not in any distress.    HEENT:  Normocephalic, Atraumatic.Mild Conjunctival pallor. No icterus. Extraocular Movements Intact. No Facial Asymmetry noted.    NECK:  No adenopathy. No JVD.    RESPIRATORY:  Fair air entry bilateral. No rhonchi or wheezing.    CARDIOVASCULAR:  S1, S2. Regular rate and rhythm. No murmur or gallop appreciated.  Prostatic valve sound heard.    ABDOMEN:  Soft, obese, nontender. Bowel sounds present in all four quadrants.  No organomegaly appreciated.    MUSCULOSKELTAL:  No edema.No Calf Tenderness.    NEUROLOGIC:  Alert, awake and oriented ×3.  No  Motor  deficit appreciated.     SKIN: No new skin lesions    LYMPHATICS: No new lymph node enlargement in neck or supraclavicular area.            DIAGNOSTIC DATA:    Glucose   Date Value Ref Range Status   05/08/2018 179 (H) 60 - 100 mg/dL Final     Sodium   Date Value Ref Range Status   05/08/2018 138 137 - 145 mmol/L Final     Potassium   Date Value Ref Range Status   05/08/2018 3.5 3.5 - 5.1 mmol/L Final     CO2   Date Value Ref Range Status   05/08/2018 24.0 22.0 - 31.0 mmol/L Final     Chloride   Date Value Ref Range Status   05/08/2018 100 95 - 110 mmol/L Final     Anion Gap   Date Value Ref Range Status   05/08/2018 14.0 5.0 - 15.0 mmol/L Final     Creatinine   Date Value Ref Range Status   05/08/2018 0.98 0.50 - 1.00 mg/dL Final     BUN   Date Value Ref Range Status   05/08/2018 12 7 - 21 mg/dL Final     BUN/Creatinine Ratio   Date Value Ref Range Status   05/08/2018 12.2 7.0 - 25.0 Final     Calcium   Date Value Ref Range Status   05/08/2018 9.4 8.4 - 10.2 mg/dL Final     eGFR Non  Amer   Date Value Ref Range Status   05/08/2018 63 (L) 64 - 149 mL/min/1.73 Final     Alkaline Phosphatase   Date Value Ref Range Status   05/08/2018 70 38 - 126 U/L Final     Total Protein   Date Value Ref Range Status   05/08/2018 7.9 6.3 - 8.6 g/dL Final     ALT (SGPT)   Date Value Ref Range Status   05/08/2018 46 9 - 52 U/L Final      AST (SGOT)   Date Value Ref Range Status   05/08/2018 30 14 - 36 U/L Final     Total Bilirubin   Date Value Ref Range Status   05/08/2018 0.5 0.2 - 1.3 mg/dL Final     Albumin   Date Value Ref Range Status   05/08/2018 4.40 3.40 - 4.80 g/dL Final     Globulin   Date Value Ref Range Status   05/08/2018 3.5 2.3 - 3.5 gm/dL Final     A/G Ratio   Date Value Ref Range Status   05/08/2018 1.3 1.1 - 1.8 g/dL Final     Lab Results   Component Value Date    WBC 7.67 06/14/2018    HGB 11.9 (L) 06/14/2018    HCT 37.1 06/14/2018    MCV 81.5 06/14/2018     06/14/2018     Lab Results   Component Value Date    NEUTROABS 4.59 06/14/2018    IRON 43 06/14/2018    TIBC 467 06/14/2018    LABIRON 9 (L) 06/14/2018    FERRITIN 8.60 06/14/2018    YGPADYFO99 332 06/14/2018    FOLATE 15.20 06/14/2018       Component       INR   Latest Ref Rng & Units       0.9 - 1.1   1/29/2018       3.90 (A)   2/1/2018       2.80 (A)   2/5/2018       3.40 (A)   2/9/2018       2.70 (A)   2/12/2018       3.20 (A)   2/15/2018       3.20 (A)   2/19/2018       5.10 (A)   2/20/2018       3.00 (A)   2/23/2018       2.30 (A)   3/1/2018       3.20 (A)   3/5/2018       3.00 (A)   3/8/2018       2.50 (A)   3/12/2018       3.10 (A)   3/15/2018       2.60 (A)       Anti cardiolipin profile (SO)4/21/2018  [facility]  Component Name Value Ref Range   ANC IGG <9   Comment:  (NOTE)                           Negative:              <15                           Indeterminate:     15 - 20                           Low-Med Positive: >20 - 80                           High Positive:         >80 0 - 14 GPL/mL    ANC IGM 10   Comment:  (NOTE)                           Negative:              <13                           Indeterminate:     13 - 20                           Low-Med Positive: >20 - 80                           High Positive:         >80 0 - 12 MPL/mL    ANC IGA <9   Comment:  (NOTE)                            Negative:              <12                           Indeterminate:     12 - 20                           Low-Med Positive: >20 - 80                           High Positive:         >80  Performed At: [facility]  [address], [place] [id number]  Kanika Tarango PhD Ph:[phone]  Test performed at [address] [place] [address]          Antithrombin III (ATIII) (SO)4/21/2018  [employer] [facility]  Component Name Value Ref Range   AT3 95   Comment:  (NOTE)  Direct Xa inhibitor anticoagulants such as rivaroxaban, apixaban and  edoxaban will lead to spuriously elevated antithrombin activity  levels possibly masking a deficiency.  Performed At: [address] [id number]  Savage COHEN MD Ph:[phone]  Test performed at [address] [place] [address] 75 - 135 %   Specimen   Citrated Plasma       FACTOR 5 (V) [place] (SO)4/21/2018  [facility]  Component Name Value Ref Range   RESULTS Comment   Comment:  (NOTE)  Result:  Negative (no mutation found)        Lupus inhibitor screen (SO)4/21/2018  [facility]  Component Name Value Ref Range   PTT-LA 43.6 0.0 - 51.9 s   DRVVT 55.3 (H) 0.0 - 47.0 s   LUPUS REFLEX INTERP Comment:   Comment:  (NOTE)  No lupus anticoagulant was detected. An extended dRVVT that corrects  on mixing with normal plasma can be caused by a deficiency of one of  the common pathway factors (X, V, II or fibrinogen).  Performed At: [address] [id number]  Savage COHEN MD Ph:[phone]  Test performed at [address] [place] [address]     Specimen         Protein C activity (SO)4/21/2018  [employer]  Component Name Value Ref Range   PROTEIN C ACTIVITY 43 (L)   Comment:        Protein S activity (SO)4/21/2018  [employer]  Component Name Value Ref Range   PROTEIN S ACTIVITY 40 (L)   Comment:  (NOTE)        Prothrombin 20210 (SO)4/21/2018  [employer] [facility]  Component Name Value Ref  Range   RESULTS Comment   Comment:  (NOTE)  NEGATIVE  No mutation identified.                RADIOLOGY DATA:   CT angiogram of the head done at [facility] on April 21, 2018 showed:  Result Impression        Anatomic variants of the [place] as above. No radiographic evidence of vascular occlusion, hemodynamically significant stenosis, or aneurysm are identified.       CT Angiogram of the neck done at [facility] on April 21, 2018 showed:  Result Impression       No hemodynamically significant stenosis identified           Assessment and plan:    1.  Problems with getting therapeutic INR range with Coumadin: Patient states she has been Coumadin since July 2015 secondary to her mechanical mitral valve.    -And patient was here at Coumadin clinic, her INR was never staging therapeutic despite of trying different dose of Coumadin.  -Patient is currently being anticoagulated with Coumadin and intermittent Lovenox under care of Dr. Chandler.  Since patient is having recurrent TIA, recommend an sedation with Coumadin clinic either at [facility] or [place].  Patient is in agreement for second opinion.  -Referral has been placed today.  -We will ask patient to return to clinic in 3 months for further discussion.      2.  Recurrent TIA:  -Patient is having episodes of TIA without any deficit at present.  -Recent CT angiogram of the head as well as CT angiogram of neck does not show any evidence of blockage.  -Hypercoagulable workup done at [facility] in April 2018 is negative except for low protein C and low protein S level.  But patient was taking Coumadin on that point and that can lower protein C and protein S.  Result of hypercoagulable workup were discussed with patient today on June 14, 2018.    3.  Anemia: Patient was found to have mild anemia with a hemoglobin of 11.4 today.  -Anemia workup done earlier today shows low iron level as well as borderline B12.  Prescription for ferrous sulfate and  B12 has been sent to her pharmacy today on June 14, 2018.     4.  Intracranial aneurysm: Patient was found to have aneurysm which was incidental finding.   Currently being followed by neurosurgeon, no other intervention is planned at this point.     5.  Health maintenance: Patient does not smoke currently.  She is only 37 years of age and not due for colonoscopy at this point.  Remains full code.          Remington Welch MD  6/14/2018  5:38 PM        EMR Dragon/Transcription disclaimer:   Much of this encounter note is an electronic transcription/translation of spoken language to printed text. The electronic translation of spoken language may permit erroneous, or at times, nonsensical words or phrases to be inadvertently transcribed; Although I have reviewed the note for such errors, some may still exist.

## 2018-06-15 ENCOUNTER — TELEPHONE (OUTPATIENT)
Dept: ONCOLOGY | Facility: HOSPITAL | Age: 38
End: 2018-06-15

## 2018-06-15 NOTE — TELEPHONE ENCOUNTER
----- Message from Remington Welch MD sent at 6/14/2018  5:52 PM CDT -----  I have sent prescription for vitamin B-12, ferrous sulfate and Colace to her pharmacy.  Please let patient know.  Thank you

## 2018-06-21 ENCOUNTER — DOCUMENTATION (OUTPATIENT)
Dept: ONCOLOGY | Facility: HOSPITAL | Age: 38
End: 2018-06-21

## 2018-09-20 ENCOUNTER — LAB (OUTPATIENT)
Dept: ONCOLOGY | Facility: HOSPITAL | Age: 38
End: 2018-09-20

## 2018-09-20 ENCOUNTER — OFFICE VISIT (OUTPATIENT)
Dept: ONCOLOGY | Facility: CLINIC | Age: 38
End: 2018-09-20

## 2018-09-20 VITALS
OXYGEN SATURATION: 98 % | SYSTOLIC BLOOD PRESSURE: 109 MMHG | RESPIRATION RATE: 18 BRPM | HEIGHT: 66 IN | HEART RATE: 90 BPM | BODY MASS INDEX: 35.45 KG/M2 | DIASTOLIC BLOOD PRESSURE: 80 MMHG | WEIGHT: 220.6 LBS | TEMPERATURE: 98.6 F

## 2018-09-20 DIAGNOSIS — Z79.01 SUBTHERAPEUTIC ANTICOAGULATION: ICD-10-CM

## 2018-09-20 DIAGNOSIS — Z51.81 SUBTHERAPEUTIC ANTICOAGULATION: ICD-10-CM

## 2018-09-20 DIAGNOSIS — D64.9 ANEMIA, UNSPECIFIED TYPE: Primary | ICD-10-CM

## 2018-09-20 DIAGNOSIS — D64.9 ANEMIA, UNSPECIFIED TYPE: ICD-10-CM

## 2018-09-20 LAB
BASOPHILS # BLD AUTO: 0.03 10*3/MM3 (ref 0–0.2)
BASOPHILS NFR BLD AUTO: 0.5 % (ref 0–2)
DEPRECATED RDW RBC AUTO: 44.4 FL (ref 36.4–46.3)
EOSINOPHIL # BLD AUTO: 0.1 10*3/MM3 (ref 0–0.7)
EOSINOPHIL NFR BLD AUTO: 1.7 % (ref 0–7)
ERYTHROCYTE [DISTWIDTH] IN BLOOD BY AUTOMATED COUNT: 14.3 % (ref 11.5–14.5)
FERRITIN SERPL-MCNC: 24.1 NG/ML (ref 6.2–137)
FOLATE SERPL-MCNC: 13 NG/ML (ref 2.76–21)
HCT VFR BLD AUTO: 37.6 % (ref 35–45)
HGB BLD-MCNC: 12.6 G/DL (ref 12–15.5)
IMM GRANULOCYTES # BLD: 0.02 10*3/MM3 (ref 0–0.02)
IMM GRANULOCYTES NFR BLD: 0.3 % (ref 0–0.5)
IRON 24H UR-MRATE: 109 MCG/DL (ref 37–170)
IRON SATN MFR SERPL: 29 % (ref 15–50)
LYMPHOCYTES # BLD AUTO: 2.49 10*3/MM3 (ref 0.6–4.2)
LYMPHOCYTES NFR BLD AUTO: 43.1 % (ref 10–50)
MCH RBC QN AUTO: 28.3 PG (ref 26.5–34)
MCHC RBC AUTO-ENTMCNC: 33.5 G/DL (ref 31.4–36)
MCV RBC AUTO: 84.3 FL (ref 80–98)
MONOCYTES # BLD AUTO: 0.34 10*3/MM3 (ref 0–0.9)
MONOCYTES NFR BLD AUTO: 5.9 % (ref 0–12)
NEUTROPHILS # BLD AUTO: 2.8 10*3/MM3 (ref 2–8.6)
NEUTROPHILS NFR BLD AUTO: 48.5 % (ref 37–80)
NRBC BLD MANUAL-RTO: 0 /100 WBC (ref 0–0)
PLATELET # BLD AUTO: 345 10*3/MM3 (ref 150–450)
PMV BLD AUTO: 8.4 FL (ref 8–12)
RBC # BLD AUTO: 4.46 10*6/MM3 (ref 3.77–5.16)
TIBC SERPL-MCNC: 374 MCG/DL (ref 265–497)
VIT B12 BLD-MCNC: 860 PG/ML (ref 239–931)
WBC NRBC COR # BLD: 5.78 10*3/MM3 (ref 3.2–9.8)

## 2018-09-20 PROCEDURE — 1157F ADVNC CARE PLAN IN RCRD: CPT | Performed by: INTERNAL MEDICINE

## 2018-09-20 PROCEDURE — G0463 HOSPITAL OUTPT CLINIC VISIT: HCPCS | Performed by: INTERNAL MEDICINE

## 2018-09-20 PROCEDURE — 83550 IRON BINDING TEST: CPT | Performed by: INTERNAL MEDICINE

## 2018-09-20 PROCEDURE — 82607 VITAMIN B-12: CPT | Performed by: INTERNAL MEDICINE

## 2018-09-20 PROCEDURE — 82728 ASSAY OF FERRITIN: CPT | Performed by: INTERNAL MEDICINE

## 2018-09-20 PROCEDURE — 82746 ASSAY OF FOLIC ACID SERUM: CPT | Performed by: INTERNAL MEDICINE

## 2018-09-20 PROCEDURE — 85025 COMPLETE CBC W/AUTO DIFF WBC: CPT | Performed by: INTERNAL MEDICINE

## 2018-09-20 PROCEDURE — 99214 OFFICE O/P EST MOD 30 MIN: CPT | Performed by: INTERNAL MEDICINE

## 2018-09-20 PROCEDURE — 83540 ASSAY OF IRON: CPT | Performed by: INTERNAL MEDICINE

## 2018-09-20 NOTE — PROGRESS NOTES
DATE OF VISIT: 9/20/2018    REASON FOR VISIT:  Mechanical mitral valve on Coumadin, problem having Coumadin level in the therapeutic range ,Anemia    HISTORY OF PRESENT ILLNESS:    38-year-old female with a past medical history significant for open heart surgery with mitral valve replacement with mechanical valve in July 2015, history of AICD placement of week later after open-heart surgery secondary to tachycardia, congestive heart failure has been on Coumadin since July 2015 secondary to mechanical mitral valve.  Patient was initially seen in consultation on September 12, 2017 for evaluation of problem with Coumadin not being in therapeutic range on a persistent basis.  Subsequently patient was referred to Coumadin clinic for monitoring of INR.  Patient was last seen here on June 22, 2018.  Due to recurrent TIA in subtherapeutic INR in between, patient was referred to [facility] where she was evaluated by Dr. Cabrera on July 9, 2018.  States she had another episode of TIA on August 18, 2018 where she was evaluated at [facility].  Her most recent INR yesterday was elevated at 5.6.  Denies any bleeding.  Complains of diarrhea for last 5-6 days.      PAST MEDICAL HISTORY:    Past Medical History:   Diagnosis Date   • AICD (automatic cardioverter/defibrillator) present    • Anticoagulation goal of INR 2.5 to 3.5    • Asthma    • Cardiomyopathy (CMS/HCC)    • Congestive heart failure (CMS/HCC)    • Diabetes mellitus (CMS/HCC)    • Disease of thyroid gland     hypothyroidism    • Enlarged heart    • Hx of mitral valve replacement with mechanical valve     with thrombus 7/2017   • Migraine    • TIA (transient ischemic attack)        SOCIAL HISTORY:    Social History   Substance Use Topics   • Smoking status: Former Smoker   • Smokeless tobacco: Never Used   • Alcohol use No       Surgical History :  Past Surgical History:   Procedure Laterality Date   • A-V CARDIAC PACEMAKER INSERTION     •  "APPENDECTOMY     • ATRIAL CARDIAC PACEMAKER INSERTION     • CARDIAC CATHETERIZATION     • CARDIAC SURGERY     • CARDIAC VALVE SURGERY     • CYSTOSCOPY  09/23/2015   • HYSTERECTOMY     • TONSILLECTOMY     • TRANSESOPHAGEAL ECHOCARDIOGRAM (CABRERA)         ALLERGIES:    Allergies   Allergen Reactions   • Lortab [Hydrocodone-Acetaminophen]          FAMILY HISTORY:  Family History   Problem Relation Age of Onset   • Diabetes Father    • Hypertension Father    • Diabetes Maternal Grandmother    • Kidney disease Maternal Grandmother    • Hypertension Maternal Grandmother    • Heart disease Maternal Grandmother    • Stroke Maternal Grandmother    • Thyroid disease Maternal Grandmother    • Cancer Maternal Grandfather    • Kidney disease Paternal Grandmother    • Diabetes Paternal Grandfather            REVIEW OF SYSTEMS:      CONSTITUTIONAL:  Complains of fatigue. Denies any fever, chills or weight loss.      HEENT:  No epistaxis, mouth sores or difficulty swallowing.     RESPIRATORY:  No new shortness of breath. No new cough or hemoptysis.     CARDIOVASCULAR:  Complains of chest pain with exertion.  No palpitation.     GASTROINTESTINAL:   complains of diarrhea.  No abdominal pain nausea, vomiting or blood in the stool.     GENITOURINARY:Complains of intermittent vaginal spotting.    No Dysuria or Hematuria.     MUSCULOSKELETAL:  No new back pain or arthralgia..     LYMPHATICS:  Denies any abnormal swollen glands anywhere in the body.     NEUROLOGICAL : Had seizure with most recent episode of TIA in August 2018.  Complains of tingling and numbness affecting the right thigh since episode of hemiparesis in July 2017.  No new headaches or dizziness.   No  balance problems.     SKIN:  Denies any new skin rash.        PHYSICAL EXAMINATION:      VITAL SIGNS:  /80   Pulse 90   Temp 98.6 °F (37 °C) (Temporal Artery )   Resp 18   Ht 167.6 cm (65.98\")   Wt 100 kg (220 lb 9.6 oz)   LMP 04/30/2015 (Within Months) Comment: " Partial Hysterectomy  SpO2 98%   BMI 35.62 kg/m²   1    09/20/18  1050   Weight: 100 kg (220 lb 9.6 oz)       GENERAL:  Not in any distress.Obese female.    HEENT:  Normocephalic, Atraumatic.Mild Conjunctival pallor. No icterus. Extraocular Movements Intact. No Facial Asymmetry noted.    NECK:  No adenopathy. No JVD.  Trachea in midline.    RESPIRATORY:  Fair air entry bilateral. No rhonchi or wheezing.    CARDIOVASCULAR:  S1, S2. Regular rate and rhythm. No murmur or gallop appreciated.  Prostatic valve sound heard.    ABDOMEN:  Soft, obese, nontender. Bowel sounds present in all four quadrants.  No organomegaly appreciated.    MUSCULOSKELTAL:  No edema.No Calf Tenderness.  Decreased range of motion.    NEUROLOGIC:  Alert, awake and oriented ×3.  No  Motor  deficit appreciated.     SKIN: No new skin lesions    LYMPHATICS: No new lymph node enlargement in neck or supraclavicular area.            DIAGNOSTIC DATA:    Glucose   Date Value Ref Range Status   05/08/2018 179 (H) 60 - 100 mg/dL Final     Sodium   Date Value Ref Range Status   05/08/2018 138 137 - 145 mmol/L Final     Potassium   Date Value Ref Range Status   05/08/2018 3.5 3.5 - 5.1 mmol/L Final     CO2   Date Value Ref Range Status   05/08/2018 24.0 22.0 - 31.0 mmol/L Final     Chloride   Date Value Ref Range Status   05/08/2018 100 95 - 110 mmol/L Final     Anion Gap   Date Value Ref Range Status   05/08/2018 14.0 5.0 - 15.0 mmol/L Final     Creatinine   Date Value Ref Range Status   05/08/2018 0.98 0.50 - 1.00 mg/dL Final     BUN   Date Value Ref Range Status   05/08/2018 12 7 - 21 mg/dL Final     BUN/Creatinine Ratio   Date Value Ref Range Status   05/08/2018 12.2 7.0 - 25.0 Final     Calcium   Date Value Ref Range Status   05/08/2018 9.4 8.4 - 10.2 mg/dL Final     eGFR Non  Amer   Date Value Ref Range Status   05/08/2018 63 (L) 64 - 149 mL/min/1.73 Final     Alkaline Phosphatase   Date Value Ref Range Status   05/08/2018 70 38 - 126 U/L Final      Total Protein   Date Value Ref Range Status   05/08/2018 7.9 6.3 - 8.6 g/dL Final     ALT (SGPT)   Date Value Ref Range Status   05/08/2018 46 9 - 52 U/L Final     AST (SGOT)   Date Value Ref Range Status   05/08/2018 30 14 - 36 U/L Final     Total Bilirubin   Date Value Ref Range Status   05/08/2018 0.5 0.2 - 1.3 mg/dL Final     Albumin   Date Value Ref Range Status   05/08/2018 4.40 3.40 - 4.80 g/dL Final     Globulin   Date Value Ref Range Status   05/08/2018 3.5 2.3 - 3.5 gm/dL Final     Lab Results   Component Value Date    WBC 5.78 09/20/2018    HGB 12.6 09/20/2018    HCT 37.6 09/20/2018    MCV 84.3 09/20/2018     09/20/2018     Lab Results   Component Value Date    NEUTROABS 2.80 09/20/2018    IRON 109 09/20/2018    TIBC 374 09/20/2018    LABIRON 29 09/20/2018    FERRITIN 24.10 09/20/2018    EADEKCJL89 860 09/20/2018    FOLATE 13.00 09/20/2018       Component       INR   Latest Ref Rng & Units       0.9 - 1.1   1/29/2018       3.90 (A)   2/1/2018       2.80 (A)   2/5/2018       3.40 (A)   2/9/2018       2.70 (A)   2/12/2018       3.20 (A)   2/15/2018       3.20 (A)   2/19/2018       5.10 (A)   2/20/2018       3.00 (A)   2/23/2018       2.30 (A)   3/1/2018       3.20 (A)   3/5/2018       3.00 (A)   3/8/2018       2.50 (A)   3/12/2018       3.10 (A)   3/15/2018       2.60 (A)       Anti cardiolipin profile (SO)4/21/2018  [facility]  Component Name Value Ref Range   ANC IGG <9   Comment:  (NOTE)                           Negative:              <15                           Indeterminate:     15 - 20                           Low-Med Positive: >20 - 80                           High Positive:         >80 0 - 14 GPL/mL    ANC IGM 10   Comment:  (NOTE)                           Negative:              <13                           Indeterminate:     13 - 20                           Low-Med Positive: >20 - 80                           High Positive:         >80 0 - 12 MPL/mL    ANC IGA <9    Comment:  (NOTE)                           Negative:              <12                           Indeterminate:     12 - 20                           Low-Med Positive: >20 - 80                           High Positive:         >80  Performed At: [facility]  [address], [place] [id number]  Kanika Tarango PhD Ph:[phone]  Test performed at [address] [place] [address]          Antithrombin III (ATIII) (SO)4/21/2018  [facility]  Component Name Value Ref Range   AT3 95   Comment:  (NOTE)  Direct Xa inhibitor anticoagulants such as rivaroxaban, apixaban and  edoxaban will lead to spuriously elevated antithrombin activity  levels possibly masking a deficiency.  Performed At: [address] [id number]  Savage COHEN MD Ph:[phone]  Test performed at [address] [place] [address] 75 - 135 %   Specimen   Citrated Plasma       FACTOR 5 (V) [place] (SO)4/21/2018  [facility]  Component Name Value Ref Range   RESULTS Comment   Comment:  (NOTE)  Result:  Negative (no mutation found)        Lupus inhibitor screen (SO)4/21/2018  [facility]  Component Name Value Ref Range   PTT-LA 43.6 0.0 - 51.9 s   DRVVT 55.3 (H) 0.0 - 47.0 s   LUPUS REFLEX INTERP Comment:   Comment:  (NOTE)  No lupus anticoagulant was detected. An extended dRVVT that corrects  on mixing with normal plasma can be caused by a deficiency of one of  the common pathway factors (X, V, II or fibrinogen).  Performed At: [address] [id number]  Savage COHEN MD Ph:[phone]  Test performed at [address] [place] [address]     Specimen         Protein C activity (SO)4/21/2018  [facility]  Component Name Value Ref Range   PROTEIN C ACTIVITY 43 (L)   Comment:        Protein S activity (SO)4/21/2018  [facility]  Component Name Value Ref Range   PROTEIN S ACTIVITY 40 (L)   Comment:  (NOTE)        Prothrombin [address]210  (SO)4/21/2018  [facility]  Component Name Value Ref Range   RESULTS Comment   Comment:  (NOTE)  NEGATIVE  No mutation identified.                RADIOLOGY DATA:   CT angiogram of the head done at [facility] on April 21, 2018 showed:  Result Impression        Anatomic variants of the [place] as above. No radiographic evidence of vascular occlusion, hemodynamically significant stenosis, or aneurysm are identified.       CT Angiogram of the neck done at [facility] on April 21, 2018 showed:  Result Impression       No hemodynamically significant stenosis identified           Assessment and plan:    1.  Problems with getting therapeutic INR range with Coumadin: Patient states she has been Coumadin since July 2015 secondary to her mechanical mitral valve.    -And patient was here at Coumadin clinic, her INR was never staging therapeutic despite of trying different dose of Coumadin.  -Patient is currently being anticoagulated with Coumadin and intermittent Lovenox under care of Dr. Chandler.    -Most recent INR yesterday on September 19, 2018 was 5.6.  -Patient was evaluated by Dr. Cabrera in [place] for second opinion on July 9, 2018.  He recommend continuing with Coumadin with intermittent Lovenox if required.  -We will ask patient to return to clinic in 3 months with a repeat CBC and anemia workup on that day.    2.  Recurrent TIA:  -Patient is having episodes of TIA without any deficit at present.  -Recent CT angiogram of the head as well as CT angiogram of neck does not show any evidence of blockage.  -Hypercoagulable workup done at [facility] in April 2018 is negative except for low protein C and low protein S level.  But patient was taking Coumadin on that point and that can lower protein C and protein S.  Result of hypercoagulable workup were discussed with patient on June 14, 2018.  -Patient is scheduled to get inpatient evaluation for her recurrent TIA and seizure like activity in  [place] in October 2018    3.  Anemia:  -Resolved.  Hemoglobin is improved to 12.4 today.  Patient is currently taking ferrous sulfate 1 tablet by mouth daily along with vitamin B12 and folic acid.  Anemia workup has been repeated today we will follow up on the result of anemia workup.  For now we will recommend continuing taking ferrous sulfate vitamin B12 and folic acid until next clinic visit in 3 months.     4.  Intracranial aneurysm: Patient was found to have aneurysm which was incidental finding.   Currently being followed by neurosurgeon, no other intervention is planned at this point.     5.  Health maintenance: Patient does not smoke currently.  She is only 38 years of age and not due for colonoscopy at this point.      6. Advance care planning: Patient has advanced directive on file.          Remington Welch MD  9/20/2018  4:48 PM        EMR Dragon/Transcription disclaimer:   Much of this encounter note is an electronic transcription/translation of spoken language to printed text. The electronic translation of spoken language may permit erroneous, or at times, nonsensical words or phrases to be inadvertently transcribed; Although I have reviewed the note for such errors, some may still exist.

## 2018-10-23 ENCOUNTER — TELEPHONE (OUTPATIENT)
Dept: ONCOLOGY | Facility: HOSPITAL | Age: 38
End: 2018-10-23

## 2018-10-23 RX ORDER — FERROUS SULFATE 325(65) MG
325 TABLET ORAL
Qty: 30 TABLET | Refills: 3 | Status: SHIPPED | OUTPATIENT
Start: 2018-10-23 | End: 2020-03-31 | Stop reason: SDUPTHER

## 2018-10-23 NOTE — TELEPHONE ENCOUNTER
Left message that prescription has been sent into her pharmacy and if she has any questions to give us a call back.

## 2018-12-11 ENCOUNTER — TRANSCRIBE ORDERS (OUTPATIENT)
Dept: ORTHOPEDIC SURGERY | Facility: CLINIC | Age: 38
End: 2018-12-11

## 2018-12-11 DIAGNOSIS — M25.562 CHRONIC PAIN OF LEFT KNEE: Primary | ICD-10-CM

## 2018-12-11 DIAGNOSIS — G89.29 CHRONIC PAIN OF LEFT KNEE: Primary | ICD-10-CM

## 2018-12-12 ENCOUNTER — LAB (OUTPATIENT)
Dept: ONCOLOGY | Facility: HOSPITAL | Age: 38
End: 2018-12-12

## 2018-12-12 ENCOUNTER — OFFICE VISIT (OUTPATIENT)
Dept: ONCOLOGY | Facility: CLINIC | Age: 38
End: 2018-12-12

## 2018-12-12 VITALS
HEART RATE: 88 BPM | BODY MASS INDEX: 36.83 KG/M2 | HEIGHT: 66 IN | WEIGHT: 229.2 LBS | RESPIRATION RATE: 18 BRPM | TEMPERATURE: 98.3 F | SYSTOLIC BLOOD PRESSURE: 122 MMHG | DIASTOLIC BLOOD PRESSURE: 83 MMHG | OXYGEN SATURATION: 99 %

## 2018-12-12 DIAGNOSIS — D64.9 ANEMIA, UNSPECIFIED TYPE: ICD-10-CM

## 2018-12-12 DIAGNOSIS — Z51.81 SUBTHERAPEUTIC ANTICOAGULATION: ICD-10-CM

## 2018-12-12 DIAGNOSIS — Z79.01 SUBTHERAPEUTIC ANTICOAGULATION: ICD-10-CM

## 2018-12-12 DIAGNOSIS — D64.9 ANEMIA, UNSPECIFIED TYPE: Primary | ICD-10-CM

## 2018-12-12 LAB
BASOPHILS # BLD AUTO: 0.01 10*3/MM3 (ref 0–0.2)
BASOPHILS NFR BLD AUTO: 0.1 % (ref 0–2)
DEPRECATED RDW RBC AUTO: 39.7 FL (ref 36.4–46.3)
EOSINOPHIL # BLD AUTO: 0.15 10*3/MM3 (ref 0–0.7)
EOSINOPHIL NFR BLD AUTO: 1.8 % (ref 0–7)
ERYTHROCYTE [DISTWIDTH] IN BLOOD BY AUTOMATED COUNT: 12.9 % (ref 11.5–14.5)
FERRITIN SERPL-MCNC: 37.4 NG/ML (ref 6.2–137)
FOLATE SERPL-MCNC: 9.88 NG/ML (ref 2.76–21)
HCT VFR BLD AUTO: 37.5 % (ref 35–45)
HGB BLD-MCNC: 12.6 G/DL (ref 12–15.5)
IMM GRANULOCYTES # BLD: 0.02 10*3/MM3 (ref 0–0.02)
IMM GRANULOCYTES NFR BLD: 0.2 % (ref 0–0.5)
IRON 24H UR-MRATE: 72 MCG/DL (ref 37–170)
IRON SATN MFR SERPL: 19 % (ref 15–50)
LYMPHOCYTES # BLD AUTO: 2.78 10*3/MM3 (ref 0.6–4.2)
LYMPHOCYTES NFR BLD AUTO: 33.3 % (ref 10–50)
MCH RBC QN AUTO: 28.4 PG (ref 26.5–34)
MCHC RBC AUTO-ENTMCNC: 33.6 G/DL (ref 31.4–36)
MCV RBC AUTO: 84.5 FL (ref 80–98)
MONOCYTES # BLD AUTO: 0.61 10*3/MM3 (ref 0–0.9)
MONOCYTES NFR BLD AUTO: 7.3 % (ref 0–12)
NEUTROPHILS # BLD AUTO: 4.78 10*3/MM3 (ref 2–8.6)
NEUTROPHILS NFR BLD AUTO: 57.3 % (ref 37–80)
PLATELET # BLD AUTO: 341 10*3/MM3 (ref 150–450)
PMV BLD AUTO: 9.1 FL (ref 8–12)
RBC # BLD AUTO: 4.44 10*6/MM3 (ref 3.77–5.16)
TIBC SERPL-MCNC: 379 MCG/DL (ref 265–497)
VIT B12 BLD-MCNC: 921 PG/ML (ref 239–931)
WBC NRBC COR # BLD: 8.35 10*3/MM3 (ref 3.2–9.8)

## 2018-12-12 PROCEDURE — G0463 HOSPITAL OUTPT CLINIC VISIT: HCPCS | Performed by: INTERNAL MEDICINE

## 2018-12-12 PROCEDURE — 83550 IRON BINDING TEST: CPT

## 2018-12-12 PROCEDURE — G8417 CALC BMI ABV UP PARAM F/U: HCPCS | Performed by: INTERNAL MEDICINE

## 2018-12-12 PROCEDURE — 82607 VITAMIN B-12: CPT

## 2018-12-12 PROCEDURE — 83540 ASSAY OF IRON: CPT

## 2018-12-12 PROCEDURE — 85025 COMPLETE CBC W/AUTO DIFF WBC: CPT

## 2018-12-12 PROCEDURE — 82746 ASSAY OF FOLIC ACID SERUM: CPT

## 2018-12-12 PROCEDURE — 99214 OFFICE O/P EST MOD 30 MIN: CPT | Performed by: INTERNAL MEDICINE

## 2018-12-12 PROCEDURE — 82728 ASSAY OF FERRITIN: CPT

## 2018-12-12 PROCEDURE — 1157F ADVNC CARE PLAN IN RCRD: CPT | Performed by: INTERNAL MEDICINE

## 2018-12-12 NOTE — PROGRESS NOTES
DATE OF VISIT: 12/12/2018    REASON FOR VISIT:  Mechanical mitral valve on Coumadin, problem having Coumadin level in the therapeutic range ,Anemia    HISTORY OF PRESENT ILLNESS:    38-year-old female with a past medical history significant for open heart surgery with mitral valve replacement with mechanical valve in July 2015, history of AICD placement of week later after open-heart surgery secondary to tachycardia, congestive heart failure has been on Coumadin since July 2015 secondary to mechanical mitral valve.  Patient was initially seen in consultation on September 12, 2017 for evaluation of problem with Coumadin not being in therapeutic range on a persistent basis.  Subsequently patient was referred to Coumadin clinic for monitoring of INR.   Due to recurrent TIA in subtherapeutic INR in between, patient was referred to [facility] where she was evaluated by Dr. Cabrera on July 9, 2018.  States she had another episode of TIA on August 18, 2018 where she was evaluated at [facility].  Denies any more TIA episodes since August 2018.  Her most recent INR on December 10, 2018 was 2.9.   Denies any bleeding.        PAST MEDICAL HISTORY:    Past Medical History:   Diagnosis Date   • AICD (automatic cardioverter/defibrillator) present    • Anticoagulation goal of INR 2.5 to 3.5    • Asthma    • Cardiomyopathy (CMS/HCC)    • Congestive heart failure (CMS/HCC)    • Diabetes mellitus (CMS/HCC)    • Disease of thyroid gland     hypothyroidism    • Enlarged heart    • Hx of mitral valve replacement with mechanical valve     with thrombus 7/2017   • Migraine    • TIA (transient ischemic attack)        SOCIAL HISTORY:    Social History     Tobacco Use   • Smoking status: Former Smoker   • Smokeless tobacco: Never Used   Substance Use Topics   • Alcohol use: No   • Drug use: No       Surgical History :  Past Surgical History:   Procedure Laterality Date   • A-V CARDIAC PACEMAKER INSERTION     • APPENDECTOMY  "    • ATRIAL CARDIAC PACEMAKER INSERTION     • CARDIAC CATHETERIZATION     • CARDIAC SURGERY     • CARDIAC VALVE SURGERY     • CYSTOSCOPY  09/23/2015   • HYSTERECTOMY     • TONSILLECTOMY     • TRANSESOPHAGEAL ECHOCARDIOGRAM (CABRERA)         ALLERGIES:    Allergies   Allergen Reactions   • Lortab [Hydrocodone-Acetaminophen]          FAMILY HISTORY:  Family History   Problem Relation Age of Onset   • Diabetes Father    • Hypertension Father    • Diabetes Maternal Grandmother    • Kidney disease Maternal Grandmother    • Hypertension Maternal Grandmother    • Heart disease Maternal Grandmother    • Stroke Maternal Grandmother    • Thyroid disease Maternal Grandmother    • Cancer Maternal Grandfather    • Kidney disease Paternal Grandmother    • Diabetes Paternal Grandfather            REVIEW OF SYSTEMS:      CONSTITUTIONAL:  Complains of fatigue. Has gained 9 pounds since last clinic visit. Denies any fever, chills or weight loss.      HEENT:  No epistaxis, mouth sores or difficulty swallowing.     RESPIRATORY:  No new shortness of breath. No new cough or hemoptysis.     CARDIOVASCULAR:  Complains of chest pain with exertion.  No palpitation.     GASTROINTESTINAL:   No abdominal pain nausea, vomiting or blood in the stool.     GENITOURINARY:Complains of intermittent vaginal spotting.    No Dysuria or Hematuria.     MUSCULOSKELETAL:  No new back pain or arthralgia..     LYMPHATICS:  Denies any abnormal swollen glands anywhere in the body.     NEUROLOGICAL : Had seizure with most recent episode of TIA in August 2018.  Complains of tingling and numbness affecting the right thigh since episode of hemiparesis in July 2017.  No new headaches or dizziness.   No  balance problems.     SKIN:  Denies any new skin rash.        PHYSICAL EXAMINATION:      VITAL SIGNS:  /83   Pulse 88   Temp 98.3 °F (36.8 °C) (Temporal)   Resp 18   Ht 167.6 cm (65.98\")   Wt 104 kg (229 lb 3.2 oz)   LMP 04/30/2015 (Within Months) Comment: " Partial Hysterectomy  SpO2 99%   BMI 37.01 kg/m²       12/12/18  0747   Weight: 104 kg (229 lb 3.2 oz)       GENERAL:  Not in any distress.Obese female.    HEENT:  Normocephalic, Atraumatic.Mild Conjunctival pallor. No icterus. Extraocular Movements Intact. No Facial Asymmetry noted.    NECK:  No adenopathy. No JVD.  Trachea in midline.    RESPIRATORY:  Fair air entry bilateral. No rhonchi or wheezing.    CARDIOVASCULAR:  S1, S2. Regular rate and rhythm. No murmur or gallop appreciated.  Prosthetic valve sound heard.    ABDOMEN:  Soft, obese, nontender. Bowel sounds present in all four quadrants.  No organomegaly appreciated.    MUSCULOSKELTAL:  No edema.No Calf Tenderness.  Decreased range of motion.    NEUROLOGIC:  Alert, awake and oriented ×3.  No  Motor  deficit appreciated.     SKIN: No new skin lesions    LYMPHATICS: No new lymph node enlargement in neck or supraclavicular area.            DIAGNOSTIC DATA:    Glucose   Date Value Ref Range Status   05/08/2018 179 (H) 60 - 100 mg/dL Final     Sodium   Date Value Ref Range Status   05/08/2018 138 137 - 145 mmol/L Final     Potassium   Date Value Ref Range Status   05/08/2018 3.5 3.5 - 5.1 mmol/L Final     CO2   Date Value Ref Range Status   05/08/2018 24.0 22.0 - 31.0 mmol/L Final     Chloride   Date Value Ref Range Status   05/08/2018 100 95 - 110 mmol/L Final     Anion Gap   Date Value Ref Range Status   05/08/2018 14.0 5.0 - 15.0 mmol/L Final     Creatinine   Date Value Ref Range Status   05/08/2018 0.98 0.50 - 1.00 mg/dL Final     BUN   Date Value Ref Range Status   05/08/2018 12 7 - 21 mg/dL Final     BUN/Creatinine Ratio   Date Value Ref Range Status   05/08/2018 12.2 7.0 - 25.0 Final     Calcium   Date Value Ref Range Status   05/08/2018 9.4 8.4 - 10.2 mg/dL Final     eGFR Non  Amer   Date Value Ref Range Status   05/08/2018 63 (L) 64 - 149 mL/min/1.73 Final     Alkaline Phosphatase   Date Value Ref Range Status   05/08/2018 70 38 - 126 U/L Final      Total Protein   Date Value Ref Range Status   05/08/2018 7.9 6.3 - 8.6 g/dL Final     ALT (SGPT)   Date Value Ref Range Status   05/08/2018 46 9 - 52 U/L Final     AST (SGOT)   Date Value Ref Range Status   05/08/2018 30 14 - 36 U/L Final     Total Bilirubin   Date Value Ref Range Status   05/08/2018 0.5 0.2 - 1.3 mg/dL Final     Albumin   Date Value Ref Range Status   05/08/2018 4.40 3.40 - 4.80 g/dL Final     Globulin   Date Value Ref Range Status   05/08/2018 3.5 2.3 - 3.5 gm/dL Final     Lab Results   Component Value Date    WBC 8.35 12/12/2018    HGB 12.6 12/12/2018    HCT 37.5 12/12/2018    MCV 84.5 12/12/2018     12/12/2018     Lab Results   Component Value Date    NEUTROABS 4.78 12/12/2018    IRON 72 12/12/2018    TIBC 379 12/12/2018    LABIRON 19 12/12/2018    FERRITIN 37.40 12/12/2018    VGYXZRCK60 921 12/12/2018    FOLATE 13.00 09/20/2018       Component       INR   Latest Ref Rng & Units       0.9 - 1.1   1/29/2018       3.90 (A)   2/1/2018       2.80 (A)   2/5/2018       3.40 (A)   2/9/2018       2.70 (A)   2/12/2018       3.20 (A)   2/15/2018       3.20 (A)   2/19/2018       5.10 (A)   2/20/2018       3.00 (A)   2/23/2018       2.30 (A)   3/1/2018       3.20 (A)   3/5/2018       3.00 (A)   3/8/2018       2.50 (A)   3/12/2018       3.10 (A)   3/15/2018       2.60 (A)       Anti cardiolipin profile (SO)4/21/2018  [facility]  Component Name Value Ref Range   ANC IGG <9   Comment:  (NOTE)                           Negative:              <15                           Indeterminate:     15 - 20                           Low-Med Positive: >20 - 80                           High Positive:         >80 0 - 14 GPL/mL    ANC IGM 10   Comment:  (NOTE)                           Negative:              <13                           Indeterminate:     13 - 20                           Low-Med Positive: >20 - 80                           High Positive:         >80 0 - 12 MPL/mL    ANC IGA <9    Comment:  (NOTE)                           Negative:              <12                           Indeterminate:     12 - 20                           Low-Med Positive: >20 - 80                           High Positive:         >80  Performed At: [facility]  [address], [place] [id number]  Kanika Tarango PhD Ph:[phone]  Test performed at [address] [place] [address]          Antithrombin III (ATIII) (SO)4/21/2018  [facility]  Component Name Value Ref Range   AT3 95   Comment:  (NOTE)  Direct Xa inhibitor anticoagulants such as rivaroxaban, apixaban and  edoxaban will lead to spuriously elevated antithrombin activity  levels possibly masking a deficiency.  Performed At: [address] [id number]  Savage COHEN MD Ph:[phone]  Test performed at [address] [place] [address] 75 - 135 %   Specimen   Citrated Plasma       FACTOR 5 (V) [place] (SO)4/21/2018  [facility]  Component Name Value Ref Range   RESULTS Comment   Comment:  (NOTE)  Result:  Negative (no mutation found)        Lupus inhibitor screen (SO)4/21/2018  [facility]  Component Name Value Ref Range   PTT-LA 43.6 0.0 - 51.9 s   DRVVT 55.3 (H) 0.0 - 47.0 s   LUPUS REFLEX INTERP Comment:   Comment:  (NOTE)  No lupus anticoagulant was detected. An extended dRVVT that corrects  on mixing with normal plasma can be caused by a deficiency of one of  the common pathway factors (X, V, II or fibrinogen).  Performed At: [address] [id number]  Savage COHEN MD Ph:[phone]  Test performed at [address] [place] [address]     Specimen         Protein C activity (SO)4/21/2018  [facility]  Component Name Value Ref Range   PROTEIN C ACTIVITY 43 (L)   Comment:        Protein S activity (SO)4/21/2018  [facility]  Component Name Value Ref Range   PROTEIN S ACTIVITY 40 (L)   Comment:  (NOTE)        Prothrombin 20210  (SO)4/21/2018  [facility]  Component Name Value Ref Range   RESULTS Comment   Comment:  (NOTE)  NEGATIVE  No mutation identified.                RADIOLOGY DATA:   CT angiogram of the head done at [facility] on April 21, 2018 showed:  Result Impression        Anatomic variants of the [place] as above. No radiographic evidence of vascular occlusion, hemodynamically significant stenosis, or aneurysm are identified.       CT Angiogram of the neck done at [facility] on April 21, 2018 showed:  Result Impression       No hemodynamically significant stenosis identified           Assessment and plan:    1.  Problems with getting therapeutic INR range with Coumadin: Patient states she has been Coumadin since July 2015 secondary to her mechanical mitral valve.    -And patient was here at Coumadin clinic, her INR was never staging therapeutic despite of trying different dose of Coumadin.  -Patient is currently being anticoagulated with Coumadin and intermittent Lovenox under care of Dr. Chandler.    -Most recent INR  on December 12, 2018 was 2.9.  -Patient was evaluated by Dr. Cabrera in [place] for second opinion on July 9, 2018.  He recommend continuing with Coumadin with intermittent Lovenox if required.  -We will ask patient to return to clinic in 4 months with a repeat CBC and anemia workup on that day.    2.  Recurrent TIA:  -Patient is having episodes of TIA without any deficit at present.  -Recent CT angiogram of the head as well as CT angiogram of neck does not show any evidence of blockage.  -Hypercoagulable workup done at [facility] in April 2018 is negative except for low protein C and low protein S level.  But patient was taking Coumadin on that point and that can lower protein C and protein S.  Result of hypercoagulable workup were discussed with patient on June 14, 2018.  -Patient is scheduled to get inpatient evaluation for her recurrent TIA and seizure like activity in [place]  in October 2018    3.  Anemia:  -Resolved.  Hemoglobin is improved to 12.6 today.  Patient is currently taking ferrous sulfate 1 tablet by mouth daily along with vitamin B12 and folic acid.  Recommend continuing with vitamin B12, folic acid and ferrous sulfate for now until next clinic visit in 4 months.     4.  Intracranial aneurysm: Patient was found to have aneurysm which was incidental finding.   Currently being followed by neurosurgeon, no other intervention is planned at this point.     5.  Health maintenance: Patient does not smoke currently.  She is only 38 years of age and not due for colonoscopy at this point.      6. Advance care planning: Patient has advanced directive on file.    7. BMI: Patient's Body mass index is 37.01 kg/m². BMI is higher than reference range.  Patient was notified about her elevated BMI.          Remington Welch MD  12/12/2018  5:28 PM        EMR Dragon/Transcription disclaimer:   Much of this encounter note is an electronic transcription/translation of spoken language to printed text. The electronic translation of spoken language may permit erroneous, or at times, nonsensical words or phrases to be inadvertently transcribed; Although I have reviewed the note for such errors, some may still exist.

## 2018-12-13 ENCOUNTER — TELEPHONE (OUTPATIENT)
Dept: ONCOLOGY | Facility: HOSPITAL | Age: 38
End: 2018-12-13

## 2018-12-13 RX ORDER — FOLIC ACID 1 MG/1
1 TABLET ORAL DAILY
Qty: 90 TABLET | Refills: 1 | Status: SHIPPED | OUTPATIENT
Start: 2018-12-13 | End: 2019-06-19

## 2018-12-13 RX ORDER — LANOLIN ALCOHOL/MO/W.PET/CERES
1000 CREAM (GRAM) TOPICAL DAILY
Qty: 90 TABLET | Refills: 1 | Status: SHIPPED | OUTPATIENT
Start: 2018-12-13 | End: 2019-06-19

## 2018-12-13 NOTE — TELEPHONE ENCOUNTER
Called patient and informed. She states that she is not taking folic acid. Do you want to order that for her?

## 2018-12-13 NOTE — TELEPHONE ENCOUNTER
----- Message from Remington Welch MD sent at 12/12/2018  5:31 PM CST -----  Please let patient know, she needs to continue taking ferrous sulfate, B12 and folic acid for now until next clinic visit.  Thank you

## 2019-04-10 ENCOUNTER — LAB (OUTPATIENT)
Dept: ONCOLOGY | Facility: HOSPITAL | Age: 39
End: 2019-04-10

## 2019-04-10 ENCOUNTER — OFFICE VISIT (OUTPATIENT)
Dept: ONCOLOGY | Facility: CLINIC | Age: 39
End: 2019-04-10

## 2019-04-10 VITALS
OXYGEN SATURATION: 99 % | TEMPERATURE: 98.2 F | SYSTOLIC BLOOD PRESSURE: 136 MMHG | BODY MASS INDEX: 37.38 KG/M2 | RESPIRATION RATE: 18 BRPM | HEART RATE: 83 BPM | WEIGHT: 232.6 LBS | HEIGHT: 66 IN | DIASTOLIC BLOOD PRESSURE: 75 MMHG

## 2019-04-10 DIAGNOSIS — Z51.81 SUBTHERAPEUTIC ANTICOAGULATION: ICD-10-CM

## 2019-04-10 DIAGNOSIS — D64.9 ANEMIA, UNSPECIFIED TYPE: Primary | ICD-10-CM

## 2019-04-10 DIAGNOSIS — D64.9 ANEMIA, UNSPECIFIED TYPE: ICD-10-CM

## 2019-04-10 DIAGNOSIS — Z79.01 SUBTHERAPEUTIC ANTICOAGULATION: ICD-10-CM

## 2019-04-10 LAB
BASOPHILS # BLD AUTO: 0.05 10*3/MM3 (ref 0–0.2)
BASOPHILS NFR BLD AUTO: 0.6 % (ref 0–1.5)
DEPRECATED RDW RBC AUTO: 39.9 FL (ref 37–54)
EOSINOPHIL # BLD AUTO: 0.11 10*3/MM3 (ref 0–0.4)
EOSINOPHIL NFR BLD AUTO: 1.4 % (ref 0.3–6.2)
ERYTHROCYTE [DISTWIDTH] IN BLOOD BY AUTOMATED COUNT: 13.1 % (ref 12.3–15.4)
FERRITIN SERPL-MCNC: 70.03 NG/ML (ref 13–150)
FOLATE SERPL-MCNC: >20 NG/ML (ref 4.78–24.2)
HCT VFR BLD AUTO: 38.5 % (ref 34–46.6)
HGB BLD-MCNC: 12.9 G/DL (ref 12–15.9)
IMM GRANULOCYTES # BLD AUTO: 0.04 10*3/MM3 (ref 0–0.05)
IMM GRANULOCYTES NFR BLD AUTO: 0.5 % (ref 0–0.5)
IRON 24H UR-MRATE: 95 MCG/DL (ref 37–145)
IRON SATN MFR SERPL: 23 % (ref 20–50)
LYMPHOCYTES # BLD AUTO: 2.23 10*3/MM3 (ref 0.7–3.1)
LYMPHOCYTES NFR BLD AUTO: 28 % (ref 19.6–45.3)
MCH RBC QN AUTO: 28.2 PG (ref 26.6–33)
MCHC RBC AUTO-ENTMCNC: 33.5 G/DL (ref 31.5–35.7)
MCV RBC AUTO: 84.1 FL (ref 79–97)
MONOCYTES # BLD AUTO: 0.5 10*3/MM3 (ref 0.1–0.9)
MONOCYTES NFR BLD AUTO: 6.3 % (ref 5–12)
NEUTROPHILS # BLD AUTO: 5.03 10*3/MM3 (ref 1.4–7)
NEUTROPHILS NFR BLD AUTO: 63.2 % (ref 42.7–76)
NRBC BLD AUTO-RTO: 0 /100 WBC (ref 0–0)
PLATELET # BLD AUTO: 430 10*3/MM3 (ref 140–450)
PMV BLD AUTO: 9 FL (ref 6–12)
RBC # BLD AUTO: 4.58 10*6/MM3 (ref 3.77–5.28)
TIBC SERPL-MCNC: 417 MCG/DL (ref 298–536)
TRANSFERRIN SERPL-MCNC: 280 MG/DL (ref 200–360)
VIT B12 BLD-MCNC: 1557 PG/ML (ref 211–946)
WBC NRBC COR # BLD: 7.96 10*3/MM3 (ref 3.4–10.8)

## 2019-04-10 PROCEDURE — 82746 ASSAY OF FOLIC ACID SERUM: CPT | Performed by: INTERNAL MEDICINE

## 2019-04-10 PROCEDURE — 85025 COMPLETE CBC W/AUTO DIFF WBC: CPT | Performed by: INTERNAL MEDICINE

## 2019-04-10 PROCEDURE — 36415 COLL VENOUS BLD VENIPUNCTURE: CPT | Performed by: INTERNAL MEDICINE

## 2019-04-10 PROCEDURE — 82607 VITAMIN B-12: CPT | Performed by: INTERNAL MEDICINE

## 2019-04-10 PROCEDURE — 82728 ASSAY OF FERRITIN: CPT | Performed by: INTERNAL MEDICINE

## 2019-04-10 PROCEDURE — 84466 ASSAY OF TRANSFERRIN: CPT | Performed by: INTERNAL MEDICINE

## 2019-04-10 PROCEDURE — G8417 CALC BMI ABV UP PARAM F/U: HCPCS | Performed by: INTERNAL MEDICINE

## 2019-04-10 PROCEDURE — 99214 OFFICE O/P EST MOD 30 MIN: CPT | Performed by: INTERNAL MEDICINE

## 2019-04-10 PROCEDURE — 83540 ASSAY OF IRON: CPT | Performed by: INTERNAL MEDICINE

## 2019-04-10 PROCEDURE — 1157F ADVNC CARE PLAN IN RCRD: CPT | Performed by: INTERNAL MEDICINE

## 2019-04-10 NOTE — PROGRESS NOTES
DATE OF VISIT: 4/10/2019    REASON FOR VISIT:  Mechanical mitral valve on Coumadin, problem having Coumadin level in the therapeutic range ,Anemia    HISTORY OF PRESENT ILLNESS:    38-year-old female with a past medical history significant for open heart surgery with mitral valve replacement with mechanical valve in July 2015, history of AICD placement of week later after open-heart surgery secondary to tachycardia, congestive heart failure has been on Coumadin since July 2015 secondary to mechanical mitral valve.  Patient was initially seen in consultation on September 12, 2017 for evaluation of problem with Coumadin not being in therapeutic range on a persistent basis.  Subsequently patient was referred to Coumadin clinic for monitoring of INR.   Due to recurrent TIA in subtherapeutic INR in between, patient was referred to [facility] where she was evaluated by Dr. Cabrera on July 9, 2018.  States she had another episode of TIA on August 18, 2018 where she was evaluated at [facility].  Denies any more TIA episodes since August 2018.  Her most recent INR on April 8,2019 was 2.1.   Denies any bleeding.        PAST MEDICAL HISTORY:    Past Medical History:   Diagnosis Date   • AICD (automatic cardioverter/defibrillator) present    • Anticoagulation goal of INR 2.5 to 3.5    • Asthma    • Cardiomyopathy (CMS/HCC)    • Congestive heart failure (CMS/HCC)    • Diabetes mellitus (CMS/HCC)    • Disease of thyroid gland     hypothyroidism    • Enlarged heart    • Hx of mitral valve replacement with mechanical valve     with thrombus 7/2017   • Migraine    • TIA (transient ischemic attack)        SOCIAL HISTORY:    Social History     Tobacco Use   • Smoking status: Former Smoker   • Smokeless tobacco: Never Used   Substance Use Topics   • Alcohol use: No   • Drug use: No       Surgical History :  Past Surgical History:   Procedure Laterality Date   • A-V CARDIAC PACEMAKER INSERTION     • APPENDECTOMY     •  "ATRIAL CARDIAC PACEMAKER INSERTION     • CARDIAC CATHETERIZATION     • CARDIAC SURGERY     • CARDIAC VALVE SURGERY     • CYSTOSCOPY  09/23/2015   • HYSTERECTOMY     • TONSILLECTOMY     • TRANSESOPHAGEAL ECHOCARDIOGRAM (CABRERA)         ALLERGIES:    Allergies   Allergen Reactions   • Lortab [Hydrocodone-Acetaminophen]          FAMILY HISTORY:  Family History   Problem Relation Age of Onset   • Diabetes Father    • Hypertension Father    • Diabetes Maternal Grandmother    • Kidney disease Maternal Grandmother    • Hypertension Maternal Grandmother    • Heart disease Maternal Grandmother    • Stroke Maternal Grandmother    • Thyroid disease Maternal Grandmother    • Cancer Maternal Grandfather    • Kidney disease Paternal Grandmother    • Diabetes Paternal Grandfather            REVIEW OF SYSTEMS:      CONSTITUTIONAL:  Complains of fatigue. Has gained 3 pounds since last clinic visit. Denies any fever, chills or weight loss.      HEENT:  No epistaxis, mouth sores or difficulty swallowing.     RESPIRATORY:  No new shortness of breath. No new cough or hemoptysis.     CARDIOVASCULAR:  Complains of chest pain with exertion.  No palpitation.     GASTROINTESTINAL:   No abdominal pain nausea, vomiting or blood in the stool.     GENITOURINARY:Complains of intermittent vaginal spotting.    No Dysuria or Hematuria.     MUSCULOSKELETAL:  No new back pain or arthralgia..     LYMPHATICS:  Denies any abnormal swollen glands anywhere in the body.     NEUROLOGICAL : Had seizure with most recent episode of TIA in August 2018.  Complains of tingling and numbness affecting the right thigh since episode of hemiparesis in July 2017.  No new headaches or dizziness.   No  balance problems.     SKIN:  Denies any new skin rash.        PHYSICAL EXAMINATION:      VITAL SIGNS:  /75   Pulse 83   Temp 98.2 °F (36.8 °C) (Temporal)   Resp 18   Ht 167.6 cm (65.98\")   Wt 106 kg (232 lb 9.6 oz)   LMP 04/30/2015 (Within Months) Comment: Partial " Hysterectomy  SpO2 99%   BMI 37.56 kg/m²       04/10/19  1122   Weight: 106 kg (232 lb 9.6 oz)     ECOG performance status: 2    GENERAL:  Not in any distress.Obese female.    HEENT:  Normocephalic, Atraumatic.Mild Conjunctival pallor. No icterus. Extraocular Movements Intact. No Facial Asymmetry noted.    NECK:  No adenopathy. No JVD.  Trachea in midline.    RESPIRATORY:  Fair air entry bilateral. No rhonchi or wheezing.    CARDIOVASCULAR:  S1, S2. Regular rate and rhythm. No murmur or gallop appreciated.  Prosthetic valve sound heard.    ABDOMEN:  Soft, obese, nontender. Bowel sounds present in all four quadrants.  No hepatosplenomegaly appreciated.    MUSCULOSKELTAL:  No edema.No Calf Tenderness.  Decreased range of motion.    NEUROLOGIC:  Alert, awake and oriented ×3.  No  Motor  deficit appreciated.     SKIN: No new skin lesions    LYMPHATICS: No new lymph node enlargement in neck or supraclavicular area.            DIAGNOSTIC DATA:    Glucose   Date Value Ref Range Status   05/08/2018 179 (H) 60 - 100 mg/dL Final     Sodium   Date Value Ref Range Status   05/08/2018 138 137 - 145 mmol/L Final     Potassium   Date Value Ref Range Status   05/08/2018 3.5 3.5 - 5.1 mmol/L Final     CO2   Date Value Ref Range Status   05/08/2018 24.0 22.0 - 31.0 mmol/L Final     Chloride   Date Value Ref Range Status   05/08/2018 100 95 - 110 mmol/L Final     Anion Gap   Date Value Ref Range Status   05/08/2018 14.0 5.0 - 15.0 mmol/L Final     Creatinine   Date Value Ref Range Status   05/08/2018 0.98 0.50 - 1.00 mg/dL Final     BUN   Date Value Ref Range Status   05/08/2018 12 7 - 21 mg/dL Final     BUN/Creatinine Ratio   Date Value Ref Range Status   05/08/2018 12.2 7.0 - 25.0 Final     Calcium   Date Value Ref Range Status   05/08/2018 9.4 8.4 - 10.2 mg/dL Final     eGFR Non  Amer   Date Value Ref Range Status   05/08/2018 63 (L) 64 - 149 mL/min/1.73 Final     Alkaline Phosphatase   Date Value Ref Range Status    05/08/2018 70 38 - 126 U/L Final     Total Protein   Date Value Ref Range Status   05/08/2018 7.9 6.3 - 8.6 g/dL Final     ALT (SGPT)   Date Value Ref Range Status   05/08/2018 46 9 - 52 U/L Final     AST (SGOT)   Date Value Ref Range Status   05/08/2018 30 14 - 36 U/L Final     Total Bilirubin   Date Value Ref Range Status   05/08/2018 0.5 0.2 - 1.3 mg/dL Final     Albumin   Date Value Ref Range Status   05/08/2018 4.40 3.40 - 4.80 g/dL Final     Globulin   Date Value Ref Range Status   05/08/2018 3.5 2.3 - 3.5 gm/dL Final     Lab Results   Component Value Date    WBC 7.96 04/10/2019    HGB 12.9 04/10/2019    HCT 38.5 04/10/2019    MCV 84.1 04/10/2019     04/10/2019     Lab Results   Component Value Date    NEUTROABS 5.03 04/10/2019    IRON 95 04/10/2019    TIBC 417 04/10/2019    LABIRON 23 04/10/2019    FERRITIN 70.03 04/10/2019    AIRBHVUK94 921 12/12/2018    FOLATE 9.88 12/12/2018       Component       INR   Latest Ref Rng & Units       0.9 - 1.1   1/29/2018       3.90 (A)   2/1/2018       2.80 (A)   2/5/2018       3.40 (A)   2/9/2018       2.70 (A)   2/12/2018       3.20 (A)   2/15/2018       3.20 (A)   2/19/2018       5.10 (A)   2/20/2018       3.00 (A)   2/23/2018       2.30 (A)   3/1/2018       3.20 (A)   3/5/2018       3.00 (A)   3/8/2018       2.50 (A)   3/12/2018       3.10 (A)   3/15/2018       2.60 (A)       Anti cardiolipin profile (SO)4/21/2018  [facility]  Component Name Value Ref Range   ANC IGG <9   Comment:  (NOTE)                           Negative:              <15                           Indeterminate:     15 - 20                           Low-Med Positive: >20 - 80                           High Positive:         >80 0 - 14 GPL/mL    ANC IGM 10   Comment:  (NOTE)                           Negative:              <13                           Indeterminate:     13 - 20                           Low-Med Positive: >20 - 80                           High Positive:         >80 0 -  12 MPL/mL    ANC IGA <9   Comment:  (NOTE)                           Negative:              <12                           Indeterminate:     12 - 20                           Low-Med Positive: >20 - 80                           High Positive:         >80  Performed At: [facility]  [address] [place] [address] [id number]  Kanika Tarango PhD Ph:[phone]  Test performed at [address] [place] [address]          Antithrombin III (ATIII) (SO)4/21/2018  [facility]  Component Name Value Ref Range   AT3 95   Comment:  (NOTE)  Direct Xa inhibitor anticoagulants such as rivaroxaban, apixaban and  edoxaban will lead to spuriously elevated antithrombin activity  levels possibly masking a deficiency.  Performed At: [address] [id number]  Savage COHEN MD Ph:[phone]  Test performed at [address] [place] [address] 75 - 135 %   Specimen   Citrated Plasma       FACTOR 5 (V) [place] (SO)4/21/2018  [facility]  Component Name Value Ref Range   RESULTS Comment   Comment:  (NOTE)  Result:  Negative (no mutation found)        Lupus inhibitor screen (SO)4/21/2018  [facility]  Component Name Value Ref Range   PTT-LA 43.6 0.0 - 51.9 s   DRVVT 55.3 (H) 0.0 - 47.0 s   LUPUS REFLEX INTERP Comment:   Comment:  (NOTE)  No lupus anticoagulant was detected. An extended dRVVT that corrects  on mixing with normal plasma can be caused by a deficiency of one of  the common pathway factors (X, V, II or fibrinogen).  Performed At: [address] [id number]  Savage COHEN MD Ph:[phone]  Test performed at [address] [place] [address]     Specimen         Protein C activity (SO)4/21/2018  [facility]  Component Name Value Ref Range   PROTEIN C ACTIVITY 43 (L)   Comment:        Protein S activity (SO)4/21/2018  [facility]  Component Name Value Ref Range   PROTEIN S ACTIVITY 40 (L)   Comment:  (NOTE)         Prothrombin [address]38 (SO)4/21/2018  [facility]  Component Name Value Ref Range   RESULTS Comment   Comment:  (NOTE)  NEGATIVE  No mutation identified.                RADIOLOGY DATA:   CT angiogram of the head done at [facility] on April 21, 2018 showed:  Result Impression        Anatomic variants of the [place] of [place] as above. No radiographic evidence of vascular occlusion, hemodynamically significant stenosis, or aneurysm are identified.       CT Angiogram of the neck done at [facility] on April 21, 2018 showed:  Result Impression       No hemodynamically significant stenosis identified           Assessment and plan:    1.  Problems with getting therapeutic INR range with Coumadin: Patient states she has been Coumadin since July 2015 secondary to her mechanical mitral valve.    -And patient was here at Coumadin clinic, her INR was never staging therapeutic despite of trying different dose of Coumadin.  -Patient is currently being anticoagulated with Coumadin and intermittent Lovenox under care of Dr. Chandler.    -Most recent INR  on April 8,2019 was 2.1.  She is scheduled to get another INR check today.  -Patient was evaluated by Dr. Cabrera in [place] for second opinion on July 9, 2018.  He recommend continuing with Coumadin with intermittent Lovenox if required.  -We will ask patient to return to clinic in 4 months with a repeat CBC and anemia workup on that day.    2.  Recurrent TIA:  -Patient is having episodes of TIA without any deficit at present.  -Recent CT angiogram of the head as well as CT angiogram of neck does not show any evidence of blockage.  -Hypercoagulable workup done at [facility] in April 2018 is negative except for low protein C and low protein S level.  But patient was taking Coumadin on that point and that can lower protein C and protein S.  Result of hypercoagulable workup were discussed with patient on June 14, 2018.  -Patient is scheduled to get inpatient  evaluation for her recurrent TIA and seizure like activity in [place] in October 2018    3.  Anemia:  -Resolved.  Hemoglobin is improved to 12.9 today.  Patient is currently taking ferrous sulfate 1 tablet by mouth daily along with vitamin B12 and folic acid.  Recommend continuing with vitamin B12, folic acid and ferrous sulfate for now until next clinic visit in 4 months.     4.  Intracranial aneurysm: Patient was found to have aneurysm which was incidental finding.   Currently being followed by neurosurgeon, no other intervention is planned at this point.     5.  Health maintenance: Patient does not smoke currently.  She is only 38 years of age and not due for colonoscopy at this point.      6. Advance care planning: Patient has advanced directive on file.    7. BMI: Patient's Body mass index is 37.56 kg/m². BMI is higher than reference range.  Patient was notified about her elevated BMI.          Remington Welch MD  4/10/2019  7:17 PM        EMR Dragon/Transcription disclaimer:   Much of this encounter note is an electronic transcription/translation of spoken language to printed text. The electronic translation of spoken language may permit erroneous, or at times, nonsensical words or phrases to be inadvertently transcribed; Although I have reviewed the note for such errors, some may still exist.

## 2019-04-11 ENCOUNTER — TELEPHONE (OUTPATIENT)
Dept: ONCOLOGY | Facility: HOSPITAL | Age: 39
End: 2019-04-11

## 2019-04-11 NOTE — TELEPHONE ENCOUNTER
----- Message from Remington Welch MD sent at 4/11/2019  7:57 AM CDT -----  Please let patient know, she needs to continue taking ferrous sulfate supplement.  She can stop B12 and folic acid supplement.  Thank you

## 2019-05-31 ENCOUNTER — OFFICE VISIT (OUTPATIENT)
Dept: SURGERY | Facility: CLINIC | Age: 39
End: 2019-05-31

## 2019-05-31 VITALS
TEMPERATURE: 98.3 F | WEIGHT: 239 LBS | SYSTOLIC BLOOD PRESSURE: 138 MMHG | DIASTOLIC BLOOD PRESSURE: 80 MMHG | HEIGHT: 66 IN | BODY MASS INDEX: 38.41 KG/M2 | HEART RATE: 85 BPM

## 2019-05-31 DIAGNOSIS — K43.9 VENTRAL HERNIA WITHOUT OBSTRUCTION OR GANGRENE: Primary | ICD-10-CM

## 2019-05-31 PROCEDURE — 99203 OFFICE O/P NEW LOW 30 MIN: CPT | Performed by: SURGERY

## 2019-05-31 RX ORDER — BUPIVACAINE HCL/0.9 % NACL/PF 0.1 %
2 PLASTIC BAG, INJECTION (ML) EPIDURAL ONCE
Status: CANCELLED | OUTPATIENT
Start: 2019-07-12 | End: 2019-05-31

## 2019-05-31 RX ORDER — SODIUM CHLORIDE 9 MG/ML
100 INJECTION, SOLUTION INTRAVENOUS CONTINUOUS
Status: CANCELLED | OUTPATIENT
Start: 2019-07-12

## 2019-06-03 NOTE — PROGRESS NOTES
CHIEF COMPLAINT:    Symptomatic ventral hernia    HISTORY OF PRESENT ILLNESS:    Frances Motta is a 39 y.o. female who has a known upper abdominal hernia for many years.  It has gradually gotten larger. She notes dull pain in the area which increases with activity.  She had previously seen Dr. Price about 5 years ago to discuss repair but decided to wait. The hernia has enlarged since that time.     She has a complex cardiac history and is on Coumadin following valve replacement.  She has been off of Coumadin and on Lovenox previously for other procedures.    Past Medical History:   Diagnosis Date   • AICD (automatic cardioverter/defibrillator) present    • Anticoagulation goal of INR 2.5 to 3.5    • Asthma    • Cardiomyopathy (CMS/HCC)    • Congestive heart failure (CMS/HCC)    • Diabetes mellitus (CMS/HCC)    • Disease of thyroid gland     hypothyroidism    • Enlarged heart    • Hx of mitral valve replacement with mechanical valve     with thrombus 7/2017   • Migraine    • TIA (transient ischemic attack)        Past Surgical History:   Procedure Laterality Date   • A-V CARDIAC PACEMAKER INSERTION     • APPENDECTOMY     • ATRIAL CARDIAC PACEMAKER INSERTION     • CARDIAC CATHETERIZATION     • CARDIAC SURGERY     • CARDIAC VALVE SURGERY     • CYSTOSCOPY  09/23/2015   • HYSTERECTOMY     • TONSILLECTOMY     • TRANSESOPHAGEAL ECHOCARDIOGRAM (CABRERA)         Prior to Admission medications    Medication Sig Start Date End Date Taking? Authorizing Provider   atorvastatin (LIPITOR) 80 MG tablet Take 80 mg by mouth Daily.   Yes Provider, MD Caleb   docusate sodium (COLACE) 100 MG capsule Take 1 capsule by mouth 2 (Two) Times a Day As Needed for Constipation. 6/14/18  Yes Remington Welch MD   dronedarone (MULTAQ) 400 MG tablet Take 1 tablet by mouth 2 (Two) Times a Day With Meals. 8/30/17  Yes Wallace Haro MD   fenofibrate 160 MG tablet Take 1 tablet by mouth Daily. 5/19/17  Yes Casandra Pedersen DO   ferrous  sulfate 325 (65 FE) MG tablet Take 1 tablet by mouth Daily With Breakfast. 10/23/18  Yes Remington Welch MD   folic acid (FOLVITE) 1 MG tablet Take 1 tablet by mouth Daily. 12/13/18  Yes Remington Welch MD   Insulin Glargine (LANTUS SOLOSTAR) 100 UNIT/ML injection pen Inject 20 Units under the skin Every Night.  Patient taking differently: Inject 30 Units under the skin Every Night. 11/13/17  Yes Casandra Pedersen DO   levothyroxine (SYNTHROID, LEVOTHROID) 50 MCG tablet Take 1 tablet by mouth Daily. 6/1/17  Yes Casandra Pedersen DO   magnesium oxide (MAGOX) 400 (241.3 Mg) MG tablet tablet Take 400 mg by mouth Daily.   Yes Provider, MD Caleb   metoprolol tartrate (LOPRESSOR) 25 MG tablet Take 0.5 tablets by mouth 2 (Two) Times a Day. 8/30/17  Yes Wallace Haro MD   pantoprazole (PROTONIX) 40 MG EC tablet Take 1 tablet by mouth Daily. 5/19/17  Yes Casandra Pedersen DO   vitamin B-12 (CYANOCOBALAMIN) 1000 MCG tablet Take 1 tablet by mouth Daily. 12/13/18  Yes Remington Welch MD   warfarin (COUMADIN) 5 MG tablet Take 1 tablet by mouth Dose Adjusted By Provider As Needed. 2 po Monday through Thursday Friday sat sun 3 tabs 5/19/17  Yes Casandra Pedersen DO       Allergies   Allergen Reactions   • Acetaminophen GI Intolerance   • Hydrocodone GI Intolerance   • Hydrocodone-Acetaminophen Nausea And Vomiting and Nausea Only   • Lortab [Hydrocodone-Acetaminophen]        Family History   Problem Relation Age of Onset   • Diabetes Father    • Hypertension Father    • Diabetes Maternal Grandmother    • Kidney disease Maternal Grandmother    • Hypertension Maternal Grandmother    • Heart disease Maternal Grandmother    • Stroke Maternal Grandmother    • Thyroid disease Maternal Grandmother    • Cancer Maternal Grandfather    • Kidney disease Paternal Grandmother    • Diabetes Paternal Grandfather        Social History     Socioeconomic History   • Marital status:      Spouse name: Not on file   • Number of children:  "Not on file   • Years of education: Not on file   • Highest education level: Not on file   Tobacco Use   • Smoking status: Former Smoker   • Smokeless tobacco: Never Used   Substance and Sexual Activity   • Alcohol use: No   • Drug use: No   • Sexual activity: Defer       Review of Systems   Constitutional: Negative for appetite change, chills, fever and unexpected weight change.   HENT: Negative for hearing loss, nosebleeds and trouble swallowing.    Eyes: Negative for visual disturbance.   Respiratory: Negative for apnea, cough, choking, chest tightness, shortness of breath, wheezing and stridor.    Cardiovascular: Negative for chest pain, palpitations and leg swelling.   Gastrointestinal: Positive for abdominal pain. Negative for abdominal distention, blood in stool, constipation, diarrhea, nausea and vomiting.        Heartburn   Endocrine: Positive for heat intolerance. Negative for cold intolerance, polydipsia, polyphagia and polyuria.   Genitourinary: Positive for frequency. Negative for difficulty urinating, dysuria, hematuria and urgency.   Musculoskeletal: Negative for arthralgias, back pain, myalgias and neck pain.   Skin: Negative for color change, pallor and rash.   Allergic/Immunologic: Negative for immunocompromised state.   Neurological: Positive for headaches. Negative for dizziness, seizures, syncope, light-headedness and numbness.   Hematological: Negative for adenopathy.   Psychiatric/Behavioral: Negative for suicidal ideas. The patient is not nervous/anxious.        Objective     /80   Pulse 85   Temp 98.3 °F (36.8 °C) (Oral)   Ht 167.6 cm (66\")   Wt 108 kg (239 lb)   LMP 04/30/2015 (Within Months) Comment: Partial Hysterectomy  BMI 38.58 kg/m²     Physical Exam   Constitutional: She is oriented to person, place, and time. She appears well-developed and well-nourished. No distress.   HENT:   Head: Normocephalic and atraumatic.   Eyes: Conjunctivae and EOM are normal. Pupils are equal, " round, and reactive to light. Right eye exhibits no discharge. Left eye exhibits no discharge.   Neck: Normal range of motion. Neck supple. No JVD present. No tracheal deviation present. No thyromegaly present.   Cardiovascular: Normal rate, regular rhythm and normal heart sounds. Exam reveals no gallop and no friction rub.   No murmur heard.  Pulmonary/Chest: Effort normal and breath sounds normal. No respiratory distress. She has no wheezes. She has no rales. She exhibits no tenderness.   Abdominal: Soft. She exhibits no distension and no mass. There is no tenderness. There is no rebound and no guarding. No hernia.       Musculoskeletal: Normal range of motion. She exhibits no edema, tenderness or deformity.   Neurological: She is alert and oriented to person, place, and time. No cranial nerve deficit.   Skin: Skin is warm and dry. No rash noted. She is not diaphoretic. No erythema. No pallor.   Psychiatric: She has a normal mood and affect. Her behavior is normal. Judgment and thought content normal.       DIAGNOSTIC DATA:    Prior CT of the abdomen from 2016 reviewed showing fat containing ventral hernia    ASSESSMENT:    Ventral hernia    PLAN:    She has a hernia which she would like to have repaired.  I have discussed with her the risks and benefits of both laparoscopic and open repairs.  Will plan for an open repair with mesh. The risks and benefits of Open Ventral Hernia Repair with mesh were discussed.  She will need to be off of Coumadin prior to surgery and on Lovenox.  Will coordinate this with her heart doctor.  She would like surgery on 6/24/19.            This document has been electronically signed by Tuan Hicks MD on Shanna 3, 2019 9:24 AM

## 2019-06-19 ENCOUNTER — APPOINTMENT (OUTPATIENT)
Dept: PREADMISSION TESTING | Facility: HOSPITAL | Age: 39
End: 2019-06-19

## 2019-06-19 VITALS
HEIGHT: 66 IN | DIASTOLIC BLOOD PRESSURE: 82 MMHG | SYSTOLIC BLOOD PRESSURE: 140 MMHG | BODY MASS INDEX: 38.41 KG/M2 | HEART RATE: 88 BPM | WEIGHT: 239 LBS | OXYGEN SATURATION: 99 % | RESPIRATION RATE: 16 BRPM

## 2019-06-19 LAB — MRSA DNA SPEC QL NAA+PROBE: NEGATIVE

## 2019-06-19 PROCEDURE — 93005 ELECTROCARDIOGRAM TRACING: CPT

## 2019-06-19 PROCEDURE — 93010 ELECTROCARDIOGRAM REPORT: CPT | Performed by: INTERNAL MEDICINE

## 2019-06-19 PROCEDURE — 87641 MR-STAPH DNA AMP PROBE: CPT | Performed by: SURGERY

## 2019-06-19 RX ORDER — WARFARIN SODIUM 5 MG/1
5 TABLET ORAL NIGHTLY
COMMUNITY

## 2019-06-19 RX ORDER — ASPIRIN 81 MG/1
81 TABLET ORAL NIGHTLY
COMMUNITY

## 2019-06-19 RX ORDER — DOCUSATE SODIUM 100 MG/1
100 CAPSULE, LIQUID FILLED ORAL 2 TIMES DAILY
COMMUNITY

## 2019-06-19 RX ORDER — FENOFIBRATE 160 MG/1
160 TABLET ORAL NIGHTLY
COMMUNITY

## 2019-06-19 RX ORDER — GLYBURIDE 5 MG/1
5 TABLET ORAL 2 TIMES DAILY WITH MEALS
COMMUNITY

## 2019-06-19 RX ORDER — INSULIN GLARGINE 100 [IU]/ML
30 INJECTION, SOLUTION SUBCUTANEOUS NIGHTLY
COMMUNITY

## 2019-06-19 NOTE — PAT
Chlorhexidine scrub given with instruction sheet.  Instruction reviewed in PAT, understanding verbalized.    Patient states she has been given instruction on stopping ASA and coumadin and the use of lovenox prior to surgery.

## 2019-06-19 NOTE — DISCHARGE INSTRUCTIONS
[facility]  Pre-op Information and Guidelines    You will be called after 2 p.m. the day before your surgery (Friday for Monday surgery) and notified of your time for arrival and approximate surgery time.  If you have not received a call by 4P.M., please contact Same Day Surgery at [phone] of if outside [facility] call [phone].    Please Follow these Important Safety Guidelines:    • The morning of your procedure, take only the medications listed below with   A sip of water:_____________________________________________       ______________________________________________    • DO NOT eat or drink anything after 12:00 midnight the night before surgery  Specific instructions concerning drinking clear liquids will be discussed during  the pre-surgery instruction call the day before your surgery.    • If you take a blood thinner (ex. Plavix, Coumadin, aspirin), ask your doctor when to stop it before surgery  STOP DATE: _________________    • Only 2 visitors are allowed in patient rooms at a time  Your visitors will be asked to wait in the lobby until the admission process is complete with the exception of a parent with a child and patients in need of special assistance.    • YOU CANNOT DRIVE YOURSELF HOME  You must be accompanied by someone who will be responsible for driving you home after surgery and for your care at home.    • DO NOT chew gum, use breath mints, hard candy, or smoke the day of surgery  • DO NOT drink alcohol for at least 24 hours before your surgery  • DO NOT wear any jewelry and remove all body piercing before coming to the hospital  • DO NOT wear make-up to the hospital  • If you are having surgery on an extremity (arm/leg/foot) remove nail polish/artificial nails on the surgical side  • Clothing, glasses, contacts, dentures, and hairpieces must be removed before surgery  • Bathe the night before or the morning of your surgery and do not use powders/lotions on  skin.

## 2019-07-11 ENCOUNTER — TELEPHONE (OUTPATIENT)
Dept: SURGERY | Facility: CLINIC | Age: 39
End: 2019-07-11

## 2019-07-12 ENCOUNTER — ANESTHESIA EVENT (OUTPATIENT)
Dept: PERIOP | Facility: HOSPITAL | Age: 39
End: 2019-07-12

## 2019-07-12 ENCOUNTER — HOSPITAL ENCOUNTER (OUTPATIENT)
Facility: HOSPITAL | Age: 39
Discharge: HOME | End: 2019-07-13
Attending: SURGERY | Admitting: SURGERY

## 2019-07-12 ENCOUNTER — ANESTHESIA (OUTPATIENT)
Dept: PERIOP | Facility: HOSPITAL | Age: 39
End: 2019-07-12

## 2019-07-12 DIAGNOSIS — K43.9 VENTRAL HERNIA WITHOUT OBSTRUCTION OR GANGRENE: ICD-10-CM

## 2019-07-12 LAB
GLUCOSE BLDC GLUCOMTR-MCNC: 152 MG/DL (ref 70–130)
GLUCOSE BLDC GLUCOMTR-MCNC: 180 MG/DL (ref 70–130)
GLUCOSE BLDC GLUCOMTR-MCNC: 187 MG/DL (ref 70–130)
GLUCOSE BLDC GLUCOMTR-MCNC: 231 MG/DL (ref 70–130)
INR PPP: 1.03 (ref 0.8–1.2)
PROTHROMBIN TIME: 13.3 SECONDS (ref 11.1–15.3)

## 2019-07-12 PROCEDURE — 94799 UNLISTED PULMONARY SVC/PX: CPT

## 2019-07-12 PROCEDURE — G0378 HOSPITAL OBSERVATION PER HR: HCPCS

## 2019-07-12 PROCEDURE — 85610 PROTHROMBIN TIME: CPT | Performed by: SURGERY

## 2019-07-12 PROCEDURE — 25010000002 CEFOXITIN: Performed by: SURGERY

## 2019-07-12 PROCEDURE — C1781 MESH (IMPLANTABLE): HCPCS | Performed by: SURGERY

## 2019-07-12 PROCEDURE — 25010000002 MIDAZOLAM PER 1 MG: Performed by: NURSE ANESTHETIST, CERTIFIED REGISTERED

## 2019-07-12 PROCEDURE — 25010000002 DEXAMETHASONE PER 1 MG: Performed by: NURSE ANESTHETIST, CERTIFIED REGISTERED

## 2019-07-12 PROCEDURE — 49560 PR REPAIR INCISIONAL HERNIA,REDUCIBLE: CPT | Performed by: SURGERY

## 2019-07-12 PROCEDURE — 63710000001 INSULIN ASPART PER 5 UNITS: Performed by: SURGERY

## 2019-07-12 PROCEDURE — 25010000002 SUCCINYLCHOLINE PER 20 MG: Performed by: NURSE ANESTHETIST, CERTIFIED REGISTERED

## 2019-07-12 PROCEDURE — 25010000002 HYDROMORPHONE PER 4 MG: Performed by: NURSE ANESTHETIST, CERTIFIED REGISTERED

## 2019-07-12 PROCEDURE — 25010000002 CEFOXITIN PER 1 G: Performed by: SURGERY

## 2019-07-12 PROCEDURE — 25010000002 PROPOFOL 10 MG/ML EMULSION: Performed by: NURSE ANESTHETIST, CERTIFIED REGISTERED

## 2019-07-12 PROCEDURE — 82962 GLUCOSE BLOOD TEST: CPT

## 2019-07-12 PROCEDURE — 63710000001 INSULIN DETEMIR PER 5 UNITS: Performed by: SURGERY

## 2019-07-12 PROCEDURE — 25010000002 HALOPERIDOL LACTATE PER 5 MG: Performed by: NURSE ANESTHETIST, CERTIFIED REGISTERED

## 2019-07-12 PROCEDURE — 25010000002 NEOSTIGMINE 4 MG/4ML SOLUTION PREFILLED SYRINGE: Performed by: NURSE ANESTHETIST, CERTIFIED REGISTERED

## 2019-07-12 PROCEDURE — 99213 OFFICE O/P EST LOW 20 MIN: CPT | Performed by: SURGERY

## 2019-07-12 PROCEDURE — 49568 PR IMPLANT MESH HERNIA REPAIR/DEBRIDEMENT CLOSURE: CPT | Performed by: SURGERY

## 2019-07-12 DEVICE — VENTRALIGHT ST MESH
Type: IMPLANTABLE DEVICE | Site: ABDOMEN | Status: FUNCTIONAL
Brand: VENTRALIGHT ST

## 2019-07-12 RX ORDER — PANTOPRAZOLE SODIUM 40 MG/1
40 TABLET, DELAYED RELEASE ORAL DAILY
Status: DISCONTINUED | OUTPATIENT
Start: 2019-07-13 | End: 2019-07-13 | Stop reason: HOSPADM

## 2019-07-12 RX ORDER — ACETAMINOPHEN 650 MG/1
650 SUPPOSITORY RECTAL ONCE AS NEEDED
Status: DISCONTINUED | OUTPATIENT
Start: 2019-07-12 | End: 2019-07-12 | Stop reason: HOSPADM

## 2019-07-12 RX ORDER — HYDROMORPHONE HCL 110MG/55ML
PATIENT CONTROLLED ANALGESIA SYRINGE INTRAVENOUS AS NEEDED
Status: DISCONTINUED | OUTPATIENT
Start: 2019-07-12 | End: 2019-07-12 | Stop reason: SURG

## 2019-07-12 RX ORDER — BUPIVACAINE HYDROCHLORIDE AND EPINEPHRINE 5; 5 MG/ML; UG/ML
INJECTION, SOLUTION EPIDURAL; INTRACAUDAL; PERINEURAL AS NEEDED
Status: DISCONTINUED | OUTPATIENT
Start: 2019-07-12 | End: 2019-07-13 | Stop reason: HOSPADM

## 2019-07-12 RX ORDER — NALOXONE HCL 0.4 MG/ML
0.1 VIAL (ML) INJECTION
Status: DISCONTINUED | OUTPATIENT
Start: 2019-07-12 | End: 2019-07-13 | Stop reason: HOSPADM

## 2019-07-12 RX ORDER — SODIUM CHLORIDE 9 MG/ML
100 INJECTION, SOLUTION INTRAVENOUS CONTINUOUS
Status: DISCONTINUED | OUTPATIENT
Start: 2019-07-12 | End: 2019-07-13 | Stop reason: HOSPADM

## 2019-07-12 RX ORDER — ONDANSETRON 4 MG/1
4 TABLET, FILM COATED ORAL EVERY 6 HOURS PRN
Status: DISCONTINUED | OUTPATIENT
Start: 2019-07-12 | End: 2019-07-13 | Stop reason: HOSPADM

## 2019-07-12 RX ORDER — DEXAMETHASONE SODIUM PHOSPHATE 4 MG/ML
INJECTION, SOLUTION INTRA-ARTICULAR; INTRALESIONAL; INTRAMUSCULAR; INTRAVENOUS; SOFT TISSUE AS NEEDED
Status: DISCONTINUED | OUTPATIENT
Start: 2019-07-12 | End: 2019-07-12 | Stop reason: SURG

## 2019-07-12 RX ORDER — ALBUTEROL SULFATE 90 UG/1
AEROSOL, METERED RESPIRATORY (INHALATION) AS NEEDED
Status: DISCONTINUED | OUTPATIENT
Start: 2019-07-12 | End: 2019-07-12 | Stop reason: SURG

## 2019-07-12 RX ORDER — SUCCINYLCHOLINE CHLORIDE 20 MG/ML
INJECTION INTRAMUSCULAR; INTRAVENOUS AS NEEDED
Status: DISCONTINUED | OUTPATIENT
Start: 2019-07-12 | End: 2019-07-12 | Stop reason: SURG

## 2019-07-12 RX ORDER — NEOSTIGMINE METHYLSULFATE 4 MG/4 ML
SYRINGE (ML) INTRAVENOUS AS NEEDED
Status: DISCONTINUED | OUTPATIENT
Start: 2019-07-12 | End: 2019-07-12 | Stop reason: SURG

## 2019-07-12 RX ORDER — DIPHENHYDRAMINE HYDROCHLORIDE 50 MG/ML
12.5 INJECTION INTRAMUSCULAR; INTRAVENOUS
Status: DISCONTINUED | OUTPATIENT
Start: 2019-07-12 | End: 2019-07-12 | Stop reason: HOSPADM

## 2019-07-12 RX ORDER — HALOPERIDOL 5 MG/ML
2 INJECTION INTRAMUSCULAR EVERY 4 HOURS PRN
Status: DISCONTINUED | OUTPATIENT
Start: 2019-07-12 | End: 2019-07-12

## 2019-07-12 RX ORDER — ONDANSETRON 2 MG/ML
4 INJECTION INTRAMUSCULAR; INTRAVENOUS ONCE AS NEEDED
Status: DISCONTINUED | OUTPATIENT
Start: 2019-07-12 | End: 2019-07-12 | Stop reason: HOSPADM

## 2019-07-12 RX ORDER — DOCUSATE SODIUM 100 MG/1
100 CAPSULE, LIQUID FILLED ORAL 2 TIMES DAILY PRN
Status: DISCONTINUED | OUTPATIENT
Start: 2019-07-12 | End: 2019-07-13 | Stop reason: HOSPADM

## 2019-07-12 RX ORDER — FERROUS SULFATE TAB EC 324 MG (65 MG FE EQUIVALENT) 324 (65 FE) MG
324 TABLET DELAYED RESPONSE ORAL
Status: DISCONTINUED | OUTPATIENT
Start: 2019-07-12 | End: 2019-07-13 | Stop reason: HOSPADM

## 2019-07-12 RX ORDER — ACETAMINOPHEN 325 MG/1
650 TABLET ORAL ONCE AS NEEDED
Status: DISCONTINUED | OUTPATIENT
Start: 2019-07-12 | End: 2019-07-12 | Stop reason: HOSPADM

## 2019-07-12 RX ORDER — LEVOTHYROXINE SODIUM 0.05 MG/1
50 TABLET ORAL DAILY
Status: DISCONTINUED | OUTPATIENT
Start: 2019-07-12 | End: 2019-07-13 | Stop reason: HOSPADM

## 2019-07-12 RX ORDER — ATORVASTATIN CALCIUM 40 MG/1
80 TABLET, FILM COATED ORAL NIGHTLY
Status: DISCONTINUED | OUTPATIENT
Start: 2019-07-12 | End: 2019-07-13 | Stop reason: HOSPADM

## 2019-07-12 RX ORDER — ONDANSETRON 2 MG/ML
4 INJECTION INTRAMUSCULAR; INTRAVENOUS EVERY 6 HOURS PRN
Status: DISCONTINUED | OUTPATIENT
Start: 2019-07-12 | End: 2019-07-13 | Stop reason: HOSPADM

## 2019-07-12 RX ORDER — SODIUM CHLORIDE, SODIUM GLUCONATE, SODIUM ACETATE, POTASSIUM CHLORIDE, AND MAGNESIUM CHLORIDE 526; 502; 368; 37; 30 MG/100ML; MG/100ML; MG/100ML; MG/100ML; MG/100ML
INJECTION, SOLUTION INTRAVENOUS CONTINUOUS PRN
Status: DISCONTINUED | OUTPATIENT
Start: 2019-07-12 | End: 2019-07-12 | Stop reason: SURG

## 2019-07-12 RX ORDER — PROMETHAZINE HYDROCHLORIDE 25 MG/1
25 SUPPOSITORY RECTAL ONCE AS NEEDED
Status: DISCONTINUED | OUTPATIENT
Start: 2019-07-12 | End: 2019-07-12 | Stop reason: HOSPADM

## 2019-07-12 RX ORDER — GLIPIZIDE 5 MG/1
5 TABLET ORAL
Status: DISCONTINUED | OUTPATIENT
Start: 2019-07-12 | End: 2019-07-13 | Stop reason: HOSPADM

## 2019-07-12 RX ORDER — DEXAMETHASONE SODIUM PHOSPHATE 4 MG/ML
8 INJECTION, SOLUTION INTRA-ARTICULAR; INTRALESIONAL; INTRAMUSCULAR; INTRAVENOUS; SOFT TISSUE ONCE AS NEEDED
Status: DISCONTINUED | OUTPATIENT
Start: 2019-07-12 | End: 2019-07-12 | Stop reason: SDUPTHER

## 2019-07-12 RX ORDER — LABETALOL HYDROCHLORIDE 5 MG/ML
5 INJECTION, SOLUTION INTRAVENOUS
Status: DISCONTINUED | OUTPATIENT
Start: 2019-07-12 | End: 2019-07-12 | Stop reason: HOSPADM

## 2019-07-12 RX ORDER — WARFARIN SODIUM 5 MG/1
5 TABLET ORAL
Status: DISCONTINUED | OUTPATIENT
Start: 2019-07-13 | End: 2019-07-13 | Stop reason: HOSPADM

## 2019-07-12 RX ORDER — NALOXONE HCL 0.4 MG/ML
0.4 VIAL (ML) INJECTION AS NEEDED
Status: DISCONTINUED | OUTPATIENT
Start: 2019-07-12 | End: 2019-07-12 | Stop reason: HOSPADM

## 2019-07-12 RX ORDER — DEXTROSE MONOHYDRATE 25 G/50ML
25 INJECTION, SOLUTION INTRAVENOUS
Status: DISCONTINUED | OUTPATIENT
Start: 2019-07-12 | End: 2019-07-13 | Stop reason: HOSPADM

## 2019-07-12 RX ORDER — KETAMINE HYDROCHLORIDE 100 MG/ML
INJECTION INTRAMUSCULAR; INTRAVENOUS AS NEEDED
Status: DISCONTINUED | OUTPATIENT
Start: 2019-07-12 | End: 2019-07-12 | Stop reason: SURG

## 2019-07-12 RX ORDER — EPHEDRINE SULFATE 50 MG/ML
5 INJECTION, SOLUTION INTRAVENOUS ONCE AS NEEDED
Status: DISCONTINUED | OUTPATIENT
Start: 2019-07-12 | End: 2019-07-12 | Stop reason: HOSPADM

## 2019-07-12 RX ORDER — SCOLOPAMINE TRANSDERMAL SYSTEM 1 MG/1
1 PATCH, EXTENDED RELEASE TRANSDERMAL
Status: DISCONTINUED | OUTPATIENT
Start: 2019-07-12 | End: 2019-07-13 | Stop reason: HOSPADM

## 2019-07-12 RX ORDER — SERTRALINE HYDROCHLORIDE 100 MG/1
100 TABLET, FILM COATED ORAL DAILY
COMMUNITY

## 2019-07-12 RX ORDER — HYDROMORPHONE HCL 110MG/55ML
0.5 PATIENT CONTROLLED ANALGESIA SYRINGE INTRAVENOUS
Status: DISCONTINUED | OUTPATIENT
Start: 2019-07-12 | End: 2019-07-13 | Stop reason: HOSPADM

## 2019-07-12 RX ORDER — ROCURONIUM BROMIDE 10 MG/ML
INJECTION, SOLUTION INTRAVENOUS AS NEEDED
Status: DISCONTINUED | OUTPATIENT
Start: 2019-07-12 | End: 2019-07-12 | Stop reason: SURG

## 2019-07-12 RX ORDER — PROPOFOL 10 MG/ML
VIAL (ML) INTRAVENOUS AS NEEDED
Status: DISCONTINUED | OUTPATIENT
Start: 2019-07-12 | End: 2019-07-12 | Stop reason: SURG

## 2019-07-12 RX ORDER — ASPIRIN 81 MG/1
81 TABLET ORAL NIGHTLY
Status: DISCONTINUED | OUTPATIENT
Start: 2019-07-12 | End: 2019-07-13 | Stop reason: HOSPADM

## 2019-07-12 RX ORDER — PROMETHAZINE HYDROCHLORIDE 25 MG/1
25 TABLET ORAL ONCE AS NEEDED
Status: DISCONTINUED | OUTPATIENT
Start: 2019-07-12 | End: 2019-07-12 | Stop reason: HOSPADM

## 2019-07-12 RX ORDER — OXYCODONE HYDROCHLORIDE AND ACETAMINOPHEN 5; 325 MG/1; MG/1
1 TABLET ORAL EVERY 4 HOURS PRN
Status: DISCONTINUED | OUTPATIENT
Start: 2019-07-12 | End: 2019-07-13 | Stop reason: HOSPADM

## 2019-07-12 RX ORDER — PROMETHAZINE HYDROCHLORIDE 25 MG/ML
12.5 INJECTION, SOLUTION INTRAMUSCULAR; INTRAVENOUS ONCE AS NEEDED
Status: DISCONTINUED | OUTPATIENT
Start: 2019-07-12 | End: 2019-07-12 | Stop reason: HOSPADM

## 2019-07-12 RX ORDER — FLUMAZENIL 0.1 MG/ML
0.2 INJECTION INTRAVENOUS AS NEEDED
Status: DISCONTINUED | OUTPATIENT
Start: 2019-07-12 | End: 2019-07-12 | Stop reason: HOSPADM

## 2019-07-12 RX ORDER — BUPIVACAINE HCL/0.9 % NACL/PF 0.1 %
2 PLASTIC BAG, INJECTION (ML) EPIDURAL ONCE
Status: COMPLETED | OUTPATIENT
Start: 2019-07-12 | End: 2019-07-12

## 2019-07-12 RX ORDER — NICOTINE POLACRILEX 4 MG
15 LOZENGE BUCCAL
Status: DISCONTINUED | OUTPATIENT
Start: 2019-07-12 | End: 2019-07-13 | Stop reason: HOSPADM

## 2019-07-12 RX ORDER — MIDAZOLAM HYDROCHLORIDE 1 MG/ML
INJECTION INTRAMUSCULAR; INTRAVENOUS AS NEEDED
Status: DISCONTINUED | OUTPATIENT
Start: 2019-07-12 | End: 2019-07-12 | Stop reason: SURG

## 2019-07-12 RX ADMIN — LINAGLIPTIN 5 MG: 5 TABLET, FILM COATED ORAL at 14:36

## 2019-07-12 RX ADMIN — KETAMINE HYDROCHLORIDE 20 MG: 100 INJECTION INTRAMUSCULAR; INTRAVENOUS at 07:53

## 2019-07-12 RX ADMIN — MIDAZOLAM HYDROCHLORIDE 1 MG: 2 INJECTION, SOLUTION INTRAMUSCULAR; INTRAVENOUS at 07:45

## 2019-07-12 RX ADMIN — MIDAZOLAM HYDROCHLORIDE 1 MG: 2 INJECTION, SOLUTION INTRAMUSCULAR; INTRAVENOUS at 07:42

## 2019-07-12 RX ADMIN — SODIUM CHLORIDE 100 ML/HR: 900 INJECTION, SOLUTION INTRAVENOUS at 13:20

## 2019-07-12 RX ADMIN — SCOPALAMINE 1 PATCH: 1 PATCH, EXTENDED RELEASE TRANSDERMAL at 09:00

## 2019-07-12 RX ADMIN — ROCURONIUM BROMIDE 2.5 MG: 10 INJECTION INTRAVENOUS at 08:20

## 2019-07-12 RX ADMIN — INSULIN DETEMIR 30 UNITS: 100 INJECTION, SOLUTION SUBCUTANEOUS at 22:00

## 2019-07-12 RX ADMIN — Medication 2 G: at 08:24

## 2019-07-12 RX ADMIN — HYDROMORPHONE HYDROCHLORIDE 1 MG: 2 INJECTION INTRAMUSCULAR; INTRAVENOUS; SUBCUTANEOUS at 07:44

## 2019-07-12 RX ADMIN — KETAMINE HYDROCHLORIDE 20 MG: 100 INJECTION INTRAMUSCULAR; INTRAVENOUS at 08:21

## 2019-07-12 RX ADMIN — Medication 2 MG: at 09:08

## 2019-07-12 RX ADMIN — ATORVASTATIN CALCIUM 80 MG: 40 TABLET, FILM COATED ORAL at 22:00

## 2019-07-12 RX ADMIN — PROPOFOL 20 MG: 10 INJECTION, EMULSION INTRAVENOUS at 07:55

## 2019-07-12 RX ADMIN — SUCCINYLCHOLINE CHLORIDE 160 MG: 20 INJECTION, SOLUTION INTRAMUSCULAR; INTRAVENOUS at 08:21

## 2019-07-12 RX ADMIN — CEFOXITIN SODIUM 2 G: 2 POWDER, FOR SOLUTION INTRAVENOUS at 13:56

## 2019-07-12 RX ADMIN — INSULIN ASPART 2 UNITS: 100 INJECTION, SOLUTION INTRAVENOUS; SUBCUTANEOUS at 22:00

## 2019-07-12 RX ADMIN — GLIPIZIDE 5 MG: 5 TABLET ORAL at 14:36

## 2019-07-12 RX ADMIN — OXYCODONE HYDROCHLORIDE AND ACETAMINOPHEN 1 TABLET: 5; 325 TABLET ORAL at 15:11

## 2019-07-12 RX ADMIN — SERTRALINE HYDROCHLORIDE 100 MG: 50 TABLET ORAL at 14:36

## 2019-07-12 RX ADMIN — ALBUTEROL SULFATE 5 PUFF: 90 AEROSOL, METERED RESPIRATORY (INHALATION) at 08:32

## 2019-07-12 RX ADMIN — PROPOFOL 20 MG: 10 INJECTION, EMULSION INTRAVENOUS at 07:48

## 2019-07-12 RX ADMIN — DEXAMETHASONE SODIUM PHOSPHATE 4 MG: 4 INJECTION, SOLUTION INTRAMUSCULAR; INTRAVENOUS at 08:32

## 2019-07-12 RX ADMIN — ROCURONIUM BROMIDE 20 MG: 10 INJECTION INTRAVENOUS at 08:28

## 2019-07-12 RX ADMIN — SODIUM CHLORIDE, SODIUM GLUCONATE, SODIUM ACETATE, POTASSIUM CHLORIDE, AND MAGNESIUM CHLORIDE: 526; 502; 368; 37; 30 INJECTION, SOLUTION INTRAVENOUS at 08:39

## 2019-07-12 RX ADMIN — HALOPERIDOL LACTATE 1 MG: 5 INJECTION, SOLUTION INTRAMUSCULAR at 08:58

## 2019-07-12 RX ADMIN — FERROUS SULFATE TAB EC 324 MG (65 MG FE EQUIVALENT) 324 MG: 324 (65 FE) TABLET DELAYED RESPONSE at 14:37

## 2019-07-12 RX ADMIN — OXYCODONE HYDROCHLORIDE AND ACETAMINOPHEN 1 TABLET: 5; 325 TABLET ORAL at 19:49

## 2019-07-12 RX ADMIN — SODIUM CHLORIDE 100 ML/HR: 900 INJECTION, SOLUTION INTRAVENOUS at 06:34

## 2019-07-12 RX ADMIN — INSULIN ASPART 4 UNITS: 100 INJECTION, SOLUTION INTRAVENOUS; SUBCUTANEOUS at 17:37

## 2019-07-12 RX ADMIN — ASPIRIN 81 MG: 81 TABLET, COATED ORAL at 22:00

## 2019-07-12 RX ADMIN — GLYCOPYRROLATE 0.4 MG: 0.2 INJECTION, SOLUTION INTRAMUSCULAR; INTRAVITREAL at 09:08

## 2019-07-12 RX ADMIN — METOPROLOL TARTRATE 12.5 MG: 25 TABLET, FILM COATED ORAL at 14:36

## 2019-07-12 RX ADMIN — CEFOXITIN SODIUM 2 G: 2 POWDER, FOR SOLUTION INTRAVENOUS at 21:59

## 2019-07-12 NOTE — OP NOTE
"Operative Note    Frances Motta  7/12/2019    Pre-op Diagnosis:   Ventral hernia without obstruction or gangrene [K43.9]    Post-op Diagnosis:     Post-Op Diagnosis Codes:     * Ventral hernia without obstruction or gangrene [K43.9]    Procedure/CPT® Codes:      Procedure(s):  Open Ventral/incisional Hernia Repair with mesh    Surgeon(s):  Tuan Hicks MD    Anesthesia: General    Staff:   Circulator: Lorena Pena RN; Va Herrera RN  Scrub Person: Rolando Shahid  Assistant: Kaelyn Murray CSA    Estimated Blood Loss: 20cc    Specimens:                None      Drains:   Closed/Suction Drain 1 Left Abdomen Bulb 19 Fr. (Active)   Dressing Status Clean;Dry;Intact 7/12/2019  8:59 AM       Findings: fat containing incisional hernia    Complications: none    Implant: 4.5\" Ventralex mesh    Indication: Symptomatic Incisional Hernia    Operative Note:    The patient was seen and consented in the preoperative area.  Following this she is brought to the operating room and placed in supine position on the OR table.   following placement of an arterial line by the anesthesia service general anesthetic was administered and the patient was orotracheally intubated without incident.  A preoperative briefing was performed.  The abdomen was prepped and draped in normal sterile fashion.  A timeout was then performed.    Following timeout the hernia above the umbilicus was identified.  A large amount of fat protruding through the hernia was reduced.  Skin incision was then made directly overlying the hernia defect.  Electrocautery was used to dissect through the subcutaneous tissues down to the hernia sac.  The hernia sac was relatively large and appeared to contain omental fat.  It was dissected free from the surrounding subcutaneous tissue using electrocautery.  The hernia sac was then trimmed away from the fascial defect which appeared to be approximately 3\" in diameter.  The hernia sac was " discarded.    Fatty tissue was elevated up and away from the muscular fascia circumferentially around the wound.  A 4.5 inch ventralex mesh was placed below the level of the fascia intraperitoneally.  It was secured circumferentially trans-fascially using 2-0 PDS sutures.  The primary fascial defect was then closed over the mesh using interrupted figure-of-eight #1 PDS suture.  The wound was irrigated.  Marcaine was infiltrated into the muscle.  A 19 Syrian round drain was placed into the subcutaneous defect and secured to the skin using a nylon suture.  The skin defect was then closed in layers using 3-0 and 4-0 Vicryl suture.  Mastisol, Steri-Strips and a pressure dressing were then applied.  The patient was then awakened and returned recovery in good condition.          This document has been electronically signed by Tuan Hicks MD on July 12, 2019 9:32 AM        Tuan Hicks MD     Date: 7/12/2019  Time: 9:25 AM

## 2019-07-12 NOTE — H&P
CHIEF COMPLAINT:     Symptomatic ventral hernia     HISTORY OF PRESENT ILLNESS:     Frances Motta is a 39 y.o. female who has a known upper abdominal hernia for many years.  It has gradually gotten larger. She notes dull pain in the area which increases with activity.  She had previously seen Dr. Price about 5 years ago to discuss repair but decided to wait. The hernia has enlarged since that time. She is here today for repair.     She has a complex cardiac history and is on Coumadin following valve replacement.  She has been off of Coumadin and on Lovenox previously for other procedures.  Her INR today is 1.0, having been off of Coumadin preop. Labs done by her cardiologist were essentially normal.     Medical History        Past Medical History:   Diagnosis Date   • AICD (automatic cardioverter/defibrillator) present     • Anticoagulation goal of INR 2.5 to 3.5     • Asthma     • Cardiomyopathy (CMS/HCC)     • Congestive heart failure (CMS/HCC)     • Diabetes mellitus (CMS/HCC)     • Disease of thyroid gland       hypothyroidism    • Enlarged heart     • Hx of mitral valve replacement with mechanical valve       with thrombus 7/2017   • Migraine     • TIA (transient ischemic attack)              Surgical History         Past Surgical History:   Procedure Laterality Date   • A-V CARDIAC PACEMAKER INSERTION       • APPENDECTOMY       • ATRIAL CARDIAC PACEMAKER INSERTION       • CARDIAC CATHETERIZATION       • CARDIAC SURGERY       • CARDIAC VALVE SURGERY       • CYSTOSCOPY   09/23/2015   • HYSTERECTOMY       • TONSILLECTOMY       • TRANSESOPHAGEAL ECHOCARDIOGRAM (CABRERA)                Prior to Admission medications    Medication Sig Start Date End Date Taking? Authorizing Provider   atorvastatin (LIPITOR) 80 MG tablet Take 80 mg by mouth Daily.     Yes Provider, MD Caleb   docusate sodium (COLACE) 100 MG capsule Take 1 capsule by mouth 2 (Two) Times a Day As Needed for Constipation. 6/14/18   Yes Rebekah  Remington ORTIZ MD   dronedarone (MULTAQ) 400 MG tablet Take 1 tablet by mouth 2 (Two) Times a Day With Meals. 8/30/17   Yes Wallace Haro MD   fenofibrate 160 MG tablet Take 1 tablet by mouth Daily. 5/19/17   Yes Casandra Pedersen DO   ferrous sulfate 325 (65 FE) MG tablet Take 1 tablet by mouth Daily With Breakfast. 10/23/18   Yes Remington Welch MD   folic acid (FOLVITE) 1 MG tablet Take 1 tablet by mouth Daily. 12/13/18   Yes Remington Welch MD   Insulin Glargine (LANTUS SOLOSTAR) 100 UNIT/ML injection pen Inject 20 Units under the skin Every Night.  Patient taking differently: Inject 30 Units under the skin Every Night. 11/13/17   Yes Casandra Pedersen DO   levothyroxine (SYNTHROID, LEVOTHROID) 50 MCG tablet Take 1 tablet by mouth Daily. 6/1/17   Yes Casandra Pedersen DO   magnesium oxide (MAGOX) 400 (241.3 Mg) MG tablet tablet Take 400 mg by mouth Daily.     Yes Provider, MD Caleb   metoprolol tartrate (LOPRESSOR) 25 MG tablet Take 0.5 tablets by mouth 2 (Two) Times a Day. 8/30/17   Yes Wallace Haro MD   pantoprazole (PROTONIX) 40 MG EC tablet Take 1 tablet by mouth Daily. 5/19/17   Yes Casandra Pedersen DO   vitamin B-12 (CYANOCOBALAMIN) 1000 MCG tablet Take 1 tablet by mouth Daily. 12/13/18   Yes Remington Welch MD   warfarin (COUMADIN) 5 MG tablet Take 1 tablet by mouth Dose Adjusted By Provider As Needed. 2 po Monday through Thursday Friday sat sun 3 tabs 5/19/17   Yes Casandra Pedersen DO              Allergies   Allergen Reactions   • Acetaminophen GI Intolerance   • Hydrocodone GI Intolerance   • Hydrocodone-Acetaminophen Nausea And Vomiting and Nausea Only   • Lortab [Hydrocodone-Acetaminophen]                 Family History   Problem Relation Age of Onset   • Diabetes Father     • Hypertension Father     • Diabetes Maternal Grandmother     • Kidney disease Maternal Grandmother     • Hypertension Maternal Grandmother     • Heart disease Maternal Grandmother     • Stroke Maternal Grandmother    "  • Thyroid disease Maternal Grandmother     • Cancer Maternal Grandfather     • Kidney disease Paternal Grandmother     • Diabetes Paternal Grandfather           Social History   Social History            Socioeconomic History   • Marital status:        Spouse name: Not on file   • Number of children: Not on file   • Years of education: Not on file   • Highest education level: Not on file   Tobacco Use   • Smoking status: Former Smoker   • Smokeless tobacco: Never Used   Substance and Sexual Activity   • Alcohol use: No   • Drug use: No   • Sexual activity: Defer            Review of Systems   Constitutional: Negative for appetite change, chills, fever and unexpected weight change.   HENT: Negative for hearing loss, nosebleeds and trouble swallowing.    Eyes: Negative for visual disturbance.   Respiratory: Negative for apnea, cough, choking, chest tightness, shortness of breath, wheezing and stridor.    Cardiovascular: Negative for chest pain, palpitations and leg swelling.   Gastrointestinal: Positive for abdominal pain. Negative for abdominal distention, blood in stool, constipation, diarrhea, nausea and vomiting.        Heartburn   Endocrine: Positive for heat intolerance. Negative for cold intolerance, polydipsia, polyphagia and polyuria.   Genitourinary: Positive for frequency. Negative for difficulty urinating, dysuria, hematuria and urgency.   Musculoskeletal: Negative for arthralgias, back pain, myalgias and neck pain.   Skin: Negative for color change, pallor and rash.   Allergic/Immunologic: Negative for immunocompromised state.   Neurological: Positive for headaches. Negative for dizziness, seizures, syncope, light-headedness and numbness.   Hematological: Negative for adenopathy.   Psychiatric/Behavioral: Negative for suicidal ideas. The patient is not nervous/anxious.             Objective         /80   Pulse 85   Temp 98.3 °F (36.8 °C) (Oral)   Ht 167.6 cm (66\")   Wt 108 kg (239 lb)  "  LMP 04/30/2015 (Within Months) Comment: Partial Hysterectomy  BMI 38.58 kg/m²      Physical Exam   Constitutional: She is oriented to person, place, and time. She appears well-developed and well-nourished. No distress.   HENT:   Head: Normocephalic and atraumatic.   Eyes: Conjunctivae and EOM are normal. Pupils are equal, round, and reactive to light. Right eye exhibits no discharge. Left eye exhibits no discharge.   Neck: Normal range of motion. Neck supple. No JVD present. No tracheal deviation present. No thyromegaly present.   Cardiovascular: Normal rate, regular rhythm and normal heart sounds. Exam reveals no gallop and no friction rub.   No murmur heard.  Pulmonary/Chest: Effort normal and breath sounds normal. No respiratory distress. She has no wheezes. She has no rales. She exhibits no tenderness.   Abdominal: Soft. She exhibits no distension and no mass. There is no tenderness. There is no rebound and no guarding. No hernia.       Musculoskeletal: Normal range of motion. She exhibits no edema, tenderness or deformity.   Neurological: She is alert and oriented to person, place, and time. No cranial nerve deficit.   Skin: Skin is warm and dry. No rash noted. She is not diaphoretic. No erythema. No pallor.   Psychiatric: She has a normal mood and affect. Her behavior is normal. Judgment and thought content normal.         DIAGNOSTIC DATA:     Prior CT of the abdomen from 2016 reviewed showing fat containing ventral hernia     ASSESSMENT:     Ventral hernia, symptomatic     PLAN:     She has a hernia which she would like to have repaired.  I have discussed with her the risks and benefits of both laparoscopic and open repairs.  Will plan for an open repair with mesh. The risks and benefits of Open Ventral Hernia Repair with mesh were discussed.

## 2019-07-12 NOTE — ANESTHESIA PREPROCEDURE EVALUATION
Anesthesia Evaluation     Patient summary reviewed   history of anesthetic complications: PONV  NPO Solid Status: > 8 hours  NPO Liquid Status: > 8 hours           Airway   Mallampati: II  TM distance: >3 FB  Neck ROM: full  Large neck circumference  Dental    (+) poor dentition    Pulmonary - normal exam   (+) asthma, sleep apnea,   Cardiovascular - normal exam  Exercise tolerance: good (4-7 METS)    NYHA Classification: II  ECG reviewed  PT is on anticoagulation therapy  Patient on routine beta blocker    (+) hypertension, dysrhythmias Atrial Fib, CHF, hyperlipidemia,     ROS comment: S/p mitral valve replacement [facility] in the past for IHSS; off coumadin, now on sq lovenox.    Neuro/Psych  (+) TIA, headaches, psychiatric history Anxiety,     GI/Hepatic/Renal/Endo    (+) obesity, morbid obesity, GERD,  diabetes mellitus, hypothyroidism,     Musculoskeletal     (+) neck pain,   Abdominal    Substance History      OB/GYN          Other   (+) arthritis                     Anesthesia Plan    ASA 3     general   (Discussed the GA along with the arterial line with the patient.)  intravenous induction   Anesthetic plan, all risks, benefits, and alternatives have been provided, discussed and informed consent has been obtained with: patient.

## 2019-07-12 NOTE — ANESTHESIA PROCEDURE NOTES
Arterial Line      Patient location during procedure: OR   Line placed for hemodynamic monitoring.  Performed By   Anesthesiologist: Wallace Oneill MD  CRNA: Jose J Peoples CRNA  Preanesthetic Checklist  Completed: patient identified, site marked, surgical consent, pre-op evaluation, timeout performed, IV checked, risks and benefits discussed and monitors and equipment checked  Arterial Line Prep   Sterile Tech: cap, gloves and mask  Prep: ChloraPrep  Patient monitoring: blood pressure monitoring, continuous pulse oximetry and EKG  Arterial Line Procedure   Laterality:right  Location:  radial artery  Catheter size: 22 G   Guidance: landmark technique  Number of attempts: multiple attempts.  Successful placement: yes  Post Assessment   Dressing Type: occlusive dressing applied and secured with tape.   Complications no  Circ/Move/Sens Assessment: normal.

## 2019-07-12 NOTE — PLAN OF CARE
Problem: Patient Care Overview  Goal: Plan of Care Review  Outcome: Ongoing (interventions implemented as appropriate)      Problem: Fall Risk (Adult)  Goal: Identify Related Risk Factors and Signs and Symptoms  Outcome: Outcome(s) achieved Date Met: 07/12/19

## 2019-07-12 NOTE — ANESTHESIA PROCEDURE NOTES
Airway  Urgency: elective    Date/Time: 7/12/2019 8:21 AM  End Time:7/12/2019 8:21 AM    General Information and Staff    Patient location during procedure: OR    Indications and Patient Condition  Indications for airway management: airway protection    Preoxygenated: yes  Mask difficulty assessment: 0 - not attempted    Final Airway Details  Final airway type: endotracheal airway      Successful airway: ETT  Cuffed: yes   Successful intubation technique: video laryngoscopy  Facilitating devices/methods: intubating stylet  Endotracheal tube insertion site: oral  Blade: Doshi  Blade size: 3  ETT size (mm): 7.5  Cormack-Lehane Classification: grade I - full view of glottis  Placement verified by: chest auscultation and capnometry   Measured from: lips  ETT to lips (cm): 21  Number of attempts at approach: 1

## 2019-07-12 NOTE — ANESTHESIA POSTPROCEDURE EVALUATION
Patient: Frances Motta    Procedure Summary     Date:  07/12/19 Room / Location:  [facility] OR 03 / [facility] OR    Anesthesia Start:  0744 Anesthesia Stop:  0931    Procedure:  Open Ventral Hernia Repair with mesh (N/A Abdomen) Diagnosis:       Ventral hernia without obstruction or gangrene      (Ventral hernia without obstruction or gangrene [K43.9])    Surgeon:  Tuan Hicks MD Provider:  Wallace Oneill MD    Anesthesia Type:  general ASA Status:  3          Anesthesia Type: general  Last vitals  BP   116/66 (07/12/19 0628)   Temp   97.5 °F (36.4 °C) (07/12/19 0628)   Pulse   63 (07/12/19 0628)   Resp   18 (07/12/19 0628)     SpO2   98 % (07/12/19 0628)     Post Anesthesia Care and Evaluation    Patient location during evaluation: PACU  Patient participation: complete - patient participated  Level of consciousness: sleepy but conscious  Pain score: 0  Pain management: adequate  Airway patency: patent  Anesthetic complications: No anesthetic complications  PONV Status: none  Cardiovascular status: acceptable  Respiratory status: acceptable  Hydration status: acceptable

## 2019-07-13 VITALS
BODY MASS INDEX: 38.76 KG/M2 | WEIGHT: 241.2 LBS | TEMPERATURE: 98.4 F | DIASTOLIC BLOOD PRESSURE: 67 MMHG | HEIGHT: 66 IN | RESPIRATION RATE: 18 BRPM | SYSTOLIC BLOOD PRESSURE: 118 MMHG | HEART RATE: 66 BPM | OXYGEN SATURATION: 94 %

## 2019-07-13 LAB
ANION GAP SERPL CALCULATED.3IONS-SCNC: 10 MMOL/L (ref 5–15)
BASOPHILS # BLD AUTO: 0.03 10*3/MM3 (ref 0–0.2)
BASOPHILS NFR BLD AUTO: 0.3 % (ref 0–1.5)
BUN BLD-MCNC: 9 MG/DL (ref 6–20)
BUN/CREAT SERPL: 9.6 (ref 7–25)
CALCIUM SPEC-SCNC: 8.5 MG/DL (ref 8.6–10.5)
CHLORIDE SERPL-SCNC: 106 MMOL/L (ref 98–107)
CO2 SERPL-SCNC: 24 MMOL/L (ref 22–29)
CREAT BLD-MCNC: 0.94 MG/DL (ref 0.57–1)
DEPRECATED RDW RBC AUTO: 42.8 FL (ref 37–54)
EOSINOPHIL # BLD AUTO: 0.13 10*3/MM3 (ref 0–0.4)
EOSINOPHIL NFR BLD AUTO: 1.3 % (ref 0.3–6.2)
ERYTHROCYTE [DISTWIDTH] IN BLOOD BY AUTOMATED COUNT: 13.9 % (ref 12.3–15.4)
GFR SERPL CREATININE-BSD FRML MDRD: 66 ML/MIN/1.73
GLUCOSE BLD-MCNC: 116 MG/DL (ref 65–99)
GLUCOSE BLDC GLUCOMTR-MCNC: 123 MG/DL (ref 70–130)
GLUCOSE BLDC GLUCOMTR-MCNC: 168 MG/DL (ref 70–130)
HCT VFR BLD AUTO: 33.8 % (ref 34–46.6)
HGB BLD-MCNC: 10.8 G/DL (ref 12–15.9)
IMM GRANULOCYTES # BLD AUTO: 0.05 10*3/MM3 (ref 0–0.05)
IMM GRANULOCYTES NFR BLD AUTO: 0.5 % (ref 0–0.5)
LYMPHOCYTES # BLD AUTO: 2.56 10*3/MM3 (ref 0.7–3.1)
LYMPHOCYTES NFR BLD AUTO: 25.7 % (ref 19.6–45.3)
MCH RBC QN AUTO: 27.3 PG (ref 26.6–33)
MCHC RBC AUTO-ENTMCNC: 32 G/DL (ref 31.5–35.7)
MCV RBC AUTO: 85.4 FL (ref 79–97)
MONOCYTES # BLD AUTO: 0.82 10*3/MM3 (ref 0.1–0.9)
MONOCYTES NFR BLD AUTO: 8.2 % (ref 5–12)
NEUTROPHILS # BLD AUTO: 6.39 10*3/MM3 (ref 1.7–7)
NEUTROPHILS NFR BLD AUTO: 64 % (ref 42.7–76)
NRBC BLD AUTO-RTO: 0 /100 WBC (ref 0–0.2)
PLATELET # BLD AUTO: 292 10*3/MM3 (ref 140–450)
PMV BLD AUTO: 9.1 FL (ref 6–12)
POTASSIUM BLD-SCNC: 3.4 MMOL/L (ref 3.5–5.2)
RBC # BLD AUTO: 3.96 10*6/MM3 (ref 3.77–5.28)
SODIUM BLD-SCNC: 140 MMOL/L (ref 136–145)
WBC NRBC COR # BLD: 9.98 10*3/MM3 (ref 3.4–10.8)

## 2019-07-13 PROCEDURE — 80048 BASIC METABOLIC PNL TOTAL CA: CPT | Performed by: SURGERY

## 2019-07-13 PROCEDURE — 99024 POSTOP FOLLOW-UP VISIT: CPT | Performed by: SURGERY

## 2019-07-13 PROCEDURE — 25010000002 ENOXAPARIN PER 10 MG: Performed by: SURGERY

## 2019-07-13 PROCEDURE — 85025 COMPLETE CBC W/AUTO DIFF WBC: CPT | Performed by: SURGERY

## 2019-07-13 PROCEDURE — 63710000001 INSULIN ASPART PER 5 UNITS: Performed by: SURGERY

## 2019-07-13 PROCEDURE — 25010000002 CEFOXITIN PER 1 G: Performed by: SURGERY

## 2019-07-13 PROCEDURE — 82962 GLUCOSE BLOOD TEST: CPT

## 2019-07-13 RX ORDER — OXYCODONE HYDROCHLORIDE AND ACETAMINOPHEN 5; 325 MG/1; MG/1
1 TABLET ORAL EVERY 4 HOURS PRN
Qty: 30 TABLET | Refills: 0 | Status: SHIPPED | OUTPATIENT
Start: 2019-07-13

## 2019-07-13 RX ADMIN — PANTOPRAZOLE SODIUM 40 MG: 40 TABLET, DELAYED RELEASE ORAL at 08:08

## 2019-07-13 RX ADMIN — LEVOTHYROXINE SODIUM 50 MCG: 50 TABLET ORAL at 08:08

## 2019-07-13 RX ADMIN — OXYCODONE HYDROCHLORIDE AND ACETAMINOPHEN 1 TABLET: 5; 325 TABLET ORAL at 04:48

## 2019-07-13 RX ADMIN — INSULIN ASPART 2 UNITS: 100 INJECTION, SOLUTION INTRAVENOUS; SUBCUTANEOUS at 12:01

## 2019-07-13 RX ADMIN — OXYCODONE HYDROCHLORIDE AND ACETAMINOPHEN 1 TABLET: 5; 325 TABLET ORAL at 11:41

## 2019-07-13 RX ADMIN — METOPROLOL TARTRATE 12.5 MG: 25 TABLET, FILM COATED ORAL at 08:08

## 2019-07-13 RX ADMIN — FERROUS SULFATE TAB EC 324 MG (65 MG FE EQUIVALENT) 324 MG: 324 (65 FE) TABLET DELAYED RESPONSE at 08:08

## 2019-07-13 RX ADMIN — LINAGLIPTIN 5 MG: 5 TABLET, FILM COATED ORAL at 08:08

## 2019-07-13 RX ADMIN — SERTRALINE HYDROCHLORIDE 100 MG: 50 TABLET ORAL at 08:08

## 2019-07-13 RX ADMIN — ENOXAPARIN SODIUM 110 MG: 120 INJECTION SUBCUTANEOUS at 08:08

## 2019-07-13 RX ADMIN — GLIPIZIDE 5 MG: 5 TABLET ORAL at 08:08

## 2019-07-13 RX ADMIN — CEFOXITIN SODIUM 2 G: 2 POWDER, FOR SOLUTION INTRAVENOUS at 02:20

## 2019-07-13 NOTE — DISCHARGE SUMMARY
Discharge Summary  Date: 07/13/19  Service: General Surgery  Attending: Tuan Hicks    Procedures: Open ventral hernia repair with mesh  Consults: none  Discharge Diagnoses: Incisional hernia  Hospital Course: Admitted following surgery for pain control. She did well overnight and was tolerating regular diet.  Her pain was controlled with PO meds, therefore she was deemed stable for discharge.  She will continue her home Lovenox and restart Coumadin today.  Will plan to see this coming week to remove drain.    Condition at time of Discharge: stable  Discharge Diet: Regular      Discharge Medications:     Your medication list      START taking these medications      Instructions Last Dose Given Next Dose Due   oxyCODONE-acetaminophen 5-325 MG per tablet  Commonly known as:  PERCOCET      Take 1 tablet by mouth Every 4 (Four) Hours As Needed (Pain).          CONTINUE taking these medications      Instructions Last Dose Given Next Dose Due   aspirin 81 MG EC tablet      Take 81 mg by mouth Every Night.       atorvastatin 80 MG tablet  Commonly known as:  LIPITOR      Take 80 mg by mouth Every Night.       docusate sodium 100 MG capsule  Commonly known as:  COLACE      Take 100 mg by mouth 2 (Two) Times a Day.       fenofibrate 160 MG tablet      Take 160 mg by mouth Every Night.       ferrous sulfate 325 (65 FE) MG tablet      Take 1 tablet by mouth Daily With Breakfast.       glyBURIDE 5 MG tablet  Commonly known as:  DIAbeta      Take 5 mg by mouth 2 (Two) Times a Day With Meals.       insulin glargine 100 UNIT/ML injection  Commonly known as:  LANTUS      Inject 30 Units under the skin into the appropriate area as directed Every Night.       levothyroxine 50 MCG tablet  Commonly known as:  SYNTHROID, LEVOTHROID      Take 1 tablet by mouth Daily.       LOVENOX SC      Inject  under the skin into the appropriate area as directed. As directed prior to surgery       metoprolol tartrate 25 MG tablet  Commonly known  as:  LOPRESSOR      Take 0.5 tablets by mouth 2 (Two) Times a Day.       pantoprazole 40 MG EC tablet  Commonly known as:  PROTONIX      Take 1 tablet by mouth Daily.       sertraline 100 MG tablet  Commonly known as:  ZOLOFT      Take 100 mg by mouth Daily.       SITagliptin 100 MG tablet  Commonly known as:  JANUVIA      Take 100 mg by mouth Daily.       warfarin 5 MG tablet  Commonly known as:  COUMADIN      Take 5 mg by mouth Every Night. As directed             Where to Get Your Medications      You can get these medications from any pharmacy    Bring a paper prescription for each of these medications  · oxyCODONE-acetaminophen 5-325 MG per tablet         Activity Instructions     Discharge Activity      1) No driving while taking narcotics.   2) May shower, no tub bath or submerging incisions  3) Do not lift / push / pull more than 15 lbs.            Your Scheduled Appointments    Aug 07, 2019 10:00 AM CDT  LAB with NURSE JOSE SAXENA  [facility] ([address] [address] [phone]   Aug 07, 2019 10:30 AM CDT  FOLLOW UP with Remington Welch MD  [facility] [address] [address] [phone]                  This document has been electronically signed by Tuan Hicks MD on July 13, 2019 10:18 AM

## 2019-07-13 NOTE — PROGRESS NOTES
GENERAL SURGERY PROGRESS NOTE  Chief Complaint:  Surgery Follow up   LOS: 1 day       Subjective     Interval History:     Feels well, tolerating diet. Pain controlled with oral medications.    Objective     Vital Signs  Temp:  [97 °F (36.1 °C)-99.2 °F (37.3 °C)] 98.4 °F (36.9 °C)  Heart Rate:  [68-80] 70  Resp:  [16-20] 18  BP: (100-131)/(55-79) 114/79    Physical Exam:   Drain in place. Dressing CDI  Labs:  Lab Results (last 24 hours)     Procedure Component Value Units Date/Time    POC Glucose Once [[id number]]  (Normal) Collected:  07/13/19 0733    Specimen:  Blood Updated:  07/13/19 0755     Glucose 123 mg/dL      Comment: RN NotifiedOperator: [id number] KRISTA Kenny ID: [id number]       Basic Metabolic Panel [[id number]]  (Abnormal) Collected:  07/13/19 0507    Specimen:  Blood Updated:  07/13/19 0551     Glucose 116 mg/dL      BUN 9 mg/dL      Creatinine 0.94 mg/dL      Sodium 140 mmol/L      Potassium 3.4 mmol/L      Chloride 106 mmol/L      CO2 24.0 mmol/L      Calcium 8.5 mg/dL      eGFR Non African Amer 66 mL/min/1.73      BUN/Creatinine Ratio 9.6     Anion Gap 10.0 mmol/L     Narrative:       GFR Normal >60  Chronic Kidney Disease <60  Kidney Failure <15    CBC & Differential [[id number]] Collected:  07/13/19 0507    Specimen:  Blood Updated:  07/13/19 0534    Narrative:       The following orders were created for panel order CBC & Differential.  Procedure                               Abnormality         Status                     ---------                               -----------         ------                     CBC Auto Differential[[id number]]        Abnormal            Final result                 Please view results for these tests on the individual orders.    CBC Auto Differential [[id number]]  (Abnormal) Collected:  07/13/19 0507    Specimen:  Blood Updated:  07/13/19 0534     WBC 9.98 10*3/mm3      RBC 3.96 10*6/mm3      Hemoglobin 10.8 g/dL      Hematocrit 33.8 %      MCV 85.4 fL       MCH 27.3 pg      MCHC 32.0 g/dL      RDW 13.9 %      RDW-SD 42.8 fl      MPV 9.1 fL      Platelets 292 10*3/mm3      Neutrophil % 64.0 %      Lymphocyte % 25.7 %      Monocyte % 8.2 %      Eosinophil % 1.3 %      Basophil % 0.3 %      Immature Grans % 0.5 %      Neutrophils, Absolute 6.39 10*3/mm3      Lymphocytes, Absolute 2.56 10*3/mm3      Monocytes, Absolute 0.82 10*3/mm3      Eosinophils, Absolute 0.13 10*3/mm3      Basophils, Absolute 0.03 10*3/mm3      Immature Grans, Absolute 0.05 10*3/mm3      nRBC 0.0 /100 WBC     POC Glucose Once [[id number]]  (Abnormal) Collected:  07/12/19 1955    Specimen:  Blood Updated:  07/12/19 2012     Glucose 180 mg/dL      Comment: RN NotifiedOperator: [id number] JAZ LINGSEYMeter ID: [id number]       POC Glucose Once [[id number]]  (Abnormal) Collected:  07/12/19 1621    Specimen:  Blood Updated:  07/12/19 1646     Glucose 231 mg/dL      Comment: RN NotifiedOperator: [id number] KRISTA ROMELCentinela Freeman Regional Medical Center, Memorial CampusMeter ID: [id number]              Results Review:     Labs and imaging for today were reviewed.    Assessment/Plan     Frances Motta is a 39 y.o. female who is s/p VHR      Home today on oral pain meds with drain in place.  Will continue lovenox and restart home Coumadin.          This document has been electronically signed by Tuan Hicks MD on July 13, 2019 9:49 AM        Tuan Hicks MD  07/13/19  9:49 AM

## 2019-07-13 NOTE — PLAN OF CARE
Problem: Patient Care Overview  Goal: Plan of Care Review  Outcome: Ongoing (interventions implemented as appropriate)   07/13/19 3079   Coping/Psychosocial   Plan of Care Reviewed With patient   Plan of Care Review   Progress no change   OTHER   Outcome Summary vitals stable, pain well controlled with oral pain meds, drain output is sanguinous, continuing to monitor.

## 2019-07-13 NOTE — DISCHARGE INSTR - LAB
Dorina East, APRN  1 Week  [phone]  Info was sent to the office. If they do not contact you within one business day with appointment info, please call.    Dr Hicks  1 Week  [phone]  Info was sent to the office. If they do not contact you within one business day with appointment info, please call.

## 2019-07-22 ENCOUNTER — OFFICE VISIT (OUTPATIENT)
Dept: SURGERY | Facility: CLINIC | Age: 39
End: 2019-07-22

## 2019-07-22 VITALS
TEMPERATURE: 98 F | HEART RATE: 84 BPM | WEIGHT: 233 LBS | SYSTOLIC BLOOD PRESSURE: 124 MMHG | HEIGHT: 66 IN | BODY MASS INDEX: 37.45 KG/M2 | DIASTOLIC BLOOD PRESSURE: 74 MMHG

## 2019-07-22 DIAGNOSIS — Z09 FOLLOW UP: Primary | ICD-10-CM

## 2019-07-22 PROCEDURE — 99024 POSTOP FOLLOW-UP VISIT: CPT | Performed by: SURGERY

## 2019-07-22 NOTE — PROGRESS NOTES
CHIEF COMPLAINT:    Chief Complaint   Patient presents with   • Post-op     P.O. Ventral/incisional hernia repair with mesh on 7/12/19.       HISTORY OF PRESENT ILLNESS:    Frances Motta is a 39 y.o. female who underwent open incisional hernia repair with mesh on 7/12/2019.  She returns today for follow-up.  She has a subcutaneous drain in place which has had minimal output at home.  She reports mild incisional pain.  She has been eating and drinking without issue and having normal bowel function.  She is taking no pain medications.    EXAM:  Vitals:    07/22/19 0912   BP: 124/74   Pulse: 84   Temp: 98 °F (36.7 °C)         Abdomen soft, incision healed appropriately.  Drain removed today.    ASSESSMENT:    Status post open incisional hernia repair    PLAN:    Overall she is doing well.  Continue no heavy lifting.  We will see her back in 2 weeks to recheck.          This document has been electronically signed by Tuan Hicks MD on July 22, 2019 9:45 AM

## 2019-08-05 ENCOUNTER — OFFICE VISIT (OUTPATIENT)
Dept: SURGERY | Facility: CLINIC | Age: 39
End: 2019-08-05

## 2019-08-05 VITALS
WEIGHT: 232.8 LBS | HEIGHT: 66 IN | BODY MASS INDEX: 37.41 KG/M2 | TEMPERATURE: 99.3 F | SYSTOLIC BLOOD PRESSURE: 102 MMHG | DIASTOLIC BLOOD PRESSURE: 72 MMHG | OXYGEN SATURATION: 98 % | HEART RATE: 94 BPM

## 2019-08-05 DIAGNOSIS — Z09 FOLLOW UP: Primary | ICD-10-CM

## 2019-08-05 PROCEDURE — 99024 POSTOP FOLLOW-UP VISIT: CPT | Performed by: SURGERY

## 2019-08-05 NOTE — PROGRESS NOTES
CHIEF COMPLAINT:    Chief Complaint   Patient presents with   • Post-op     s/p incisional hernia        HISTORY OF PRESENT ILLNESS:    Frances Motta is a 39 y.o. female who underwent open incisional hernia repair on 7/12/2019.  She returns today for additional follow-up.  Her drain was removed on 22 July.  Overall she states that she feels well.  She still has some abdominal soreness.  She also notes firmness in and around the area of her incision site.  She has refrained from heavy lifting.    EXAM:  Vitals:    08/05/19 0905   BP: 102/72   Pulse: 94   Temp: 99.3 °F (37.4 °C)   SpO2: 98%         Abdomen soft, incision healing appropriately, firmness of subcutaneous tissues and area of hernia repair    ASSESSMENT:    Status post open incisional hernia repair    PLAN:    Overall she appears to be healing appropriately.  Continue no heavy lifting.  Follow-up in 1 month for recheck.          This document has been electronically signed by Tuan Hicks MD on August 5, 2019 9:13 AM

## 2019-08-07 ENCOUNTER — LAB (OUTPATIENT)
Dept: ONCOLOGY | Facility: HOSPITAL | Age: 39
End: 2019-08-07

## 2019-08-07 ENCOUNTER — OFFICE VISIT (OUTPATIENT)
Dept: ONCOLOGY | Facility: CLINIC | Age: 39
End: 2019-08-07

## 2019-08-07 VITALS
OXYGEN SATURATION: 98 % | TEMPERATURE: 98.6 F | HEIGHT: 66 IN | DIASTOLIC BLOOD PRESSURE: 72 MMHG | BODY MASS INDEX: 37.35 KG/M2 | WEIGHT: 232.4 LBS | SYSTOLIC BLOOD PRESSURE: 118 MMHG | RESPIRATION RATE: 18 BRPM | HEART RATE: 83 BPM

## 2019-08-07 DIAGNOSIS — Z79.01 SUBTHERAPEUTIC ANTICOAGULATION: ICD-10-CM

## 2019-08-07 DIAGNOSIS — D64.9 ANEMIA, UNSPECIFIED TYPE: Primary | ICD-10-CM

## 2019-08-07 DIAGNOSIS — Z51.81 SUBTHERAPEUTIC ANTICOAGULATION: ICD-10-CM

## 2019-08-07 DIAGNOSIS — D64.9 ANEMIA, UNSPECIFIED TYPE: ICD-10-CM

## 2019-08-07 DIAGNOSIS — D75.839 THROMBOCYTOSIS: ICD-10-CM

## 2019-08-07 LAB
BASOPHILS # BLD AUTO: 0.04 10*3/MM3 (ref 0–0.2)
BASOPHILS NFR BLD AUTO: 0.5 % (ref 0–1.5)
DEPRECATED RDW RBC AUTO: 41.7 FL (ref 37–54)
EOSINOPHIL # BLD AUTO: 0.25 10*3/MM3 (ref 0–0.4)
EOSINOPHIL NFR BLD AUTO: 2.9 % (ref 0.3–6.2)
ERYTHROCYTE [DISTWIDTH] IN BLOOD BY AUTOMATED COUNT: 13.8 % (ref 12.3–15.4)
FERRITIN SERPL-MCNC: 158.2 NG/ML (ref 13–150)
FOLATE SERPL-MCNC: 10.1 NG/ML (ref 4.78–24.2)
HCT VFR BLD AUTO: 35 % (ref 34–46.6)
HGB BLD-MCNC: 11.1 G/DL (ref 12–15.9)
IMM GRANULOCYTES # BLD AUTO: 0.04 10*3/MM3 (ref 0–0.05)
IMM GRANULOCYTES NFR BLD AUTO: 0.5 % (ref 0–0.5)
IRON 24H UR-MRATE: 88 MCG/DL (ref 37–145)
IRON SATN MFR SERPL: 23 % (ref 20–50)
LYMPHOCYTES # BLD AUTO: 1.99 10*3/MM3 (ref 0.7–3.1)
LYMPHOCYTES NFR BLD AUTO: 22.9 % (ref 19.6–45.3)
MCH RBC QN AUTO: 26.4 PG (ref 26.6–33)
MCHC RBC AUTO-ENTMCNC: 31.7 G/DL (ref 31.5–35.7)
MCV RBC AUTO: 83.3 FL (ref 79–97)
MONOCYTES # BLD AUTO: 0.45 10*3/MM3 (ref 0.1–0.9)
MONOCYTES NFR BLD AUTO: 5.2 % (ref 5–12)
NEUTROPHILS # BLD AUTO: 5.91 10*3/MM3 (ref 1.7–7)
NEUTROPHILS NFR BLD AUTO: 68 % (ref 42.7–76)
NRBC BLD AUTO-RTO: 0 /100 WBC (ref 0–0.2)
PLATELET # BLD AUTO: 521 10*3/MM3 (ref 140–450)
PMV BLD AUTO: 8.6 FL (ref 6–12)
RBC # BLD AUTO: 4.2 10*6/MM3 (ref 3.77–5.28)
TIBC SERPL-MCNC: 375 MCG/DL (ref 298–536)
TRANSFERRIN SERPL-MCNC: 252 MG/DL (ref 200–360)
VIT B12 BLD-MCNC: 653 PG/ML (ref 211–946)
WBC NRBC COR # BLD: 8.68 10*3/MM3 (ref 3.4–10.8)

## 2019-08-07 PROCEDURE — 99214 OFFICE O/P EST MOD 30 MIN: CPT | Performed by: INTERNAL MEDICINE

## 2019-08-07 PROCEDURE — 82746 ASSAY OF FOLIC ACID SERUM: CPT | Performed by: INTERNAL MEDICINE

## 2019-08-07 PROCEDURE — G0463 HOSPITAL OUTPT CLINIC VISIT: HCPCS | Performed by: INTERNAL MEDICINE

## 2019-08-07 PROCEDURE — 1157F ADVNC CARE PLAN IN RCRD: CPT | Performed by: INTERNAL MEDICINE

## 2019-08-07 PROCEDURE — 82728 ASSAY OF FERRITIN: CPT | Performed by: INTERNAL MEDICINE

## 2019-08-07 PROCEDURE — 85025 COMPLETE CBC W/AUTO DIFF WBC: CPT | Performed by: INTERNAL MEDICINE

## 2019-08-07 PROCEDURE — G9903 PT SCRN TBCO ID AS NON USER: HCPCS | Performed by: INTERNAL MEDICINE

## 2019-08-07 PROCEDURE — G8731 PAIN NEG NO PLAN: HCPCS | Performed by: INTERNAL MEDICINE

## 2019-08-07 PROCEDURE — 84466 ASSAY OF TRANSFERRIN: CPT | Performed by: INTERNAL MEDICINE

## 2019-08-07 PROCEDURE — 83540 ASSAY OF IRON: CPT | Performed by: INTERNAL MEDICINE

## 2019-08-07 PROCEDURE — 82607 VITAMIN B-12: CPT | Performed by: INTERNAL MEDICINE

## 2019-08-07 NOTE — PROGRESS NOTES
DATE OF VISIT: 8/7/2019      REASON FOR VISIT:  Mechanical mitral valve on Coumadin, problem having Coumadin level in the therapeutic range ,Anemia, thrombocytosis      HISTORY OF PRESENT ILLNESS:    39-year-old female with a past medical history significant for open heart surgery with mitral valve replacement with mechanical valve in July 2015, history of AICD placement of week later after open-heart surgery secondary to tachycardia, congestive heart failure has been on Coumadin since July 2015 secondary to mechanical mitral valve.  Patient was initially seen in consultation on September 12, 2017 for evaluation of problem with Coumadin not being in therapeutic range on a persistent basis.  Subsequently patient was referred to Coumadin clinic for monitoring of INR.   Due to recurrent TIA in subtherapeutic INR in between, patient was referred to [facility] where she was evaluated by Dr. Cabrera on July 9, 2018.  States she had another episode of TIA on August 18, 2018 where she was evaluated at [facility].  Denies any more TIA episodes since August 2018.  Her most recent INR on August 2,2019 was 2.8.  Patient states she had a hernia surgery done on July 12, 2019.  Denies any bleeding denies any need of blood transfusion after surgery.  Denies any bleeding.        PAST MEDICAL HISTORY:    Past Medical History:   Diagnosis Date   • AICD (automatic cardioverter/defibrillator) present    • Anticoagulation goal of INR 2.5 to 3.5    • Asthma    • Atrial fibrillation (CMS/HCC)    • Cardiomyopathy (CMS/HCC)    • Congestive heart failure (CMS/HCC)    • Diabetes mellitus (CMS/HCC)    • Disease of thyroid gland     hypothyroidism    • Enlarged heart    • GERD (gastroesophageal reflux disease)    • Hx of mitral valve replacement with mechanical valve     with thrombus 7/2017   • Hyperlipidemia    • Migraine    • MRSA (methicillin resistant Staphylococcus aureus) 2015    boil   • PONV (postoperative nausea and  vomiting)    • Sleep apnea    • TIA (transient ischemic attack)        SOCIAL HISTORY:    Social History     Tobacco Use   • Smoking status: Former Smoker   • Smokeless tobacco: Never Used   Substance Use Topics   • Alcohol use: No   • Drug use: No       Surgical History :  Past Surgical History:   Procedure Laterality Date   • A-V CARDIAC PACEMAKER INSERTION     • APPENDECTOMY     • ATRIAL CARDIAC PACEMAKER INSERTION     • CARDIAC CATHETERIZATION     • CARDIAC SURGERY     • CARDIAC VALVE SURGERY     • CYSTOSCOPY  09/23/2015   • HYSTERECTOMY     • TONSILLECTOMY     • TRANSESOPHAGEAL ECHOCARDIOGRAM (CABRERA)     • VENTRAL/INCISIONAL HERNIA REPAIR N/A 7/12/2019    Procedure: Open Ventral Hernia Repair with mesh;  Surgeon: Tuan Hicks MD;  Location: [facility];  Service: General       ALLERGIES:    Allergies   Allergen Reactions   • Hydrocodone Nausea And Vomiting   • Hydrocodone Bitartrate GI Intolerance         FAMILY HISTORY:  Family History   Problem Relation Age of Onset   • Diabetes Father    • Hypertension Father    • Diabetes Maternal Grandmother    • Kidney disease Maternal Grandmother    • Hypertension Maternal Grandmother    • Heart disease Maternal Grandmother    • Stroke Maternal Grandmother    • Thyroid disease Maternal Grandmother    • Cancer Maternal Grandfather    • Kidney disease Paternal Grandmother    • Diabetes Paternal Grandfather            REVIEW OF SYSTEMS:      CONSTITUTIONAL:  Complains of fatigue. Has gained 3 pounds since last clinic visit. Denies any fever, chills or weight loss.      HEENT:  No epistaxis, mouth sores or difficulty swallowing.     RESPIRATORY:  No new shortness of breath. No new cough or hemoptysis.     CARDIOVASCULAR:  Complains of chest pain with exertion.  No palpitation.     GASTROINTESTINAL: Complains of abdominal discomfort due to recent hernia surgery.  No  nausea, vomiting or blood in the stool.     GENITOURINARY:Complains of intermittent vaginal spotting.   "  No Dysuria or Hematuria.     MUSCULOSKELETAL:  No new back pain or arthralgia..     LYMPHATICS:  Denies any abnormal swollen glands anywhere in the body.     NEUROLOGICAL : Had seizure with most recent episode of TIA in August 2018.  Complains of tingling and numbness affecting the right thigh since episode of hemiparesis in July 2017.  No new headaches or dizziness.   No  balance problems.     SKIN:  Denies any new skin rash.        PHYSICAL EXAMINATION:      VITAL SIGNS:  /72   Pulse 83   Temp 98.6 °F (37 °C) (Temporal)   Resp 18   Ht 167.6 cm (65.98\")   Wt 105 kg (232 lb 6.4 oz)   LMP 04/30/2015 (Within Months) Comment: Partial Hysterectomy  SpO2 98%   BMI 37.53 kg/m²       08/07/19  1021   Weight: 105 kg (232 lb 6.4 oz)     ECOG performance status: 2    GENERAL:  Not in any distress.Obese female.    HEENT:  Normocephalic, Atraumatic.Mild Conjunctival pallor. No icterus. Extraocular Movements Intact. No Facial Asymmetry noted.    NECK:  No adenopathy. No JVD.  Trachea in midline.    RESPIRATORY:  Fair air entry bilateral. No rhonchi or wheezing.    CARDIOVASCULAR:  S1, S2. Regular rate and rhythm. No murmur or gallop appreciated.  Prosthetic valve sound heard.    ABDOMEN:  Soft, obese, nontender. Bowel sounds present in all four quadrants.  No hepatosplenomegaly appreciated.  Healed surgical scar present in midline.    MUSCULOSKELTAL:  No edema.No Calf Tenderness.  Decreased range of motion.    NEUROLOGIC:  Alert, awake and oriented ×3.  No  Motor  deficit appreciated.     SKIN: No new skin lesions    LYMPHATICS: No new lymph node enlargement in neck or supraclavicular area.            DIAGNOSTIC DATA:    Glucose   Date Value Ref Range Status   07/13/2019 116 (H) 65 - 99 mg/dL Final     Sodium   Date Value Ref Range Status   07/13/2019 140 136 - 145 mmol/L Final   03/13/2019 137 134 - 144 mmol/L Final   10/15/2018 139 137 - 145 mmol/L Final   08/21/2018 140 136 - 145 mmol/L Final     Potassium "   Date Value Ref Range Status   07/13/2019 3.4 (L) 3.5 - 5.2 mmol/L Final   03/13/2019 4.1 3.5 - 5.1 mmol/L Final   10/15/2018 4.1 3.5 - 5.1 mmol/L Final   08/21/2018 4.0 3.3 - 5.0 mmol/L Final     CO2   Date Value Ref Range Status   07/13/2019 24.0 22.0 - 29.0 mmol/L Final   10/15/2018 27 22 - 30 mmol/L Final     Chloride   Date Value Ref Range Status   07/13/2019 106 98 - 107 mmol/L Final   03/13/2019 101 98 - 107 mmol/L Final   10/15/2018 105 98 - 107 mmol/L Final   08/21/2018 106 99 - 111 mmol/L Final     Anion Gap   Date Value Ref Range Status   07/13/2019 10.0 5.0 - 15.0 mmol/L Final   08/21/2018 13 4 - 16 mmol/L Final     Creatinine   Date Value Ref Range Status   07/13/2019 0.94 0.57 - 1.00 mg/dL Final   03/13/2019 1.1 0.6 - 1.3 mg/dL Final     Comment:     **The MDRD GFR formula is valid only for ages 18-70.    GFR will not be reported for individuals aging <18 or >70.   10/15/2018 0.8 0.7 - 1.5 mg/dL Final   08/21/2018 0.9 0.6 - 1.3 mg/dL Final     BUN   Date Value Ref Range Status   07/13/2019 9 6 - 20 mg/dL Final   03/13/2019 17 7 - 18 mg/dL Final     BUN/Creatinine Ratio   Date Value Ref Range Status   07/13/2019 9.6 7.0 - 25.0 Final   10/15/2018 17.1 RATIO Final     Calcium   Date Value Ref Range Status   07/13/2019 8.5 (L) 8.6 - 10.5 mg/dL Final   03/13/2019 8.9 8.8 - 10.5 mg/dL Final     eGFR Non  Amer   Date Value Ref Range Status   07/13/2019 66 >60 mL/min/1.73 Final     Alkaline Phosphatase   Date Value Ref Range Status   03/13/2019 71 46 - 116 U/L Final     Total Protein   Date Value Ref Range Status   03/13/2019 7.3 6.4 - 8.2 g/dL Final     ALT (SGPT)   Date Value Ref Range Status   03/13/2019 32 30 - 65 U/L Final     AST (SGOT)   Date Value Ref Range Status   03/13/2019 17 15 - 37 U/L Final     Total Bilirubin   Date Value Ref Range Status   03/13/2019 0.3 0 - 1.0 mg/dL Final     Albumin   Date Value Ref Range Status   03/13/2019 3.5 3.4 - 5.0 g/dL Final     Globulin   Date Value Ref  Range Status   10/15/2018 2.8 1.5 - 4.5 g/dL Final     A/G Ratio   Date Value Ref Range Status   10/15/2018 1.4 1.1 - 2.5 RATIO Final     Lab Results   Component Value Date    WBC 8.68 08/07/2019    HGB 11.1 (L) 08/07/2019    HCT 35.0 08/07/2019    MCV 83.3 08/07/2019     (H) 08/07/2019     Lab Results   Component Value Date    NEUTROABS 5.91 08/07/2019    IRON 88 08/07/2019    TIBC 375 08/07/2019    LABIRON 23 08/07/2019    FERRITIN 158.20 (H) 08/07/2019    [id number] 1,557 (H) 04/10/2019    FOLATE >20.00 04/10/2019         Anti cardiolipin profile (SO)4/21/2018  [facility]  Component Name Value Ref Range   ANC IGG <9   Comment:  (NOTE)                           Negative:              <15                           Indeterminate:     15 - 20                           Low-Med Positive: >20 - 80                           High Positive:         >80 0 - 14 GPL/mL    ANC IGM 10   Comment:  (NOTE)                           Negative:              <13                           Indeterminate:     13 - 20                           Low-Med Positive: >20 - 80                           High Positive:         >80 0 - 12 MPL/mL    ANC IGA <9   Comment:  (NOTE)                           Negative:              <12                           Indeterminate:     12 - 20                           Low-Med Positive: >20 - 80                           High Positive:         >80  Performed At: [facility]  [address] [place] [id number]  Kanika Tarango PhD Ph:[phone]  Test performed at [address] [place] [address]          Antithrombin III (ATIII) (SO)4/21/2018  [facility]  Component Name Value Ref Range   AT3 95   Comment:  (NOTE)  Direct Xa inhibitor anticoagulants such as rivaroxaban, apixaban and  edoxaban will lead to spuriously elevated antithrombin activity  levels possibly masking a deficiency.  Performed At: [address] [id number]  Savage COHEN MD  Ph:[phone]  Test performed at [facility] 75 - 135 %   Specimen   Citrated Plasma       FACTOR 5 (V) [place] (SO)4/21/2018  [facility]  Component Name Value Ref Range   RESULTS Comment   Comment:  (NOTE)  Result:  Negative (no mutation found)        Lupus inhibitor screen (SO)4/21/2018  [facility]  Component Name Value Ref Range   PTT-LA 43.6 0.0 - 51.9 s   DRVVT 55.3 (H) 0.0 - 47.0 s   LUPUS REFLEX INTERP Comment:   Comment:  (NOTE)  No lupus anticoagulant was detected. An extended dRVVT that corrects  on mixing with normal plasma can be caused by a deficiency of one of  the common pathway factors (X, V, II or fibrinogen).  Performed At: [facility]  [address], [place] [id number]  Savage COHEN MD Ph:[phone]  Test performed at [address] [place] [address]     Specimen         Protein C activity (SO)4/21/2018  [facility]  Component Name Value Ref Range   PROTEIN C ACTIVITY 43 (L)   Comment:        Protein S activity (SO)4/21/2018  [facility]  Component Name Value Ref Range   PROTEIN S ACTIVITY 40 (L)   Comment:  (NOTE)        Prothrombin 29101 (SO)4/21/2018  [facility]  Component Name Value Ref Range   RESULTS Comment   Comment:  (NOTE)  NEGATIVE  No mutation identified.            Most recent INR done on August 2, 2019 is 2.8        RADIOLOGY DATA:   CT angiogram of the head done at [facility] on April 21, 2018 showed:  Result Impression        Anatomic variants of the [place] as above. No radiographic evidence of vascular occlusion, hemodynamically significant stenosis, or aneurysm are identified.       CT Angiogram of the neck done at [facility] on April 21, 2018 showed:  Result Impression       No hemodynamically significant stenosis identified           Assessment and plan:    1.  Problems with getting therapeutic INR range with Coumadin: Patient states she has been Coumadin since July 2015 secondary to  her mechanical mitral valve.    -And patient was here at Coumadin clinic, her INR was never staging therapeutic despite of trying different dose of Coumadin.  -Patient is currently being anticoagulated with Coumadin and intermittent Lovenox under care of Dr. Chandler.    -Most recent INR  on August 2,2019 was 2.8.  She is scheduled to get another INR check today.  -Patient was evaluated by Dr. Cabrera in [place] for second opinion on July 9, 2018.  He recommend continuing with Coumadin with intermittent Lovenox if required.  -We will ask patient to return to clinic in 3 months with a repeat CBC and anemia workup on that day.    2.  Recurrent TIA:  -Patient is having episodes of TIA without any deficit at present.  -Recent CT angiogram of the head as well as CT angiogram of neck does not show any evidence of blockage.  -Hypercoagulable workup done at [facility] in April 2018 is negative except for low protein C and low protein S level.  But patient was taking Coumadin on that point and that can lower protein C and protein S.  Result of hypercoagulable workup were discussed with patient on June 14, 2018.  -Patient is scheduled to get inpatient evaluation for her recurrent TIA and seizure like activity in [place] in October 2018    3.  Anemia:  -Patient is found to have worsening anemia with a hemoglobin of 11.1.  -Anemia work-up done today on August 7, 2019 shows adequate amount of iron.  Recommend continuing with ferrous sulfate 1 tablet p.o. daily.  - B12 and folate level has decreased since stopping B12 and folic acid 4 months ago.  Will restart patient on vitamin B12 thousand micrograms p.o. daily and folic acid 1 mg p.o. daily.      4.  Thrombocytosis:( problem is new to me)  -Secondary to anemia most likely plus or minus recent surgery.  - Platelet count is 541,000.  Will monitor with CBC for now    5.  Intracranial aneurysm: Patient was found to have aneurysm which was incidental finding.   Currently  being followed by neurosurgeon, no other intervention is planned at this point.     6.  Health maintenance: Patient does not smoke currently.  She is only 39 years of age and not due for colonoscopy at this point.      7. Advance care planning: Patient has advanced directive on file.    8. BMI: Patient's Body mass index is 37.53 kg/m². BMI is higher than reference range.  Patient was notified about her elevated BMI.    9.  Pain assessment:  -Patient denies any pain today.          Remington Welch MD  8/7/2019  5:57 PM        EMR Dragon/Transcription disclaimer:   Much of this encounter note is an electronic transcription/translation of spoken language to printed text. The electronic translation of spoken language may permit erroneous, or at times, nonsensical words or phrases to be inadvertently transcribed; Although I have reviewed the note for such errors, some may still exist.

## 2019-08-08 ENCOUNTER — TELEPHONE (OUTPATIENT)
Dept: ONCOLOGY | Facility: HOSPITAL | Age: 39
End: 2019-08-08

## 2019-08-08 RX ORDER — FOLIC ACID 1 MG/1
1 TABLET ORAL DAILY
Qty: 90 TABLET | Refills: 1 | Status: SHIPPED | OUTPATIENT
Start: 2019-08-08 | End: 2020-03-31 | Stop reason: SDUPTHER

## 2019-08-08 RX ORDER — LANOLIN ALCOHOL/MO/W.PET/CERES
1000 CREAM (GRAM) TOPICAL DAILY
Qty: 90 TABLET | Refills: 1 | Status: SHIPPED | OUTPATIENT
Start: 2019-08-08

## 2019-08-08 NOTE — TELEPHONE ENCOUNTER
----- Message from Remington Welch MD sent at 8/8/2019  8:29 AM CDT -----  Please let patient know, she needs to continue with ferrous sulfate 1 tablet p.o. daily.  She needs to restart taking vitamin B12 and folic acid.  I have sent prescription for B12 and folic acid to her pharmacy.  Thank you

## 2019-09-05 ENCOUNTER — OFFICE VISIT (OUTPATIENT)
Dept: SURGERY | Facility: CLINIC | Age: 39
End: 2019-09-05

## 2019-09-05 VITALS
TEMPERATURE: 97.1 F | HEIGHT: 66 IN | DIASTOLIC BLOOD PRESSURE: 78 MMHG | WEIGHT: 228.8 LBS | BODY MASS INDEX: 36.77 KG/M2 | SYSTOLIC BLOOD PRESSURE: 110 MMHG | HEART RATE: 88 BPM

## 2019-09-05 DIAGNOSIS — Z09 FOLLOW UP: Primary | ICD-10-CM

## 2019-09-05 PROCEDURE — 99024 POSTOP FOLLOW-UP VISIT: CPT | Performed by: SURGERY

## 2019-09-05 RX ORDER — WARFARIN SODIUM 7.5 MG/1
7.5 TABLET ORAL
COMMUNITY

## 2019-09-05 RX ORDER — ONDANSETRON 4 MG/1
4 TABLET, FILM COATED ORAL EVERY 8 HOURS PRN
COMMUNITY

## 2019-09-05 RX ORDER — WARFARIN SODIUM 10 MG/1
10 TABLET ORAL
COMMUNITY

## 2019-09-05 NOTE — PROGRESS NOTES
CHIEF COMPLAINT:    Chief Complaint   Patient presents with   • Follow-up     1 Mon. josef- S/P Open incisional hernia repair 7-12-19.       HISTORY OF PRESENT ILLNESS:    Frances Motta is a 39 y.o. female who underwent open incisional hernia repair with mesh on 7/12/2019.  She returns today for additional follow-up.  She has no complaints related to her surgery today.  She was recently placed on fluid restriction as well as a low sodium diet due to CHF.  She has been struggling somewhat with this.    EXAM:  Vitals:    09/05/19 0858   BP: 110/78   Pulse: 88   Temp: 97.1 °F (36.2 °C)         Abdomen soft, incision well-healed, no palpable hernia    ASSESSMENT:    Status post open incisional hernia repair    PLAN:    Overall doing well.  Can return activities as tolerated.  See me as needed.          This document has been electronically signed by Tuan Hicks MD on September 5, 2019 9:25 AM

## 2019-10-29 ENCOUNTER — LAB (OUTPATIENT)
Dept: ONCOLOGY | Facility: HOSPITAL | Age: 39
End: 2019-10-29

## 2019-10-29 ENCOUNTER — OFFICE VISIT (OUTPATIENT)
Dept: ONCOLOGY | Facility: CLINIC | Age: 39
End: 2019-10-29

## 2019-10-29 VITALS
DIASTOLIC BLOOD PRESSURE: 77 MMHG | TEMPERATURE: 97.5 F | SYSTOLIC BLOOD PRESSURE: 109 MMHG | RESPIRATION RATE: 18 BRPM | WEIGHT: 232.6 LBS | OXYGEN SATURATION: 99 % | HEIGHT: 66 IN | BODY MASS INDEX: 37.38 KG/M2 | HEART RATE: 73 BPM

## 2019-10-29 DIAGNOSIS — Z79.01 SUBTHERAPEUTIC ANTICOAGULATION: ICD-10-CM

## 2019-10-29 DIAGNOSIS — D75.839 THROMBOCYTOSIS: ICD-10-CM

## 2019-10-29 DIAGNOSIS — Z51.81 SUBTHERAPEUTIC ANTICOAGULATION: ICD-10-CM

## 2019-10-29 DIAGNOSIS — D64.9 ANEMIA, UNSPECIFIED TYPE: Primary | ICD-10-CM

## 2019-10-29 DIAGNOSIS — D64.9 ANEMIA, UNSPECIFIED TYPE: ICD-10-CM

## 2019-10-29 LAB
BASOPHILS # BLD AUTO: 0.03 10*3/MM3 (ref 0–0.2)
BASOPHILS NFR BLD AUTO: 0.4 % (ref 0–1.5)
DEPRECATED RDW RBC AUTO: 44.4 FL (ref 37–54)
EOSINOPHIL # BLD AUTO: 0.16 10*3/MM3 (ref 0–0.4)
EOSINOPHIL NFR BLD AUTO: 1.9 % (ref 0.3–6.2)
ERYTHROCYTE [DISTWIDTH] IN BLOOD BY AUTOMATED COUNT: 14.6 % (ref 12.3–15.4)
FERRITIN SERPL-MCNC: 97.81 NG/ML (ref 13–150)
FOLATE SERPL-MCNC: >20 NG/ML (ref 4.78–24.2)
HCT VFR BLD AUTO: 37.3 % (ref 34–46.6)
HGB BLD-MCNC: 11.9 G/DL (ref 12–15.9)
IMM GRANULOCYTES # BLD AUTO: 0.05 10*3/MM3 (ref 0–0.05)
IMM GRANULOCYTES NFR BLD AUTO: 0.6 % (ref 0–0.5)
IRON 24H UR-MRATE: 87 MCG/DL (ref 37–145)
IRON SATN MFR SERPL: 22 % (ref 20–50)
LYMPHOCYTES # BLD AUTO: 2.14 10*3/MM3 (ref 0.7–3.1)
LYMPHOCYTES NFR BLD AUTO: 25.6 % (ref 19.6–45.3)
MCH RBC QN AUTO: 26.7 PG (ref 26.6–33)
MCHC RBC AUTO-ENTMCNC: 31.9 G/DL (ref 31.5–35.7)
MCV RBC AUTO: 83.8 FL (ref 79–97)
MONOCYTES # BLD AUTO: 0.52 10*3/MM3 (ref 0.1–0.9)
MONOCYTES NFR BLD AUTO: 6.2 % (ref 5–12)
NEUTROPHILS # BLD AUTO: 5.47 10*3/MM3 (ref 1.7–7)
NEUTROPHILS NFR BLD AUTO: 65.3 % (ref 42.7–76)
NRBC BLD AUTO-RTO: 0 /100 WBC (ref 0–0.2)
PLATELET # BLD AUTO: 372 10*3/MM3 (ref 140–450)
PMV BLD AUTO: 9 FL (ref 6–12)
RBC # BLD AUTO: 4.45 10*6/MM3 (ref 3.77–5.28)
TIBC SERPL-MCNC: 389 MCG/DL (ref 298–536)
TRANSFERRIN SERPL-MCNC: 261 MG/DL (ref 200–360)
VIT B12 BLD-MCNC: 1037 PG/ML (ref 211–946)
WBC NRBC COR # BLD: 8.37 10*3/MM3 (ref 3.4–10.8)

## 2019-10-29 PROCEDURE — G0463 HOSPITAL OUTPT CLINIC VISIT: HCPCS | Performed by: INTERNAL MEDICINE

## 2019-10-29 PROCEDURE — 99214 OFFICE O/P EST MOD 30 MIN: CPT | Performed by: INTERNAL MEDICINE

## 2019-10-29 PROCEDURE — 82746 ASSAY OF FOLIC ACID SERUM: CPT | Performed by: INTERNAL MEDICINE

## 2019-10-29 PROCEDURE — 83540 ASSAY OF IRON: CPT | Performed by: INTERNAL MEDICINE

## 2019-10-29 PROCEDURE — 84466 ASSAY OF TRANSFERRIN: CPT | Performed by: INTERNAL MEDICINE

## 2019-10-29 PROCEDURE — 85025 COMPLETE CBC W/AUTO DIFF WBC: CPT | Performed by: INTERNAL MEDICINE

## 2019-10-29 PROCEDURE — 1157F ADVNC CARE PLAN IN RCRD: CPT | Performed by: INTERNAL MEDICINE

## 2019-10-29 PROCEDURE — 82607 VITAMIN B-12: CPT | Performed by: INTERNAL MEDICINE

## 2019-10-29 PROCEDURE — G9903 PT SCRN TBCO ID AS NON USER: HCPCS | Performed by: INTERNAL MEDICINE

## 2019-10-29 PROCEDURE — G8730 PAIN DOC POS AND PLAN: HCPCS | Performed by: INTERNAL MEDICINE

## 2019-10-29 PROCEDURE — 82728 ASSAY OF FERRITIN: CPT | Performed by: INTERNAL MEDICINE

## 2019-10-29 NOTE — PROGRESS NOTES
DATE OF VISIT: 10/29/2019      REASON FOR VISIT:  Mechanical mitral valve on Coumadin, problem having Coumadin level in the therapeutic range ,Anemia, thrombocytosis      HISTORY OF PRESENT ILLNESS:    39-year-old female with a past medical history significant for open heart surgery with mitral valve replacement with mechanical valve in July 2015, history of AICD placement of week later after open-heart surgery secondary to tachycardia, congestive heart failure has been on Coumadin since July 2015 secondary to mechanical mitral valve.  Patient was initially seen in consultation on September 12, 2017 for evaluation of problem with Coumadin not being in therapeutic range on a persistent basis.  Subsequently patient was referred to Coumadin clinic for monitoring of INR.   Due to recurrent TIA in subtherapeutic INR in between, patient was referred to [facility] where she was evaluated by Dr. Cabrera on July 9, 2018.  States she had another episode of TIA on August 18, 2018 where she was evaluated at [facility].  Denies any more TIA episodes since August 2018. Patient is here for follow up appointment today. States her fatigue is improved.  Denies any bleeding denies any need of blood transfusion after surgery.  Denies any bleeding.        PAST MEDICAL HISTORY:    Past Medical History:   Diagnosis Date   • AICD (automatic cardioverter/defibrillator) present    • Anticoagulation goal of INR 2.5 to 3.5    • Asthma    • Atrial fibrillation (CMS/HCC)    • Cardiomyopathy (CMS/HCC)    • Congestive heart failure (CMS/HCC)    • Diabetes mellitus (CMS/HCC)    • Disease of thyroid gland     hypothyroidism    • Enlarged heart    • GERD (gastroesophageal reflux disease)    • Hx of mitral valve replacement with mechanical valve     with thrombus 7/2017   • Hyperlipidemia    • Migraine    • MRSA (methicillin resistant Staphylococcus aureus) 2015    boil   • PONV (postoperative nausea and vomiting)    • Sleep apnea     • TIA (transient ischemic attack)        SOCIAL HISTORY:    Social History     Tobacco Use   • Smoking status: Former Smoker   • Smokeless tobacco: Never Used   Substance Use Topics   • Alcohol use: No   • Drug use: No       Surgical History :  Past Surgical History:   Procedure Laterality Date   • A-V CARDIAC PACEMAKER INSERTION     • APPENDECTOMY     • ATRIAL CARDIAC PACEMAKER INSERTION     • CARDIAC CATHETERIZATION     • CARDIAC SURGERY     • CARDIAC VALVE SURGERY     • CYSTOSCOPY  09/23/2015   • HYSTERECTOMY     • TONSILLECTOMY     • TRANSESOPHAGEAL ECHOCARDIOGRAM (CABRERA)     • VENTRAL/INCISIONAL HERNIA REPAIR N/A 7/12/2019    Procedure: Open Ventral Hernia Repair with mesh;  Surgeon: Tuan Hicks MD;  Location: [facility];  Service: General       ALLERGIES:    Allergies   Allergen Reactions   • Hydrocodone Nausea And Vomiting   • Hydrocodone Bitartrate GI Intolerance         FAMILY HISTORY:  Family History   Problem Relation Age of Onset   • Diabetes Father    • Hypertension Father    • Diabetes Maternal Grandmother    • Kidney disease Maternal Grandmother    • Hypertension Maternal Grandmother    • Heart disease Maternal Grandmother    • Stroke Maternal Grandmother    • Thyroid disease Maternal Grandmother    • Cancer Maternal Grandfather    • Kidney disease Paternal Grandmother    • Diabetes Paternal Grandfather            REVIEW OF SYSTEMS:      CONSTITUTIONAL:  States her fatigue is improved. No recent weight change. Denies any fever, chills or weight loss.      HEENT:  No epistaxis, mouth sores or difficulty swallowing.     RESPIRATORY:  No new shortness of breath. No new cough or hemoptysis.     CARDIOVASCULAR:  Complains of chest pain with exertion.  No palpitation.     GASTROINTESTINAL: No abdominal pain, nausea, vomiting or blood in the stool.     GENITOURINARY:Complains of intermittent vaginal spotting.    No Dysuria or Hematuria.     MUSCULOSKELETAL:  No new back pain or  "arthralgia..     LYMPHATICS:  Denies any abnormal swollen glands anywhere in the body.     NEUROLOGICAL : Had seizure with most recent episode of TIA in August 2018.  Complains of tingling and numbness affecting the right thigh since episode of hemiparesis in July 2017.  No new headaches or dizziness.   No  balance problems.     SKIN:  Denies any new skin rash.        PHYSICAL EXAMINATION:      VITAL SIGNS:  /77   Pulse 73   Temp 97.5 °F (36.4 °C) (Temporal)   Resp 18   Ht 167.6 cm (65.98\")   Wt 106 kg (232 lb 9.6 oz)   LMP 04/30/2015 (Within Months) Comment: Partial Hysterectomy  SpO2 99%   BMI 37.56 kg/m²       10/29/19  1028   Weight: 106 kg (232 lb 9.6 oz)     ECOG performance status: 2    GENERAL:  Not in any distress.Obese female.    HEENT:  Normocephalic, Atraumatic.Mild Conjunctival pallor. No icterus. Extraocular Movements Intact. No Facial Asymmetry noted.    NECK:  No adenopathy. No JVD.  Trachea in midline.    RESPIRATORY:  Fair air entry bilateral. No rhonchi or wheezing.    CARDIOVASCULAR:  S1, S2. Regular rate and rhythm. No murmur or gallop appreciated.  Prosthetic valve sound heard.    ABDOMEN:  Soft, obese, nontender. Bowel sounds present in all four quadrants.  No hepatosplenomegaly appreciated.  Healed surgical scar present in midline.    MUSCULOSKELTAL:  No edema.No Calf Tenderness.  Decreased range of motion.    NEUROLOGIC:  Alert, awake and oriented ×3.  No  Motor  deficit appreciated.     SKIN: No new skin lesions    LYMPHATICS: No new lymph node enlargement in neck or supraclavicular area.            DIAGNOSTIC DATA:    Glucose   Date Value Ref Range Status   07/13/2019 116 (H) 65 - 99 mg/dL Final     Sodium   Date Value Ref Range Status   07/13/2019 140 136 - 145 mmol/L Final   03/13/2019 137 134 - 144 mmol/L Final   10/15/2018 139 137 - 145 mmol/L Final   08/21/2018 140 136 - 145 mmol/L Final     Potassium   Date Value Ref Range Status   07/13/2019 3.4 (L) 3.5 - 5.2 mmol/L " Final   03/13/2019 4.1 3.5 - 5.1 mmol/L Final   10/15/2018 4.1 3.5 - 5.1 mmol/L Final   08/21/2018 4.0 3.3 - 5.0 mmol/L Final     CO2   Date Value Ref Range Status   07/13/2019 24.0 22.0 - 29.0 mmol/L Final   10/15/2018 27 22 - 30 mmol/L Final     Chloride   Date Value Ref Range Status   07/13/2019 106 98 - 107 mmol/L Final   03/13/2019 101 98 - 107 mmol/L Final   10/15/2018 105 98 - 107 mmol/L Final   08/21/2018 106 99 - 111 mmol/L Final     Anion Gap   Date Value Ref Range Status   07/13/2019 10.0 5.0 - 15.0 mmol/L Final   08/21/2018 13 4 - 16 mmol/L Final     Creatinine   Date Value Ref Range Status   07/13/2019 0.94 0.57 - 1.00 mg/dL Final   03/13/2019 1.1 0.6 - 1.3 mg/dL Final     Comment:     **The MDRD GFR formula is valid only for ages 18-70.    GFR will not be reported for individuals aging <18 or >70.   10/15/2018 0.8 0.7 - 1.5 mg/dL Final   08/21/2018 0.9 0.6 - 1.3 mg/dL Final     BUN   Date Value Ref Range Status   07/13/2019 9 6 - 20 mg/dL Final   03/13/2019 17 7 - 18 mg/dL Final     BUN/Creatinine Ratio   Date Value Ref Range Status   07/13/2019 9.6 7.0 - 25.0 Final   10/15/2018 17.1 RATIO Final     Calcium   Date Value Ref Range Status   07/13/2019 8.5 (L) 8.6 - 10.5 mg/dL Final   03/13/2019 8.9 8.8 - 10.5 mg/dL Final     eGFR Non  Amer   Date Value Ref Range Status   07/13/2019 66 >60 mL/min/1.73 Final     Alkaline Phosphatase   Date Value Ref Range Status   03/13/2019 71 46 - 116 U/L Final     Total Protein   Date Value Ref Range Status   03/13/2019 7.3 6.4 - 8.2 g/dL Final     ALT (SGPT)   Date Value Ref Range Status   03/13/2019 32 30 - 65 U/L Final     AST (SGOT)   Date Value Ref Range Status   03/13/2019 17 15 - 37 U/L Final     Total Bilirubin   Date Value Ref Range Status   03/13/2019 0.3 0 - 1.0 mg/dL Final     Albumin   Date Value Ref Range Status   03/13/2019 3.5 3.4 - 5.0 g/dL Final     Globulin   Date Value Ref Range Status   10/15/2018 2.8 1.5 - 4.5 g/dL Final     A/G Ratio   Date  Value Ref Range Status   10/15/2018 1.4 1.1 - 2.5 RATIO Final     Lab Results   Component Value Date    WBC 8.37 10/29/2019    HGB 11.9 (L) 10/29/2019    HCT 37.3 10/29/2019    MCV 83.8 10/29/2019     10/29/2019     Lab Results   Component Value Date    NEUTROABS 5.47 10/29/2019    IRON 87 10/29/2019    TIBC 389 10/29/2019    LABIRON 22 10/29/2019    FERRITIN 97.81 10/29/2019    [id number] 1,037 (H) 10/29/2019    FOLATE >20.00 10/29/2019         Anti cardiolipin profile (SO)4/21/2018  [facility]  Component Name Value Ref Range   ANC IGG <9   Comment:  (NOTE)                           Negative:              <15                           Indeterminate:     15 - 20                           Low-Med Positive: >20 - 80                           High Positive:         >80 0 - 14 GPL/mL    ANC IGM 10   Comment:  (NOTE)                           Negative:              <13                           Indeterminate:     13 - 20                           Low-Med Positive: >20 - 80                           High Positive:         >80 0 - 12 MPL/mL    ANC IGA <9   Comment:  (NOTE)                           Negative:              <12                           Indeterminate:     12 - 20                           Low-Med Positive: >20 - 80                           High Positive:         >80  Performed At: [facility]  [address] [place] [id number]  Kanika Tarango PhD Ph:[phone]  Test performed at [address] [place] [address]          Antithrombin III (ATIII) (SO)4/21/2018  [facility]  Component Name Value Ref Range   AT3 95   Comment:  (NOTE)  Direct Xa inhibitor anticoagulants such as rivaroxaban, apixaban and  edoxaban will lead to spuriously elevated antithrombin activity  levels possibly masking a deficiency.  Performed At: [address] [id number]  Savage COHEN MD Ph:[phone]  Test performed at [address] [place] [address] 75 - 135 %    Specimen   Citrated Plasma       FACTOR 5 (V) [place] (SO)4/21/2018  [facility]  Component Name Value Ref Range   RESULTS Comment   Comment:  (NOTE)  Result:  Negative (no mutation found)        Lupus inhibitor screen (SO)4/21/2018  [facility]  Component Name Value Ref Range   PTT-LA 43.6 0.0 - 51.9 s   DRVVT 55.3 (H) 0.0 - 47.0 s   LUPUS REFLEX INTERP Comment:   Comment:  (NOTE)  No lupus anticoagulant was detected. An extended dRVVT that corrects  on mixing with normal plasma can be caused by a deficiency of one of  the common pathway factors (X, V, II or fibrinogen).  Performed At: [facility]  [address], [place] [id number]  Savage COHEN MD Ph:[phone]  Test performed at [address] [place] [address]     Specimen         Protein C activity (SO)4/21/2018  [facility]  Component Name Value Ref Range   PROTEIN C ACTIVITY 43 (L)   Comment:        Protein S activity (SO)4/21/2018  [facility]  Component Name Value Ref Range   PROTEIN S ACTIVITY 40 (L)   Comment:  (NOTE)        Prothrombin [address] (SO)4/21/2018  [facility]  Component Name Value Ref Range   RESULTS Comment   Comment:  (NOTE)  NEGATIVE  No mutation identified.            Most recent INR done on August 2, 2019 is 2.8        RADIOLOGY DATA:   CT angiogram of the head done at [facility] on April 21, 2018 showed:  Result Impression        Anatomic variants of the [place] as above. No radiographic evidence of vascular occlusion, hemodynamically significant stenosis, or aneurysm are identified.       CT Angiogram of the neck done at [facility] on April 21, 2018 showed:  Result Impression       No hemodynamically significant stenosis identified           Assessment and plan:    1.  Problems with getting therapeutic INR range with Coumadin: Patient states she has been Coumadin since July 2015 secondary to her mechanical mitral valve.    -And patient was here at Coumadin clinic, her  INR was never staging therapeutic despite of trying different dose of Coumadin.  -Patient is currently being anticoagulated with Coumadin and intermittent Lovenox under care of Dr. Chandler.    -Most recent INR  on October 28,2019 was 2.8.   -Patient was evaluated by Dr. Cabrera in [place] for second opinion on July 9, 2018.  He recommend continuing with Coumadin with intermittent Lovenox if required.  -We will ask patient to return to clinic in 3 months with a repeat CBC and anemia workup on that day.    2.  Recurrent TIA:  -Patient is having episodes of TIA without any deficit at present.  -Recent CT angiogram of the head as well as CT angiogram of neck does not show any evidence of blockage.  -Hypercoagulable workup done at [facility] in April 2018 is negative except for low protein C and low protein S level.  But patient was taking Coumadin on that point and that can lower protein C and protein S.  Result of hypercoagulable workup were discussed with patient on June 14, 2018.  -Patient is scheduled to get inpatient evaluation for her recurrent TIA and seizure like activity in [place] in October 2018    3.  Anemia:  -Hemoglobin is 11.9 today  -recommend continuing with Ferrous sulfate 1 tb daily  -Will decrease B12 and folic acid to 1 tb three times a week.      4.  Thrombocytosis  -resolved.Platelet count is 372,000.    5.  Intracranial aneurysm: Patient was found to have aneurysm which was incidental finding.   Currently being followed by neurosurgeon, no other intervention is planned at this point.     6.  Health maintenance: Patient does not smoke currently.  She is only 39 years of age and not due for colonoscopy at this point.      7. Advance care planning: Patient has advanced directive on file.    8. BMI: Patient's Body mass index is 37.56 kg/m². BMI is higher than reference range.  Patient was notified about her elevated BMI.    9.  Pain assessment:  -Patient complains of generalize pain.  Recommend following up with PMD for pain management.          Remington Welch MD  10/29/2019  8:58 PM        EMR Dragon/Transcription disclaimer:   Much of this encounter note is an electronic transcription/translation of spoken language to printed text. The electronic translation of spoken language may permit erroneous, or at times, nonsensical words or phrases to be inadvertently transcribed; Although I have reviewed the note for such errors, some may still exist.

## 2019-10-30 ENCOUNTER — TELEPHONE (OUTPATIENT)
Dept: ONCOLOGY | Facility: HOSPITAL | Age: 39
End: 2019-10-30

## 2019-10-30 NOTE — TELEPHONE ENCOUNTER
----- Message from Remington Welch MD sent at 10/29/2019  9:06 PM CDT -----  Please let patient know, she needs to continue with ferrous sulfate 1 tab po daily. She can decrese b12 and folic acid to 1 tab on Monday,wednesday and Friday.Thanks

## 2020-01-28 ENCOUNTER — APPOINTMENT (OUTPATIENT)
Dept: ONCOLOGY | Facility: HOSPITAL | Age: 40
End: 2020-01-28

## 2020-02-12 ENCOUNTER — LAB (OUTPATIENT)
Dept: ONCOLOGY | Facility: HOSPITAL | Age: 40
End: 2020-02-12

## 2020-02-12 ENCOUNTER — OFFICE VISIT (OUTPATIENT)
Dept: ONCOLOGY | Facility: CLINIC | Age: 40
End: 2020-02-12

## 2020-02-12 VITALS
DIASTOLIC BLOOD PRESSURE: 62 MMHG | SYSTOLIC BLOOD PRESSURE: 127 MMHG | TEMPERATURE: 97.5 F | WEIGHT: 223.6 LBS | RESPIRATION RATE: 18 BRPM | HEART RATE: 64 BPM | HEIGHT: 66 IN | BODY MASS INDEX: 35.93 KG/M2

## 2020-02-12 DIAGNOSIS — Z51.81 SUBTHERAPEUTIC ANTICOAGULATION: ICD-10-CM

## 2020-02-12 DIAGNOSIS — D64.9 ANEMIA, UNSPECIFIED TYPE: ICD-10-CM

## 2020-02-12 DIAGNOSIS — Z79.01 SUBTHERAPEUTIC ANTICOAGULATION: ICD-10-CM

## 2020-02-12 DIAGNOSIS — D64.9 ANEMIA, UNSPECIFIED TYPE: Primary | ICD-10-CM

## 2020-02-12 LAB
BASOPHILS # BLD AUTO: 0.05 10*3/MM3 (ref 0–0.2)
BASOPHILS NFR BLD AUTO: 0.6 % (ref 0–1.5)
DEPRECATED RDW RBC AUTO: 43.2 FL (ref 37–54)
EOSINOPHIL # BLD AUTO: 0.12 10*3/MM3 (ref 0–0.4)
EOSINOPHIL NFR BLD AUTO: 1.4 % (ref 0.3–6.2)
ERYTHROCYTE [DISTWIDTH] IN BLOOD BY AUTOMATED COUNT: 13.9 % (ref 12.3–15.4)
FERRITIN SERPL-MCNC: 72.34 NG/ML (ref 13–150)
FOLATE SERPL-MCNC: >20 NG/ML (ref 4.78–24.2)
HCT VFR BLD AUTO: 38.7 % (ref 34–46.6)
HGB BLD-MCNC: 12.6 G/DL (ref 12–15.9)
IMM GRANULOCYTES # BLD AUTO: 0.03 10*3/MM3 (ref 0–0.05)
IMM GRANULOCYTES NFR BLD AUTO: 0.3 % (ref 0–0.5)
IRON 24H UR-MRATE: 88 MCG/DL (ref 37–145)
IRON SATN MFR SERPL: 21 % (ref 20–50)
LYMPHOCYTES # BLD AUTO: 2.06 10*3/MM3 (ref 0.7–3.1)
LYMPHOCYTES NFR BLD AUTO: 23.8 % (ref 19.6–45.3)
MCH RBC QN AUTO: 27.9 PG (ref 26.6–33)
MCHC RBC AUTO-ENTMCNC: 32.6 G/DL (ref 31.5–35.7)
MCV RBC AUTO: 85.6 FL (ref 79–97)
MONOCYTES # BLD AUTO: 0.5 10*3/MM3 (ref 0.1–0.9)
MONOCYTES NFR BLD AUTO: 5.8 % (ref 5–12)
NEUTROPHILS # BLD AUTO: 5.89 10*3/MM3 (ref 1.7–7)
NEUTROPHILS NFR BLD AUTO: 68.1 % (ref 42.7–76)
NRBC BLD AUTO-RTO: 0 /100 WBC (ref 0–0.2)
PLATELET # BLD AUTO: 407 10*3/MM3 (ref 140–450)
PMV BLD AUTO: 8.9 FL (ref 6–12)
RBC # BLD AUTO: 4.52 10*6/MM3 (ref 3.77–5.28)
TIBC SERPL-MCNC: 414 MCG/DL (ref 298–536)
TRANSFERRIN SERPL-MCNC: 278 MG/DL (ref 200–360)
VIT B12 BLD-MCNC: 848 PG/ML (ref 211–946)
WBC NRBC COR # BLD: 8.65 10*3/MM3 (ref 3.4–10.8)

## 2020-02-12 PROCEDURE — 83540 ASSAY OF IRON: CPT | Performed by: INTERNAL MEDICINE

## 2020-02-12 PROCEDURE — G0463 HOSPITAL OUTPT CLINIC VISIT: HCPCS | Performed by: INTERNAL MEDICINE

## 2020-02-12 PROCEDURE — 85025 COMPLETE CBC W/AUTO DIFF WBC: CPT | Performed by: INTERNAL MEDICINE

## 2020-02-12 PROCEDURE — G9903 PT SCRN TBCO ID AS NON USER: HCPCS | Performed by: INTERNAL MEDICINE

## 2020-02-12 PROCEDURE — 84466 ASSAY OF TRANSFERRIN: CPT | Performed by: INTERNAL MEDICINE

## 2020-02-12 PROCEDURE — 82728 ASSAY OF FERRITIN: CPT | Performed by: INTERNAL MEDICINE

## 2020-02-12 PROCEDURE — 82746 ASSAY OF FOLIC ACID SERUM: CPT | Performed by: INTERNAL MEDICINE

## 2020-02-12 PROCEDURE — 1157F ADVNC CARE PLAN IN RCRD: CPT | Performed by: INTERNAL MEDICINE

## 2020-02-12 PROCEDURE — G8730 PAIN DOC POS AND PLAN: HCPCS | Performed by: INTERNAL MEDICINE

## 2020-02-12 PROCEDURE — 82607 VITAMIN B-12: CPT | Performed by: INTERNAL MEDICINE

## 2020-02-12 PROCEDURE — 99214 OFFICE O/P EST MOD 30 MIN: CPT | Performed by: INTERNAL MEDICINE

## 2020-02-12 NOTE — PROGRESS NOTES
DATE OF VISIT: 2/12/2020      REASON FOR VISIT:  Mechanical mitral valve on Coumadin, problem having Coumadin level in the therapeutic range ,Anemia, thrombocytosis      HISTORY OF PRESENT ILLNESS:    39-year-old female with a past medical history significant for open heart surgery with mitral valve replacement with mechanical valve in July 2015, history of AICD placement of week later after open-heart surgery secondary to tachycardia, congestive heart failure has been on Coumadin since July 2015 secondary to mechanical mitral valve.  Patient was initially seen in consultation on September 12, 2017 for evaluation of problem with Coumadin not being in therapeutic range on a persistent basis.  Subsequently patient was referred to Coumadin clinic for monitoring of INR.   Due to recurrent TIA in subtherapeutic INR in between, patient was referred to [facility] where she was evaluated by Dr. Cabrera on July 9, 2018.  States she had another episode of TIA on August 18, 2018 where she was evaluated at [facility].  Denies any more TIA episodes since August 2018. Patient is here for follow up appointment today. States her fatigue is improved.  Complains of intermittent vaginal bleeding for which she is scheduled to get seen by a gynecologist in [place].  Denies any other bleeding.        PAST MEDICAL HISTORY:    Past Medical History:   Diagnosis Date   • AICD (automatic cardioverter/defibrillator) present    • Anticoagulation goal of INR 2.5 to 3.5    • Asthma    • Atrial fibrillation (CMS/HCC)    • Cardiomyopathy (CMS/HCC)    • Congestive heart failure (CMS/HCC)    • Diabetes mellitus (CMS/HCC)    • Disease of thyroid gland     hypothyroidism    • Enlarged heart    • GERD (gastroesophageal reflux disease)    • Hx of mitral valve replacement with mechanical valve     with thrombus 7/2017   • Hyperlipidemia    • Migraine    • MRSA (methicillin resistant Staphylococcus aureus) 2015    boil   • PONV  (postoperative nausea and vomiting)    • Sleep apnea    • TIA (transient ischemic attack)        SOCIAL HISTORY:    Social History     Tobacco Use   • Smoking status: Former Smoker   • Smokeless tobacco: Never Used   Substance Use Topics   • Alcohol use: No   • Drug use: No       Surgical History :  Past Surgical History:   Procedure Laterality Date   • A-V CARDIAC PACEMAKER INSERTION     • APPENDECTOMY     • ATRIAL CARDIAC PACEMAKER INSERTION     • CARDIAC CATHETERIZATION     • CARDIAC SURGERY     • CARDIAC VALVE SURGERY     • CYSTOSCOPY  09/23/2015   • HYSTERECTOMY     • TONSILLECTOMY     • TRANSESOPHAGEAL ECHOCARDIOGRAM (CABRERA)     • VENTRAL/INCISIONAL HERNIA REPAIR N/A 7/12/2019    Procedure: Open Ventral Hernia Repair with mesh;  Surgeon: Tuan Hicks MD;  Location: [facility];  Service: General       ALLERGIES:    Allergies   Allergen Reactions   • Hydrocodone Nausea And Vomiting   • Hydrocodone Bitartrate Er GI Intolerance         FAMILY HISTORY:  Family History   Problem Relation Age of Onset   • Diabetes Father    • Hypertension Father    • Diabetes Maternal Grandmother    • Kidney disease Maternal Grandmother    • Hypertension Maternal Grandmother    • Heart disease Maternal Grandmother    • Stroke Maternal Grandmother    • Thyroid disease Maternal Grandmother    • Cancer Maternal Grandfather    • Kidney disease Paternal Grandmother    • Diabetes Paternal Grandfather            REVIEW OF SYSTEMS:      CONSTITUTIONAL:  States her fatigue is improved. No recent weight change. Denies any fever, chills or weight loss.      HEENT:  No epistaxis, mouth sores or difficulty swallowing.     RESPIRATORY:  No new shortness of breath. No new cough or hemoptysis.     CARDIOVASCULAR:  Complains of chest pain with exertion.  No palpitation.     GASTROINTESTINAL: No abdominal pain, nausea, vomiting or blood in the stool.     GENITOURINARY:Complains of intermittent vaginal spotting/bleeding.    No Dysuria or  "Hematuria.     MUSCULOSKELETAL:  No new back pain or arthralgia..     LYMPHATICS:  Denies any abnormal swollen glands anywhere in the body.     NEUROLOGICAL : Had seizure with most recent episode of TIA in August 2018.  Complains of tingling and numbness affecting the right thigh since episode of hemiparesis in July 2017.  No new headaches or dizziness.   No  balance problems.     SKIN:  Denies any new skin rash.        PHYSICAL EXAMINATION:      VITAL SIGNS:  /62   Pulse 64   Temp 97.5 °F (36.4 °C) (Temporal)   Resp 18   Ht 167.6 cm (65.98\")   Wt 101 kg (223 lb 9.6 oz)   LMP 04/30/2015 (Within Months) Comment: Partial Hysterectomy  BMI 36.11 kg/m²       02/12/20  1042   Weight: 101 kg (223 lb 9.6 oz)     ECOG performance status: 2    GENERAL:  Not in any distress.Obese female.    HEENT:  Normocephalic, Atraumatic.Mild Conjunctival pallor. No icterus. Extraocular Movements Intact. No Facial Asymmetry noted.    NECK:  No adenopathy. No JVD.  Trachea in midline.    RESPIRATORY:  Fair air entry bilateral. No rhonchi or wheezing.    CARDIOVASCULAR:  S1, S2. Regular rate and rhythm. No murmur or gallop appreciated.  Prosthetic valve sound heard.    ABDOMEN:  Soft, obese, nontender. Bowel sounds present in all four quadrants.  No hepatosplenomegaly appreciated.  Healed surgical scar present in midline.    MUSCULOSKELTAL:  No edema.No Calf Tenderness.  Decreased range of motion.    NEUROLOGIC:  Alert, awake and oriented ×3.  No  Motor  deficit appreciated.     SKIN: No new skin lesions.skin is warm and moist.    LYMPHATICS: No new lymph node enlargement in neck or supraclavicular area.            DIAGNOSTIC DATA:    Glucose   Date Value Ref Range Status   07/13/2019 116 (H) 65 - 99 mg/dL Final     Sodium   Date Value Ref Range Status   07/13/2019 140 136 - 145 mmol/L Final   10/15/2018 139 137 - 145 mmol/L Final     Potassium   Date Value Ref Range Status   11/27/2019 4.0 3.3 - 5.0 mmol/L Final     CO2   Date " Value Ref Range Status   07/13/2019 24.0 22.0 - 29.0 mmol/L Final     Total CO2   Date Value Ref Range Status   10/15/2018 27 22 - 30 mmol/L Final   08/21/2018 25 20 - 31 mmol/L Final     Chloride   Date Value Ref Range Status   07/13/2019 106 98 - 107 mmol/L Final   10/15/2018 105 98 - 107 mmol/L Final     Anion Gap   Date Value Ref Range Status   07/13/2019 10.0 5.0 - 15.0 mmol/L Final   08/21/2018 13 4 - 16 mmol/L Final     Creatinine   Date Value Ref Range Status   07/13/2019 0.94 0.57 - 1.00 mg/dL Final   10/15/2018 0.8 0.7 - 1.5 mg/dL Final   08/21/2018 0.9 0.6 - 1.3 mg/dL Final     BUN   Date Value Ref Range Status   07/13/2019 9 6 - 20 mg/dL Final   10/15/2018 14 7 - 20 mg/dL Final     BUN/Creatinine Ratio   Date Value Ref Range Status   07/13/2019 9.6 7.0 - 25.0 Final   10/15/2018 17.1 RATIO Final     Calcium   Date Value Ref Range Status   07/13/2019 8.5 (L) 8.6 - 10.5 mg/dL Final   10/15/2018 8.9 8.4 - 10.2 mg/dL Final     eGFR Non  Amer   Date Value Ref Range Status   07/13/2019 66 >60 mL/min/1.73 Final     Alkaline Phosphatase   Date Value Ref Range Status   10/15/2018 63 38 - 126 U/L Final     Total Protein   Date Value Ref Range Status   10/15/2018 6.6 6.3 - 8.2 g/dL Final     ALT (SGPT)   Date Value Ref Range Status   10/15/2018 50 13 - 69 U/L Final     AST (SGOT)   Date Value Ref Range Status   10/15/2018 38 15 - 46 U/L Final     Total Bilirubin   Date Value Ref Range Status   10/15/2018 0.3 0.2 - 1.3 mg/dL Final     Albumin   Date Value Ref Range Status   10/15/2018 3.8 3.5 - 5.0 g/dL Final     Globulin   Date Value Ref Range Status   10/15/2018 2.8 1.5 - 4.5 g/dL Final     A/G Ratio   Date Value Ref Range Status   10/15/2018 1.4 1.1 - 2.5 RATIO Final     Lab Results   Component Value Date    WBC 8.65 02/12/2020    HGB 12.6 02/12/2020    HCT 38.7 02/12/2020    MCV 85.6 02/12/2020     02/12/2020     Lab Results   Component Value Date    NEUTROABS 5.89 02/12/2020    IRON 88 02/12/2020     TIBC 414 02/12/2020    LABIRON 21 02/12/2020    FERRITIN 72.34 02/12/2020    LXFVMAQV16 1,037 (H) 10/29/2019    FOLATE >20.00 10/29/2019         Anti cardiolipin profile (SO)4/21/2018  [facility]  Component Name Value Ref Range   ANC IGG <9   Comment:  (NOTE)                           Negative:              <15                           Indeterminate:     15 - 20                           Low-Med Positive: >20 - 80                           High Positive:         >80 0 - 14 GPL/mL    ANC IGM 10   Comment:  (NOTE)                           Negative:              <13                           Indeterminate:     13 - 20                           Low-Med Positive: >20 - 80                           High Positive:         >80 0 - 12 MPL/mL    ANC IGA <9   Comment:  (NOTE)                           Negative:              <12                           Indeterminate:     12 - 20                           Low-Med Positive: >20 - 80                           High Positive:         >80  Performed At: [address] [id number]  Kanika Tarango PhD Ph:[phone]  Test performed at [address] [place] [address]          Antithrombin III (ATIII) (SO)4/21/2018  [facility]  Component Name Value Ref Range   AT3 95   Comment:  (NOTE)  Direct Xa inhibitor anticoagulants such as rivaroxaban, apixaban and  edoxaban will lead to spuriously elevated antithrombin activity  levels possibly masking a deficiency.  Performed At: [address] [id number]  Savage COHEN MD Ph:[phone]  Test performed at [address] [place] [address] 75 - 135 %   Specimen   Citrated Plasma       FACTOR 5 (V) [place] (SO)4/21/2018  [facility]  Component Name Value Ref Range   RESULTS Comment   Comment:  (NOTE)  Result:  Negative (no mutation found)        Lupus inhibitor screen (SO)4/21/2018  [facility]  Component Name Value Ref Range   PTT-LA 43.6 0.0 -  51.9 s   DRVVT 55.3 (H) 0.0 - 47.0 s   LUPUS REFLEX INTERP Comment:   Comment:  (NOTE)  No lupus anticoagulant was detected. An extended dRVVT that corrects  on mixing with normal plasma can be caused by a deficiency of one of  the common pathway factors (X, V, II or fibrinogen).  Performed At: [facility]  [address], [place] [id number]  Savage COHEN MD Ph:[phone]  Test performed at [address] [facility]     Specimen         Protein C activity (SO)4/21/2018  [facility]  Component Name Value Ref Range   PROTEIN C ACTIVITY 43 (L)   Comment:        Protein S activity (SO)4/21/2018  [facility]  Component Name Value Ref Range   PROTEIN S ACTIVITY 40 (L)   Comment:  (NOTE)        Prothrombin 20210 (SO)4/21/2018  [facility]  Component Name Value Ref Range   RESULTS Comment   Comment:  (NOTE)  NEGATIVE  No mutation identified.            Most recent INR done on August 2, 2019 is 2.8        RADIOLOGY DATA:   CT angiogram of the head done at [facility] on April 21, 2018 showed:  Result Impression        Anatomic variants of the [place] as above. No radiographic evidence of vascular occlusion, hemodynamically significant stenosis, or aneurysm are identified.       CT Angiogram of the neck done at [facility] on April 21, 2018 showed:  Result Impression       No hemodynamically significant stenosis identified           Assessment and plan:    1.  Problems with getting therapeutic INR range with Coumadin: Patient states she has been Coumadin since July 2015 secondary to her mechanical mitral valve.    -And patient was here at Coumadin clinic, her INR was never staging therapeutic despite of trying different dose of Coumadin.  -Patient is currently being anticoagulated with Coumadin and intermittent Lovenox under care of Dr. Chandler.    -Most recent INR  on February 5 2020 was 2.8.  Required Lovenox injection 1 time last week secondary to INR of  1.8.  -Patient was evaluated by Dr. Cabrera in [place] for second opinion on July 9, 2018.  He recommend continuing with Coumadin with intermittent Lovenox if required.  -We will ask patient to return to clinic in 3 months with a repeat CBC and anemia workup on that day.    2.  Recurrent TIA:  -Patient is having episodes of TIA without any deficit at present.  -Recent CT angiogram of the head as well as CT angiogram of neck does not show any evidence of blockage.  -Hypercoagulable workup done at [facility] in April 2018 is negative except for low protein C and low protein S level.  But patient was taking Coumadin on that point and that can lower protein C and protein S.  Result of hypercoagulable workup were discussed with patient on June 14, 2018.    3.  Anemia:  -Hemoglobin is 12.6 today  -Patient is currently taking ferrous sulfate 1 tablet p.o. daily along with B12 and folic acid 3 times a week.      4.  Thrombocytosis  -resolved.Platelet count is 407,000.    5.  Intracranial aneurysm: Patient was found to have aneurysm which was incidental finding.   Currently being followed by neurosurgeon, no other intervention is planned at this point.     6.  Health maintenance: Patient does not smoke currently.  She is only 39 years of age and not due for colonoscopy at this point.      7. Advance care planning: Patient has advanced directive on file.    8. BMI: Patient's Body mass index is 36.11 kg/m². BMI is higher than reference range.  Patient was notified about her elevated BMI.    9.  Frances Motta reports a pain score of 5.  Given her pain assessment as noted, treatment options were discussed and the following options were decided upon as a follow-up plan to address the patient's pain: Recommend following up with primary medical provider for chronic pain management.    10. PHQ-9 Total Score: 13   -Her high PHQ 9 score is related to chronic medical problem and symptoms from it.  Patient is not  homicidal or suicidal.  No acute intervention required.          Remington Welch MD  2/12/2020  11:50 AM        EMR Dragon/Transcription disclaimer:   Much of this encounter note is an electronic transcription/translation of spoken language to printed text. The electronic translation of spoken language may permit erroneous, or at times, nonsensical words or phrases to be inadvertently transcribed; Although I have reviewed the note for such errors, some may still exist.

## 2020-02-13 ENCOUNTER — TELEPHONE (OUTPATIENT)
Dept: ONCOLOGY | Facility: HOSPITAL | Age: 40
End: 2020-02-13

## 2020-02-13 NOTE — TELEPHONE ENCOUNTER
----- Message from Remington Welch MD sent at 2/13/2020  7:25 AM CST -----  Please let patient know, she needs to continue taking ferrous sulfate 1 tablet p.o. daily along with B12 and folic acid 3 times a week.  Thank you

## 2020-03-31 RX ORDER — FOLIC ACID 1 MG/1
1 TABLET ORAL DAILY
Qty: 90 TABLET | Refills: 1 | Status: SHIPPED | OUTPATIENT
Start: 2020-03-31

## 2020-03-31 RX ORDER — FERROUS SULFATE 325(65) MG
325 TABLET ORAL
Qty: 30 TABLET | Refills: 3 | Status: SHIPPED | OUTPATIENT
Start: 2020-03-31

## 2020-05-13 ENCOUNTER — APPOINTMENT (OUTPATIENT)
Dept: ONCOLOGY | Facility: HOSPITAL | Age: 40
End: 2020-05-13

## 2021-11-30 ENCOUNTER — TRANSCRIBE ORDERS (OUTPATIENT)
Dept: LAB | Facility: OTHER | Age: 41
End: 2021-11-30

## 2021-11-30 ENCOUNTER — LAB (OUTPATIENT)
Dept: LAB | Facility: OTHER | Age: 41
End: 2021-11-30

## 2021-11-30 DIAGNOSIS — D64.9 ANEMIA, UNSPECIFIED TYPE: ICD-10-CM

## 2021-11-30 DIAGNOSIS — E55.9 VITAMIN D DEFICIENCY: ICD-10-CM

## 2021-11-30 DIAGNOSIS — E55.9 VITAMIN D DEFICIENCY: Primary | ICD-10-CM

## 2021-11-30 PROCEDURE — 36415 COLL VENOUS BLD VENIPUNCTURE: CPT | Performed by: INTERNAL MEDICINE

## 2021-11-30 PROCEDURE — 82746 ASSAY OF FOLIC ACID SERUM: CPT | Performed by: INTERNAL MEDICINE

## 2021-11-30 PROCEDURE — 82306 VITAMIN D 25 HYDROXY: CPT | Performed by: INTERNAL MEDICINE

## 2021-11-30 PROCEDURE — 83540 ASSAY OF IRON: CPT | Performed by: INTERNAL MEDICINE

## 2021-11-30 PROCEDURE — 82607 VITAMIN B-12: CPT | Performed by: INTERNAL MEDICINE

## 2021-12-01 LAB
25(OH)D3 SERPL-MCNC: 41.6 NG/ML (ref 30–100)
FOLATE SERPL-MCNC: 18.8 NG/ML (ref 4.78–24.2)
IRON 24H UR-MRATE: 64 MCG/DL (ref 37–145)
VIT B12 BLD-MCNC: 557 PG/ML (ref 211–946)

## 2022-08-11 ENCOUNTER — ANESTHESIA (OUTPATIENT)
Dept: GASTROENTEROLOGY | Facility: HOSPITAL | Age: 42
End: 2022-08-11

## 2022-08-11 ENCOUNTER — HOSPITAL ENCOUNTER (OUTPATIENT)
Facility: HOSPITAL | Age: 42
Setting detail: HOSPITAL OUTPATIENT SURGERY
Discharge: HOME | End: 2022-08-11
Attending: INTERNAL MEDICINE | Admitting: INTERNAL MEDICINE

## 2022-08-11 ENCOUNTER — ANESTHESIA EVENT (OUTPATIENT)
Dept: GASTROENTEROLOGY | Facility: HOSPITAL | Age: 42
End: 2022-08-11

## 2022-08-11 VITALS
HEART RATE: 65 BPM | SYSTOLIC BLOOD PRESSURE: 100 MMHG | TEMPERATURE: 97.4 F | OXYGEN SATURATION: 98 % | BODY MASS INDEX: 38.27 KG/M2 | DIASTOLIC BLOOD PRESSURE: 53 MMHG | RESPIRATION RATE: 18 BRPM | HEIGHT: 63 IN | WEIGHT: 216 LBS

## 2022-08-11 DIAGNOSIS — R10.13 DYSPEPSIA: ICD-10-CM

## 2022-08-11 DIAGNOSIS — R19.7 DIARRHEA: ICD-10-CM

## 2022-08-11 PROCEDURE — 25010000002 FENTANYL CITRATE (PF) 50 MCG/ML SOLUTION: Performed by: NURSE ANESTHETIST, CERTIFIED REGISTERED

## 2022-08-11 PROCEDURE — 25010000002 MIDAZOLAM PER 1 MG: Performed by: NURSE ANESTHETIST, CERTIFIED REGISTERED

## 2022-08-11 PROCEDURE — 87071 CULTURE AEROBIC QUANT OTHER: CPT | Performed by: INTERNAL MEDICINE

## 2022-08-11 PROCEDURE — 25010000002 PROPOFOL 10 MG/ML EMULSION: Performed by: NURSE ANESTHETIST, CERTIFIED REGISTERED

## 2022-08-11 RX ORDER — PROPOFOL 10 MG/ML
VIAL (ML) INTRAVENOUS AS NEEDED
Status: DISCONTINUED | OUTPATIENT
Start: 2022-08-11 | End: 2022-08-11 | Stop reason: SURG

## 2022-08-11 RX ORDER — MIDAZOLAM HYDROCHLORIDE 1 MG/ML
INJECTION INTRAMUSCULAR; INTRAVENOUS AS NEEDED
Status: DISCONTINUED | OUTPATIENT
Start: 2022-08-11 | End: 2022-08-11 | Stop reason: SURG

## 2022-08-11 RX ORDER — LIDOCAINE HYDROCHLORIDE 20 MG/ML
INJECTION, SOLUTION INTRAVENOUS AS NEEDED
Status: DISCONTINUED | OUTPATIENT
Start: 2022-08-11 | End: 2022-08-11 | Stop reason: SURG

## 2022-08-11 RX ORDER — DEXTROSE AND SODIUM CHLORIDE 5; .45 G/100ML; G/100ML
30 INJECTION, SOLUTION INTRAVENOUS CONTINUOUS PRN
Status: DISCONTINUED | OUTPATIENT
Start: 2022-08-11 | End: 2022-08-11 | Stop reason: HOSPADM

## 2022-08-11 RX ORDER — FENTANYL CITRATE 50 UG/ML
INJECTION, SOLUTION INTRAMUSCULAR; INTRAVENOUS AS NEEDED
Status: DISCONTINUED | OUTPATIENT
Start: 2022-08-11 | End: 2022-08-11 | Stop reason: SURG

## 2022-08-11 RX ADMIN — PROPOFOL 30 MG: 10 INJECTION, EMULSION INTRAVENOUS at 10:02

## 2022-08-11 RX ADMIN — DEXTROSE AND SODIUM CHLORIDE 30 ML/HR: 5; 450 INJECTION, SOLUTION INTRAVENOUS at 09:22

## 2022-08-11 RX ADMIN — PROPOFOL 50 MG: 10 INJECTION, EMULSION INTRAVENOUS at 10:11

## 2022-08-11 RX ADMIN — LIDOCAINE HYDROCHLORIDE 100 MG: 20 INJECTION, SOLUTION INTRAVENOUS at 09:59

## 2022-08-11 RX ADMIN — FENTANYL CITRATE 100 MCG: 50 INJECTION INTRAMUSCULAR; INTRAVENOUS at 09:59

## 2022-08-11 RX ADMIN — MIDAZOLAM HYDROCHLORIDE 2 MG: 1 INJECTION, SOLUTION INTRAMUSCULAR; INTRAVENOUS at 09:59

## 2022-08-11 RX ADMIN — PROPOFOL 50 MG: 10 INJECTION, EMULSION INTRAVENOUS at 10:06

## 2022-08-11 NOTE — ANESTHESIA PREPROCEDURE EVALUATION
Anesthesia Evaluation     Patient summary reviewed   history of anesthetic complications: PONV  NPO Solid Status: > 8 hours  NPO Liquid Status: > 8 hours           Airway   Mallampati: II  TM distance: >3 FB  Neck ROM: full  Large neck circumference  Dental    (+) poor dentition    Pulmonary - normal exam   (+) asthma,sleep apnea,   Cardiovascular - normal exam  Exercise tolerance: good (4-7 METS)    NYHA Classification: II  ECG reviewed  PT is on anticoagulation therapy  Patient on routine beta blocker    (+) hypertension, dysrhythmias Atrial Fib, CHF , hyperlipidemia,     ROS comment: S/p mitral valve replacement [facility] in the past for IHSS; off coumadin, now on sq lovenox.    Neuro/Psych  (+) TIA, headaches, psychiatric history Anxiety,    GI/Hepatic/Renal/Endo    (+) obesity, morbid obesity, GERD,  diabetes mellitus,     Musculoskeletal     (+) neck pain,   Abdominal    Substance History      OB/GYN          Other   arthritis,                        Anesthesia Plan    ASA 3     MAC     intravenous induction     Anesthetic plan, risks, benefits, and alternatives have been provided, discussed and informed consent has been obtained with: patient.

## 2022-08-11 NOTE — DISCHARGE INSTR - APPOINTMENTS
Dr. Mihai Kern, DO  [address]  [place] [address]  [phone]    Appointment date and time:     September 21, 2022  @  8:15 am

## 2022-08-11 NOTE — H&P
Fuad Loya DO,[facility]  Gastroenterology  Hepatology  Endoscopy  Board Certified in Internal Medicine and gastroenterology  [address], [address]  [place]. [address]  - [phone]   F - [phone]     GASTROENTEROLOGY HISTORY AND PHYSICAL  NOTE   FUAD LOYA DO.         SUBJECTIVE:   8/11/2022    Name: Frances Motta  DOD: 1980        Chief Complaint:     Subjective : Dyspepsia.  Changes in bowel habits, diarrhea    Patient is 42 y.o. female presents with desire for elective EGD with biopsy and culture as well as colonoscopy with biopsy.      ROS/HISTORY/ CURRENT MEDICATIONS/OBJECTIVE/VS/PE:   Review of Systems:  All systems unremarkable unless specified below.  Constitutional   HENT  Eyes   Respiratory    Cardiovascular  Gastrointestinal   Endocrine  Genitourinary    Musculoskeletal   Skin  Allergic/Immunologic    Neurological    Hematological  Psychiatric/Behavioral    History:     Past Medical History:   Diagnosis Date   • AICD (automatic cardioverter/defibrillator) present    • Anticoagulation goal of INR 2.5 to 3.5    • Asthma    • Atrial fibrillation (HCC)    • Cardiomyopathy (HCC)    • Congestive heart failure (HCC)    • Diabetes mellitus (HCC)    • Disease of thyroid gland     hypothyroidism    • Enlarged heart    • GERD (gastroesophageal reflux disease)    • Hx of mitral valve replacement with mechanical valve     with thrombus 7/2017   • Hyperlipidemia    • Migraine    • MRSA (methicillin resistant Staphylococcus aureus) 2015    boil   • PONV (postoperative nausea and vomiting)    • Sleep apnea    • TIA (transient ischemic attack)      Past Surgical History:   Procedure Laterality Date   • A-V CARDIAC PACEMAKER INSERTION     • APPENDECTOMY     • ATRIAL CARDIAC PACEMAKER INSERTION     • CARDIAC CATHETERIZATION     • CARDIAC SURGERY     • CARDIAC VALVE SURGERY     • CYSTOSCOPY  09/23/2015   • HYSTERECTOMY     • TONSILLECTOMY     • TRANSESOPHAGEAL ECHOCARDIOGRAM (CABRERA)      • VENTRAL/INCISIONAL HERNIA REPAIR N/A 7/12/2019    Procedure: Open Ventral Hernia Repair with mesh;  Surgeon: Tuan Hicks MD;  Location: [facility];  Service: General     Family History   Problem Relation Age of Onset   • Diabetes Father    • Hypertension Father    • Diabetes Maternal Grandmother    • Kidney disease Maternal Grandmother    • Hypertension Maternal Grandmother    • Heart disease Maternal Grandmother    • Stroke Maternal Grandmother    • Thyroid disease Maternal Grandmother    • Cancer Maternal Grandfather    • Kidney disease Paternal Grandmother    • Diabetes Paternal Grandfather      Social History     Tobacco Use   • Smoking status: Former Smoker   • Smokeless tobacco: Never Used   Substance Use Topics   • Alcohol use: No   • Drug use: No     Prior to Admission medications    Medication Sig Start Date End Date Taking? Authorizing Provider   atorvastatin (LIPITOR) 80 MG tablet Take 80 mg by mouth Every Night.   Yes Caleb Hunter MD   Enoxaparin Sodium (LOVENOX SC) Inject  under the skin into the appropriate area as directed. As directed prior to surgery   Yes Caleb Hunter MD   fenofibrate 160 MG tablet Take 160 mg by mouth Every Night.   Yes Caleb Hunter MD   glyBURIDE (DIAbeta) 5 MG tablet Take 5 mg by mouth 2 (Two) Times a Day With Meals.   Yes Caleb Hunter MD   insulin glargine (LANTUS) 100 UNIT/ML injection Inject 30 Units under the skin into the appropriate area as directed Every Night.   Yes Caleb Hunter MD   levothyroxine (SYNTHROID, LEVOTHROID) 50 MCG tablet Take 1 tablet by mouth Daily. 6/1/17  Yes Casandra Pedersen DO   metoprolol tartrate (LOPRESSOR) 25 MG tablet Take 0.5 tablets by mouth 2 (Two) Times a Day. 8/30/17  Yes Wallace Haro MD   ondansetron (ZOFRAN) 4 MG tablet Take 4 mg by mouth Every 8 (Eight) Hours As Needed.   Yes Caleb Hunter MD   pantoprazole (PROTONIX) 40 MG EC tablet Take 1 tablet by mouth  Daily. 5/19/17  Yes Casandra Pedersen DO   sertraline (ZOLOFT) 100 MG tablet Take 100 mg by mouth Daily.   Yes Caleb Hunter MD   aspirin 81 MG EC tablet Take 81 mg by mouth Every Night.    Caleb Hunter MD   docusate sodium (COLACE) 100 MG capsule Take 100 mg by mouth 2 (Two) Times a Day.    Caleb Hunter MD   ferrous sulfate 325 (65 FE) MG tablet Take 1 tablet by mouth Daily With Breakfast. 3/31/20   Remington Welch MD   folic acid (FOLVITE) 1 MG tablet Take 1 tablet by mouth Daily. 3/31/20   Remington Welch MD   magnesium oxide (MAGOX) 400 (241.3 Mg) MG tablet tablet Take 400 mg by mouth Daily.    Caleb Hunter MD   oxyCODONE-acetaminophen (PERCOCET) 5-325 MG per tablet Take 1 tablet by mouth Every 4 (Four) Hours As Needed (Pain). 7/13/19   Tuan Hicks MD   SITagliptin (JANUVIA) 100 MG tablet Take 100 mg by mouth Daily.    Caleb Hunter MD   vitamin B-12 (CYANOCOBALAMIN) 1000 MCG tablet Take 1 tablet by mouth Daily. 8/8/19   Remington Welch MD   warfarin (COUMADIN) 10 MG tablet Take 10 mg by mouth.    Caleb Hunter MD   warfarin (COUMADIN) 5 MG tablet Take 5 mg by mouth Every Night. As directed    Caleb Hunter MD   warfarin (COUMADIN) 7.5 MG tablet Take 7.5 mg by mouth.    Caleb Hunter MD     Allergies:  Hydrocodone and Hydrocodone bitartrate er    I have reviewed the patients medical history, surgical history and family history in the available medical record system.     Current Medications:     Current Facility-Administered Medications   Medication Dose Route Frequency Provider Last Rate Last Admin   • dextrose 5 % and sodium chloride 0.45 % infusion  30 mL/hr Intravenous Continuous PRN Mihai Kern DO 30 mL/hr at 08/11/22 0922 30 mL/hr at 08/11/22 0922       Objective     Physical Exam:   Temp:  [96.7 °F (35.9 °C)] 96.7 °F (35.9 °C)  Heart Rate:  [74] 74  Resp:  [18] 18  BP: (105)/(67) 105/67    Physical Exam:  General Appearance:     Alert, cooperative, in no acute distress   Head:    Normocephalic, without obvious abnormality, atraumatic   Eyes:            Lids and lashes normal, conjunctivae and sclerae normal, no icterus, no pallor, corneas clear, PERRLA   Ears:    Ears appear intact with no abnormalities noted   Throat:   No oral lesions, no thrush, oral mucosa moist   Neck:   No adenopathy, supple, trachea midline, no thyromegaly, no  carotid bruit, no JVD   Back:     No kyphosis present, no scoliosis present, no skin lesions,   erythema or scars, no tenderness to percussion or                 palpation,  range of motion normal   Lungs:     Clear to auscultation,respirations regular, even and         unlabored    Heart:    Regular rhythm and normal rate, normal S1 and S2, no  murmur, no gallop, no rub, no click   Breast Exam:    Deferred   Abdomen:     Normal bowel sounds, no masses, no organomegaly, soft  nontender, nondistended, no guarding, no rebound                 tenderness   Genitalia:    Deferred   Extremities:   Moves all extremities well, no edema, no cyanosis, no          redness   Pulses:   Pulses palpable and equal bilaterally   Skin:   No bleeding, bruising or rash   Lymph nodes:   No palpable adenopathy   Neurologic:   Cranial nerves 2 - 12 grossly intact, sensation intact, DTR     present and equal bilaterally      Results Review:     Lab Results   Component Value Date    WBC 8.65 02/12/2020    WBC 8.3 10/29/2019    WBC 8.37 10/29/2019    HGB 12.6 02/12/2020    HGB 11.9 (L) 10/29/2019    HGB 11.9 (L) 10/29/2019    HCT 38.7 02/12/2020    HCT 38 10/29/2019    HCT 37.3 10/29/2019     02/12/2020     10/29/2019     10/29/2019             No results found for: LIPASE  Lab Results   Component Value Date    INR 1.07 07/15/2022    INR 1.76 (H) 07/12/2022    INR 1.91 (H) 07/11/2022     No results found for: CULTURE    Radiology Review:  Imaging Results (Last 72 Hours)     ** No results found for the last 72  hours. **           I reviewed the patient's new clinical results.  I reviewed the patient's new imaging results and agree with the interpretation.     ASSESSMENT/PLAN:   ASSESSMENT:  1.  Dyspepsia  2.  Changes in bowel habits    PLAN:  1.  Esophagogastroduodenoscopy with biopsy and culture  2.  Colonoscopy with biopsy    Risk and benefits associated with the procedure are reviewed with the patient.  The patient wished to proceed     Mihai Kern DO  08/11/22  09:56 CDT

## 2022-08-11 NOTE — ANESTHESIA POSTPROCEDURE EVALUATION
Patient: Frances Motta    Procedure Summary     Date: 08/11/22 Room / Location: [facility] ENDOSCOPY 2 / [facility] ENDOSCOPY    Anesthesia Start: 0958 Anesthesia Stop: 1022    Procedures:       ESOPHAGOGASTRODUODENOSCOPY (N/A )      COLONOSCOPY (N/A ) Diagnosis:     Surgeons: Mihai Kern DO Provider: Fiona Calabrese CRNA    Anesthesia Type: MAC ASA Status: 3          Anesthesia Type: MAC    Vitals  No vitals data found for the desired time range.          Post Anesthesia Care and Evaluation    Patient location during evaluation: PHASE II  Patient participation: waiting for patient participation  Level of consciousness: awake and alert  Pain score: 0  Pain management: adequate    Airway patency: patent  Anesthetic complications: No anesthetic complications  PONV Status: none  Cardiovascular status: acceptable  Respiratory status: acceptable  Hydration status: acceptable

## 2022-08-12 LAB — REF LAB TEST METHOD: NORMAL

## 2022-08-13 LAB — BACTERIA SPEC AEROBE CULT: ABNORMAL

## 2023-09-12 ENCOUNTER — PREP FOR SURGERY (OUTPATIENT)
Dept: OTHER | Facility: HOSPITAL | Age: 43
End: 2023-09-12
Payer: COMMERCIAL

## 2023-09-12 DIAGNOSIS — K22.70 BARRETT'S ESOPHAGUS: Primary | ICD-10-CM

## 2023-09-12 RX ORDER — DEXTROSE AND SODIUM CHLORIDE 5; .45 G/100ML; G/100ML
30 INJECTION, SOLUTION INTRAVENOUS CONTINUOUS PRN
OUTPATIENT
Start: 2023-09-19

## 2023-09-13 PROBLEM — K22.70 BARRETT'S ESOPHAGUS: Status: ACTIVE | Noted: 2023-09-13

## 2023-09-15 RX ORDER — TRAZODONE HYDROCHLORIDE 100 MG/1
100 TABLET ORAL NIGHTLY
COMMUNITY

## 2023-09-15 RX ORDER — LIRAGLUTIDE 6 MG/ML
1.8 INJECTION SUBCUTANEOUS DAILY
COMMUNITY

## 2023-09-19 ENCOUNTER — HOSPITAL ENCOUNTER (OUTPATIENT)
Facility: HOSPITAL | Age: 43
Setting detail: HOSPITAL OUTPATIENT SURGERY
Discharge: HOME | End: 2023-09-19
Attending: INTERNAL MEDICINE | Admitting: INTERNAL MEDICINE
Payer: COMMERCIAL

## 2023-09-19 ENCOUNTER — ANESTHESIA (OUTPATIENT)
Dept: GASTROENTEROLOGY | Facility: HOSPITAL | Age: 43
End: 2023-09-19
Payer: COMMERCIAL

## 2023-09-19 ENCOUNTER — ANESTHESIA EVENT (OUTPATIENT)
Dept: GASTROENTEROLOGY | Facility: HOSPITAL | Age: 43
End: 2023-09-19
Payer: COMMERCIAL

## 2023-09-19 VITALS
RESPIRATION RATE: 16 BRPM | TEMPERATURE: 97.3 F | WEIGHT: 200 LBS | HEIGHT: 66 IN | BODY MASS INDEX: 32.14 KG/M2 | HEART RATE: 61 BPM | OXYGEN SATURATION: 99 % | SYSTOLIC BLOOD PRESSURE: 108 MMHG | DIASTOLIC BLOOD PRESSURE: 62 MMHG

## 2023-09-19 DIAGNOSIS — K22.70 BARRETT'S ESOPHAGUS: ICD-10-CM

## 2023-09-19 PROCEDURE — 25010000002 ONDANSETRON PER 1 MG

## 2023-09-19 PROCEDURE — 25010000002 PROPOFOL 10 MG/ML EMULSION

## 2023-09-19 RX ORDER — PROPOFOL 10 MG/ML
VIAL (ML) INTRAVENOUS AS NEEDED
Status: DISCONTINUED | OUTPATIENT
Start: 2023-09-19 | End: 2023-09-19 | Stop reason: SURG

## 2023-09-19 RX ORDER — LIDOCAINE HYDROCHLORIDE 20 MG/ML
INJECTION, SOLUTION EPIDURAL; INFILTRATION; INTRACAUDAL; PERINEURAL AS NEEDED
Status: DISCONTINUED | OUTPATIENT
Start: 2023-09-19 | End: 2023-09-19 | Stop reason: SURG

## 2023-09-19 RX ORDER — DEXTROSE AND SODIUM CHLORIDE 5; .45 G/100ML; G/100ML
30 INJECTION, SOLUTION INTRAVENOUS CONTINUOUS PRN
Status: DISCONTINUED | OUTPATIENT
Start: 2023-09-19 | End: 2023-09-19 | Stop reason: HOSPADM

## 2023-09-19 RX ORDER — ONDANSETRON 2 MG/ML
INJECTION INTRAMUSCULAR; INTRAVENOUS AS NEEDED
Status: DISCONTINUED | OUTPATIENT
Start: 2023-09-19 | End: 2023-09-19 | Stop reason: SURG

## 2023-09-19 RX ADMIN — PROPOFOL 45 MG: 10 INJECTION, EMULSION INTRAVENOUS at 10:18

## 2023-09-19 RX ADMIN — ONDANSETRON 4 MG: 2 INJECTION INTRAMUSCULAR; INTRAVENOUS at 10:15

## 2023-09-19 RX ADMIN — PROPOFOL 70 MG: 10 INJECTION, EMULSION INTRAVENOUS at 10:17

## 2023-09-19 RX ADMIN — LIDOCAINE HYDROCHLORIDE 100 MG: 20 INJECTION, SOLUTION EPIDURAL; INFILTRATION; INTRACAUDAL; PERINEURAL at 10:17

## 2023-09-19 RX ADMIN — DEXTROSE AND SODIUM CHLORIDE 30 ML/HR: 5; 450 INJECTION, SOLUTION INTRAVENOUS at 09:41

## 2023-09-19 NOTE — H&P
Fuad Loya DO,[facility]  Gastroenterology  Hepatology  Endoscopy  Board Certified in Internal Medicine and gastroenterology  [address], [address]  [place]. [address]  T- [phone]   F [phone]     GASTROENTEROLOGY HISTORY AND PHYSICAL  NOTE   FUAD LOYA DO.         SUBJECTIVE:   9/19/2023    Name: Frances Motta  DOD: 1980        Chief Complaint:         Subjective: Erickson's esophagus    Patient is 43 y.o. female presents with desire for elective EGD with biopsy.      ROS/HISTORY/ CURRENT MEDICATIONS/OBJECTIVE/VS/PE:   Review of Systems:  All systems unremarkable unless specified below.  Constitutional   HENT  Eyes   Respiratory    Cardiovascular  Gastrointestinal   Endocrine  Genitourinary    Musculoskeletal   Skin  Allergic/Immunologic    Neurological    Hematological  Psychiatric/Behavioral    History:     Past Medical History:   Diagnosis Date    AICD (automatic cardioverter/defibrillator) present     Anticoagulation goal of INR 2.5 to 3.5     Asthma     Atrial fibrillation     Cardiomyopathy     Congestive heart failure     Diabetes mellitus     Disease of thyroid gland     hypothyroidism     Enlarged heart     GERD (gastroesophageal reflux disease)     Hx of mitral valve replacement with mechanical valve     with thrombus 7/2017    Hyperlipidemia     Migraine     MRSA (methicillin resistant Staphylococcus aureus) 2015    boil    PONV (postoperative nausea and vomiting)     Sleep apnea     TIA (transient ischemic attack)      Past Surgical History:   Procedure Laterality Date    A-V CARDIAC PACEMAKER INSERTION      APPENDECTOMY      ATRIAL CARDIAC PACEMAKER INSERTION      CARDIAC CATHETERIZATION      CARDIAC SURGERY      CARDIAC VALVE SURGERY      COLONOSCOPY N/A 8/11/2022    Procedure: COLONOSCOPY;  Surgeon: Fuad Loya DO;  Location: [facility] ENDOSCOPY;  Service: Gastroenterology;  Laterality: N/A;    CYSTOSCOPY  09/23/2015    ENDOSCOPY N/A 8/11/2022     Procedure: ESOPHAGOGASTRODUODENOSCOPY;  Surgeon: Mihai Kern DO;  Location: [facility] ENDOSCOPY;  Service: Gastroenterology;  Laterality: N/A;    HYSTERECTOMY      TONSILLECTOMY      TRANSESOPHAGEAL ECHOCARDIOGRAM (CABRERA)      VENTRAL/INCISIONAL HERNIA REPAIR N/A 7/12/2019    Procedure: Open Ventral Hernia Repair with mesh;  Surgeon: Tuan Hicks MD;  Location: [facility] OR;  Service: General     Family History   Problem Relation Age of Onset    Diabetes Father     Hypertension Father     Diabetes Maternal Grandmother     Kidney disease Maternal Grandmother     Hypertension Maternal Grandmother     Heart disease Maternal Grandmother     Stroke Maternal Grandmother     Thyroid disease Maternal Grandmother     Cancer Maternal Grandfather     Kidney disease Paternal Grandmother     Diabetes Paternal Grandfather      Social History     Tobacco Use    Smoking status: Former     Types: Cigarettes     Quit date: 2013     Years since quitting: 10.7    Smokeless tobacco: Never   Vaping Use    Vaping Use: Never used   Substance Use Topics    Alcohol use: No    Drug use: No     Prior to Admission medications    Medication Sig Start Date End Date Taking? Authorizing Provider   atorvastatin (LIPITOR) 80 MG tablet Take 1 tablet by mouth Every Night.   Yes Caleb Hunter MD   buPROPion HCl (WELLBUTRIN PO) Take 1 tablet by mouth Daily.   Yes Caleb Hunter MD   docusate sodium (COLACE) 100 MG capsule Take 1 capsule by mouth 2 (Two) Times a Day.   Yes Caleb Hunter MD   Enoxaparin Sodium (LOVENOX SC) Inject  under the skin into the appropriate area as directed. As directed prior to surgery   Yes Caleb Hunter MD   fenofibrate 160 MG tablet Take 1 tablet by mouth Every Night.   Yes Caleb Hunter MD   ferrous sulfate 325 (65 FE) MG tablet Take 1 tablet by mouth Daily With Breakfast. 3/31/20  Yes Remington Welch MD   folic acid (FOLVITE) 1 MG tablet Take 1 tablet by mouth Daily. 3/31/20   Yes Remington Welch MD   glyBURIDE (DIAbeta) 5 MG tablet Take 1 tablet by mouth 2 (Two) Times a Day With Meals.   Yes Caleb Hunter MD   insulin glargine (LANTUS) 100 UNIT/ML injection Inject 50 Units under the skin into the appropriate area as directed Daily. At 11 am   Yes Caleb Hunter MD   levothyroxine (SYNTHROID, LEVOTHROID) 50 MCG tablet Take 1 tablet by mouth Daily. 6/1/17  Yes Casandra Pedersen DO   Liraglutide (Victoza) 18 MG/3ML solution pen-injector injection Inject 1.8 mg under the skin into the appropriate area as directed Daily.   Yes Caleb Hunter MD   metoprolol tartrate (LOPRESSOR) 25 MG tablet Take 0.5 tablets by mouth 2 (Two) Times a Day. 8/30/17  Yes Wallace Haro MD   ondansetron (ZOFRAN) 4 MG tablet Take 1 tablet by mouth Every 8 (Eight) Hours As Needed.   Yes Caleb Hunter MD   pantoprazole (PROTONIX) 40 MG EC tablet Take 1 tablet by mouth Daily.  Patient taking differently: Take 1 tablet by mouth 2 (Two) Times a Day. 5/19/17  Yes Casandra Pedersen DO   traZODone (DESYREL) 100 MG tablet Take 1 tablet by mouth Every Night.   Yes Caleb Hunter MD   aspirin 81 MG EC tablet Take 1 tablet by mouth Every Night.    Caleb Hunter MD   warfarin (COUMADIN) 7.5 MG tablet Take 1 tablet by mouth. Takes 1 tab daily except for  Thursday and Saturday when takes 1/2 tab    Caleb Hunter MD     Allergies:  Hydrocodone    I have reviewed the patients medical history, surgical history and family history in the available medical record system.     Current Medications:     Current Facility-Administered Medications   Medication Dose Route Frequency Provider Last Rate Last Admin    cefTRIAXone (ROCEPHIN) 1000 mg/100 mL 0.9% NS (MBP)  1,000 mg Intravenous Once Mihai Kern DO        dextrose 5 % and sodium chloride 0.45 % infusion  30 mL/hr Intravenous Continuous PRN Mihai Kern DO 30 mL/hr at 09/19/23 0941 30 mL/hr at 09/19/23 0941        Objective     Physical Exam:   Temp:  [98.1 °F (36.7 °C)] 98.1 °F (36.7 °C)  Heart Rate:  [85] 85  Resp:  [20] 20  BP: (105)/(83) 105/83    Physical Exam:  General Appearance:    Alert, cooperative, in no acute distress   Head:    Normocephalic, without obvious abnormality, atraumatic   Eyes:            Lids and lashes normal, conjunctivae and sclerae normal, no icterus, no pallor, corneas clear, PERRLA   Ears:    Ears appear intact with no abnormalities noted   Throat:   No oral lesions, no thrush, oral mucosa moist   Neck:   No adenopathy, supple, trachea midline, no thyromegaly, no  carotid bruit, no JVD   Back:     No kyphosis present, no scoliosis present, no skin lesions,   erythema or scars, no tenderness to percussion or                 palpation,  range of motion normal   Lungs:     Clear to auscultation,respirations regular, even and         unlabored    Heart:    Regular rhythm and normal rate, normal S1 and S2, no  murmur, no gallop, no rub, no click   Breast Exam:    Deferred   Abdomen:     Normal bowel sounds, no masses, no organomegaly, soft  nontender, nondistended, no guarding, no rebound                 tenderness   Genitalia:    Deferred   Extremities:   Moves all extremities well, no edema, no cyanosis, no          redness   Pulses:   Pulses palpable and equal bilaterally   Skin:   No bleeding, bruising or rash   Lymph nodes:   No palpable adenopathy   Neurologic:   Cranial nerves 2 - 12 grossly intact, sensation intact, DTR     present and equal bilaterally      Results Review:     Lab Results   Component Value Date    WBC 8.3 02/09/2021    WBC 8.5 10/27/2020    WBC 8.65 02/12/2020    HGB 13.0 02/09/2021    HGB 13.3 10/27/2020    HGB 12.6 02/12/2020    HCT 41.4 02/09/2021    HCT 42.0 10/27/2020    HCT 38.7 02/12/2020    .0 02/09/2021    .0 10/27/2020     02/12/2020             No results found for: LIPASE  Lab Results   Component Value Date    INR 7.87 (C) 07/25/2023     INR 3.20 (A) 05/02/2023    INR 1.07 07/15/2022     No results found for: THROATCX    Radiology Review:  Imaging Results (Last 72 Hours)       ** No results found for the last 72 hours. **             I reviewed the patient's new clinical results.  I reviewed the patient's new imaging results and agree with the interpretation.     ASSESSMENT/PLAN:   ASSESSMENT:  1.  Erickson's esophagus    PLAN:  1.  Esophagogastroduodenoscopy with biopsy    Risk and benefits associated with the procedure are reviewed with the patient.  Patient wished to proceed     Mihai Kern DO  09/19/23  10:05 CDT

## 2023-09-19 NOTE — ANESTHESIA POSTPROCEDURE EVALUATION
Patient: Frances Motta    Procedure Summary       Date: 09/19/23 Room / Location: [facility] ENDOSCOPY 2 / [facility] ENDOSCOPY    Anesthesia Start: 1015 Anesthesia Stop: 1028    Procedure: ESOPHAGOGASTRODUODENOSCOPY 10:00 Diagnosis:       Erickson's esophagus      (Erickson's esophagus [K22.70])    Surgeons: Mihai Kern DO Provider: Denae Bansal CRNA    Anesthesia Type: general ASA Status: 3            Anesthesia Type: general    Vitals  No vitals data found for the desired time range.          Post Anesthesia Care and Evaluation    Patient location during evaluation: PHASE II  Patient participation: complete - patient cannot participate  Level of consciousness: sleepy but conscious  Pain score: 0  Pain management: adequate    Airway patency: patent  Anesthetic complications: No anesthetic complications  PONV Status: none  Cardiovascular status: acceptable  Respiratory status: acceptable  Hydration status: acceptable  No anesthesia care post op

## 2023-09-19 NOTE — ANESTHESIA PREPROCEDURE EVALUATION
Anesthesia Evaluation     Patient summary reviewed and Nursing notes reviewed   history of anesthetic complications:  PONV  NPO Solid Status: > 8 hours  NPO Liquid Status: > 2 hours           Airway   Mallampati: II  TM distance: >3 FB  Neck ROM: full  No difficulty expected  Dental    (+) poor dentition    Pulmonary - normal exam    breath sounds clear to auscultation  (+) asthma,sleep apnea  Cardiovascular - normal exam    PT is on anticoagulation therapy    (+) dysrhythmias Atrial Fib, CHF , hyperlipidemia    ROS comment: Echo 7/11/2022   1. Left ventricular ejection fraction is normal, with an ejection fraction   of 55-60%.     2. The left ventricular diastolic function is grade I diastolic dysfunction.     3. Left atrial chamber dimension is mildly enlarged.     4. There is trace aortic valve regurgitation.     5. There is mild tricuspid valve regurgitation.     6. No pulmonary hypertension, estimated pulmonary arterial systolic pressure   is 32 mmHg.     7. Normal inferior vena cava with >50% collapse upon inspiration consistent   with normal right atrial pressure, 5 mmHg.     8. There is no pericardial effusion.     Hypertrophic obstructive cardiomyopathy       Neuro/Psych  (+) TIA, headaches  GI/Hepatic/Renal/Endo    (+) GERD, diabetes mellitus, thyroid problem hypothyroidism    Musculoskeletal     Abdominal   (+) obese   Substance History      OB/GYN          Other                        Anesthesia Plan    ASA 3     general   total IV anesthesia  intravenous induction     Anesthetic plan, risks, benefits, and alternatives have been provided, discussed and informed consent has been obtained with: patient.      CODE STATUS:

## 2023-09-20 LAB — REF LAB TEST METHOD: NORMAL

## 2025-03-17 NOTE — TELEPHONE ENCOUNTER
----- Message from Rowan Bond sent at 10/23/2018  9:43 AM CDT -----  Need refill on iron pills   Patient is kept comfortable during post-procedure stay. VSS. Denies pain. Right femoral access site remains dry & free from signs of bleeding. Tolerated food and fluids. Ambulated without issues. Appointments made & included in AVS. Dr. Forrest was able to speak with patient post procedure. Post-op instructions reviewed and packet given to patient & spouse. Able to ask questions. Verbalized no concerns. Belongings returned. Discharged in stable condition.

## (undated) DEVICE — APPL CHLORAPREP W/TINT 26ML ORNG

## (undated) DEVICE — SUT VIC 3/0 TIES 18IN J110T

## (undated) DEVICE — 3M™ STERI-STRIP™ REINFORCED ADHESIVE SKIN CLOSURES, R1547, 1/2 IN X 4 IN (12 MM X 100 MM), 6 STRIPS/ENVELOPE: Brand: 3M™ STERI-STRIP™

## (undated) DEVICE — ADHS LIQ MASTISOL 2/3ML

## (undated) DEVICE — SUT SILK B SUTUPK 2/0 18IN SA65H

## (undated) DEVICE — SUT VIC 2/0 CT1 36IN

## (undated) DEVICE — TOTAL TRAY, 16FR 10ML SIL FOLEY, URN: Brand: MEDLINE

## (undated) DEVICE — ANTIBACTERIAL UNDYED BRAIDED (POLYGLACTIN 910), SYNTHETIC ABSORBABLE SUTURE: Brand: COATED VICRYL

## (undated) DEVICE — CANN SMPL SOFTECH BIFLO ETCO2 A/M 7FT

## (undated) DEVICE — SUT PDS 2 0 CT1 27IN CLR Z259H

## (undated) DEVICE — SUT VICRYL 0 TIES J112T

## (undated) DEVICE — SINGLE-USE BIOPSY FORCEPS: Brand: RADIAL JAW 4

## (undated) DEVICE — SUT PROLN 0 CT1 30IN 8424H

## (undated) DEVICE — SHEET,DRAPE,53X77,STERILE: Brand: MEDLINE

## (undated) DEVICE — STERILE POLYISOPRENE POWDER-FREE SURGICAL GLOVES WITH EMOLLIENT COATING: Brand: PROTEXIS

## (undated) DEVICE — GLV SURG TRIUMPH LT PF LTX 6.5 STRL

## (undated) DEVICE — PK MAJ PROC LF 60

## (undated) DEVICE — UNDYED BRAIDED (POLYGLACTIN 910), SYNTHETIC ABSORBABLE SUTURE: Brand: COATED VICRYL

## (undated) DEVICE — SOL IRR NACL 0.9PCT BT 1000ML

## (undated) DEVICE — SUT PDS CLOSURE CT1 1/0 27IN Z341H

## (undated) DEVICE — SPNG LAP 18X18IN LF STRL PK/5

## (undated) DEVICE — GLV SURG SENSICARE PI PF LF 7 GRN STRL

## (undated) DEVICE — GLV SURG SENSICARE PI LF PF 8 GRN STRL

## (undated) DEVICE — SUT NLY 2/0 664G

## (undated) DEVICE — DRN WND JP RND W TROC SIL 19F 1/4IN

## (undated) DEVICE — RESERVOIR,SUCTION,100CC,SILICONE: Brand: MEDLINE

## (undated) DEVICE — SUT VIC 2/0 TIES 18IN J111T

## (undated) DEVICE — BITEBLOCK ENDO W/STRAP 60F A/ LF DISP

## (undated) DEVICE — GLV SURG TRIUMPH LT PF LTX 7.5 STRL

## (undated) DEVICE — SUT VIC 3/0 SH 27IN J416H